# Patient Record
Sex: FEMALE | Race: WHITE | NOT HISPANIC OR LATINO | Employment: OTHER | URBAN - METROPOLITAN AREA
[De-identification: names, ages, dates, MRNs, and addresses within clinical notes are randomized per-mention and may not be internally consistent; named-entity substitution may affect disease eponyms.]

---

## 2017-11-09 ENCOUNTER — OFFICE VISIT (OUTPATIENT)
Dept: URGENT CARE | Facility: CLINIC | Age: 57
End: 2017-11-09
Payer: MEDICAID

## 2017-11-09 VITALS
SYSTOLIC BLOOD PRESSURE: 132 MMHG | WEIGHT: 168 LBS | DIASTOLIC BLOOD PRESSURE: 84 MMHG | OXYGEN SATURATION: 97 % | BODY MASS INDEX: 27 KG/M2 | TEMPERATURE: 98 F | HEART RATE: 73 BPM | HEIGHT: 66 IN

## 2017-11-09 DIAGNOSIS — M54.41 RIGHT-SIDED LOW BACK PAIN WITH RIGHT-SIDED SCIATICA, UNSPECIFIED CHRONICITY: Primary | ICD-10-CM

## 2017-11-09 PROCEDURE — 99211 OFF/OP EST MAY X REQ PHY/QHP: CPT | Mod: 25,S$GLB,, | Performed by: EMERGENCY MEDICINE

## 2017-11-09 PROCEDURE — 99203 OFFICE O/P NEW LOW 30 MIN: CPT | Mod: 25,S$GLB,, | Performed by: PHYSICIAN ASSISTANT

## 2017-11-09 RX ORDER — DEXAMETHASONE SODIUM PHOSPHATE 100 MG/10ML
8 INJECTION INTRAMUSCULAR; INTRAVENOUS
Status: COMPLETED | OUTPATIENT
Start: 2017-11-09 | End: 2017-11-09

## 2017-11-09 RX ADMIN — DEXAMETHASONE SODIUM PHOSPHATE 8 MG: 100 INJECTION INTRAMUSCULAR; INTRAVENOUS at 02:11

## 2017-11-09 NOTE — PATIENT INSTRUCTIONS
- Rest.    - Drink plenty of fluids.    - Tylenol or Ibuprofen as directed as needed for fever/pain.    - Warm compresses to the affected area for 20 minutes at a time.   - Follow up with your PCP or specialty clinic as directed in the next 1-2 weeks if not improved or as needed.  You can call [phone] to schedule an appointment with the appropriate provider.    - Go to the ED if your symptoms worsen.    Back Pain (Acute or Chronic)    Back pain is one of the most common problems. The good news is that most people feel better in 1 to 2 weeks, and most of the rest in 1 to 2 months. Most people can remain active.  People experience and describe pain differently; not everyone is the same.  · The pain can be sharp, stabbing, shooting, aching, cramping or burning.  · Movement, standing, bending, lifting, sitting, or walking may worsen pain.  · It can be localized to one spot or area, or it can be more generalized.  · It can spread or radiate upwards, to the front, or go down your arms or legs (sciatica).  · It can cause muscle spasm.  Most of the time, mechanical problems with the muscles or spine cause the pain. Mechanical problems are usually caused by an injury to the muscles or ligaments. While illness can cause back pain, it is usually not caused by a serious illness. Mechanical problems include:   · Physical activity such as sports, exercise, work, or normal activity  · Overexertion, lifting, pushing, pulling incorrectly or too aggressively  · Sudden twisting, bending, or stretching from an accident, or accidental movement  · Poor posture  · Stretching or moving wrong, without noticing pain at the time  · Poor coordination, lack of regular exercise (check with your doctor about this)  · Spinal disc disease or arthritis  · Stress  Pain can also be related to pregnancy, or illness like appendicitis, bladder or kidney infections, pelvic infections, and many other things.  Acute back pain usually gets better in 1  to 2 weeks. Back pain related to disk disease, arthritis in the spinal joints or spinal stenosis (narrowing of the spinal canal) can become chronic and last for months or years.  Unless you had a physical injury (for example, a car accident or fall) X-rays are usually not needed for the initial evaluation of back pain. If pain continues and does not respond to medical treatment, X-rays and other tests may be needed.  Home care  Try these home care recommendations:  · When in bed, try to find a position of comfort. A firm mattress is best. Try lying flat on your back with pillows under your knees. You can also try lying on your side with your knees bent up towards your chest and a pillow between your knees.  · At first, do not try to stretch out the sore spots. If there is a strain, it is not like the good soreness you get after exercising without an injury. In this case, stretching may make it worse.  · Avoid prolong sitting, long car rides, or travel. This puts more stress on the lower back than standing or walking.  · During the first 24 to 72 hours after an acute injury or flare up of chronic back pain, apply an ice pack to the painful area for 20 minutes and then remove it for 20 minutes. Do this over a period of 60 to 90 minutes or several times a day. This will reduce swelling and pain. Wrap the ice pack in a thin towel or plastic to protect your skin.  · You can start with ice, then switch to heat. Heat (hot shower, hot bath, or heating pad) reduces pain and works well for muscle spasms. Heat can be applied to the painful area for 20 minutes then remove it for 20 minutes. Do this over a period of 60 to 90 minutes or several times a day. Do not sleep on a heating pad. It can lead to skin burns or tissue damage.  · You can alternate ice and heat therapy. Talk with your doctor about the best treatment for your back pain.  · Therapeutic massage can help relax the back muscles without stretching them.  · Be aware  of safe lifting methods and do not lift anything without stretching first.  Medicines  Talk to your doctor before using medicine, especially if you have other medical problems or are taking other medicines.  · You may use over-the-counter medicine as directed on the bottle to control pain, unless another pain medicine was prescribed. If you have chronic conditions like diabetes, liver or kidney disease, stomach ulcers, or gastrointestinal bleeding, or are taking blood thinners, talk to your doctor before taking any medicine.  · Be careful if you are given a prescription medicines, narcotics, or medicine for muscle spasms. They can cause drowsiness, affect your coordination, reflexes, and judgement. Do not drive or operate heavy machinery.  Follow-up care  Follow up with your healthcare provider, or as advised.   A radiologist will review any X-rays that were taken. Your provide will notify you of any new findings that may affect your care.  Call 911  Call emergency services if any of the following occur:  · Trouble breathing  · Confusion  · Very drowsy or trouble awakening  · Fainting or loss of consciousness  · Rapid or very slow heart rate  · Loss of bowel or bladder control  When to seek medical advice  Call your healthcare provider right away if any of these occur:   · Pain becomes worse or spreads to your legs  · Weakness or numbness in one or both legs  · Numbness in the groin or genital area  Date Last Reviewed: 7/1/2016  © 5765-9912 [employer]. [address] [place] [id number]. All rights reserved. This information is not intended as a substitute for professional medical care. Always follow your healthcare professional's instructions.        Sciatica    Sciatica is a condition that causes pain in the lower back that spreads down into the buttock, hip, and leg. Sometimes the leg pain can happen without any back pain. Sciatica happens when a spinal nerve is irritated or has pressure put  on it as comes out of the spinal canal in the lower back. This most often happens when a bulge or rupture of a nearby spinal disk presses on the nerve. Sciatica can also be caused by a narrowing of the spinal canal (spinal stenosis) or spasm of the muscle in the buttocks that the sciatic nerve passes through (pyriform muscle). Sciatica is also called lumbar radiculopathy.  Sciatica may begin after a sudden twisting or bending force, such as in a car accident. Or it can happen after a simple awkward movement. In either case, muscle spasm often also happens. Muscle spasm makes the pain worse.  A healthcare provider makes a diagnosis of sciatica from your symptoms and a physical exam. Unless you had an injury from a car accident or fall, you usually wont have X-rays taken at this time. This is because the nerves and disks in your back cant be seen on an X-ray. If the provider sees signs of a compressed nerve, you will need to schedule an MRI scan as an outpatient. Signs of a compressed nerve include loss of strength in a leg.  Most sciatica gets better with medicine, exercise, and physical therapy. If your symptoms continue after at least 3 months of medical treatment, you may need surgery or injections to your lower back.  Home care  Follow these tips when caring for yourself at home:  · You may need to stay in bed the first few days. But as soon as possible, begin sitting up or walking. This will help you avoid problems that come from staying in bed for long periods.  · When in bed, try to find a position that is comfortable. A firm mattress is best. Try lying flat on your back with pillows under your knees. You can also try lying on your side with your knees bent up toward your chest and a pillow between your knees.  · Avoid sitting for long periods. This puts more stress on your lower back than standing or walking.  · Use heat from a hot shower, hot bath, or heating pad to help ease pain. Massage can also help.  You can also try using an ice pack. You can make your own ice pack by putting ice cubes in a plastic bag. Wrap the bag in a thin towel. Try both heat and cold to see which works best. Use the method that feels best for 20 minutes several times a day.  · You may use acetaminophen or ibuprofen to ease pain, unless another pain medicine was prescribed. Note: If you have chronic liver or kidney disease, talk with your healthcare provider before taking these medicines. Also talk with your provider if youve had a stomach ulcer or gastrointestinal bleeding.  · Use safe lifting methods. Dont lift anything heavier than 15 pounds until all of the pain is gone.  Follow-up care  Follow up with your healthcare provider, or as advised. You may need physical therapy or additional tests.  If X-rays were taken, a radiologist will look at them. You will be told of any new findings that may affect your care.  When to seek medical advice  Call your healthcare provider right away if any of these occur:  · Pain gets worse even after taking prescribed medicine  · Weakness or numbness in 1 or both legs or hips  · Numbness in your groin or genital area  · You cant control your bowel or bladder  · Fever  · Redness or swelling over your back or spine   Date Last Reviewed: 8/1/2016  © 3924-2377 [employer]. [address] [place] [address], [place] [address]. All rights reserved. This information is not intended as a substitute for professional medical care. Always follow your healthcare professional's instructions.

## 2017-11-09 NOTE — PROGRESS NOTES
"Subjective:       Patient ID: Taty Kenney is a 57 y.o. female.    Vitals:  height is 5' 6" (1.676 m) and weight is 76.2 kg (168 lb). Her tympanic temperature is 97.8 °F (36.6 °C). Her blood pressure is 132/84 and her pulse is 73. Her oxygen saturation is 97%.     Chief Complaint: Back Pain    This is a 57 y.o. female with History reviewed. No pertinent past medical history.   who presents today with a chief complaint of Back Pain.      Back Pain   This is a new problem. The current episode started in the past 7 days. The problem occurs constantly. The problem has been gradually worsening since onset. The quality of the pain is described as shooting. The pain radiates to the left foot. The pain is at a severity of 9/10. The pain is severe. The pain is the same all the time. The symptoms are aggravated by bending, sitting and twisting. Stiffness is present all day. Pertinent negatives include no abdominal pain, bladder incontinence, bowel incontinence, dysuria or numbness. She has tried muscle relaxant for the symptoms. The treatment provided moderate relief.     Review of Systems   Constitution: Negative for malaise/fatigue.   Skin: Negative for rash.   Musculoskeletal: Positive for back pain. Negative for muscle cramps, muscle weakness and stiffness.   Gastrointestinal: Negative for abdominal pain and bowel incontinence.   Genitourinary: Negative for bladder incontinence, dysuria, hematuria and urgency.   Neurological: Negative for disturbances in coordination and numbness.       Objective:      Physical Exam   Constitutional: She is oriented to person, place, and time. She appears well-developed and well-nourished.   HENT:   Head: Normocephalic and atraumatic.   Eyes: Conjunctivae are normal.   Neck: Normal range of motion. Neck supple. No thyromegaly present.   Cardiovascular: Normal rate and regular rhythm.  Exam reveals no gallop and no friction rub.    No murmur heard.  Pulmonary/Chest: Effort normal " and breath sounds normal. She has no wheezes. She has no rales.   Musculoskeletal:        Lumbar back: She exhibits decreased range of motion and tenderness (right SI joint). She exhibits no bony tenderness.   Lymphadenopathy:     She has no cervical adenopathy.   Neurological: She is alert and oriented to person, place, and time.   Skin: Skin is warm and dry. No rash noted. No erythema.   Psychiatric: She has a normal mood and affect. Her behavior is normal. Judgment and thought content normal.   Nursing note and vitals reviewed.      Assessment:       1. Right-sided low back pain with right-sided sciatica, unspecified chronicity        Plan:         Right-sided low back pain with right-sided sciatica, unspecified chronicity  -     dexamethasone injection 8 mg; Inject 0.8 mLs (8 mg total) into the muscle one time.      Taty was seen today for back pain.    Diagnoses and all orders for this visit:    Right-sided low back pain with right-sided sciatica, unspecified chronicity  -     dexamethasone injection 8 mg; Inject 0.8 mLs (8 mg total) into the muscle one time.      Patient Instructions   - Rest.    - Drink plenty of fluids.    - Tylenol or Ibuprofen as directed as needed for fever/pain.    - Warm compresses to the affected area for 20 minutes at a time.   - Follow up with your PCP or specialty clinic as directed in the next 1-2 weeks if not improved or as needed.  You can call [phone] to schedule an appointment with the appropriate provider.    - Go to the ED if your symptoms worsen.    Back Pain (Acute or Chronic)    Back pain is one of the most common problems. The good news is that most people feel better in 1 to 2 weeks, and most of the rest in 1 to 2 months. Most people can remain active.  People experience and describe pain differently; not everyone is the same.  · The pain can be sharp, stabbing, shooting, aching, cramping or burning.  · Movement, standing, bending, lifting, sitting, or walking  may worsen pain.  · It can be localized to one spot or area, or it can be more generalized.  · It can spread or radiate upwards, to the front, or go down your arms or legs (sciatica).  · It can cause muscle spasm.  Most of the time, mechanical problems with the muscles or spine cause the pain. Mechanical problems are usually caused by an injury to the muscles or ligaments. While illness can cause back pain, it is usually not caused by a serious illness. Mechanical problems include:   · Physical activity such as sports, exercise, work, or normal activity  · Overexertion, lifting, pushing, pulling incorrectly or too aggressively  · Sudden twisting, bending, or stretching from an accident, or accidental movement  · Poor posture  · Stretching or moving wrong, without noticing pain at the time  · Poor coordination, lack of regular exercise (check with your doctor about this)  · Spinal disc disease or arthritis  · Stress  Pain can also be related to pregnancy, or illness like appendicitis, bladder or kidney infections, pelvic infections, and many other things.  Acute back pain usually gets better in 1 to 2 weeks. Back pain related to disk disease, arthritis in the spinal joints or spinal stenosis (narrowing of the spinal canal) can become chronic and last for months or years.  Unless you had a physical injury (for example, a car accident or fall) X-rays are usually not needed for the initial evaluation of back pain. If pain continues and does not respond to medical treatment, X-rays and other tests may be needed.  Home care  Try these home care recommendations:  · When in bed, try to find a position of comfort. A firm mattress is best. Try lying flat on your back with pillows under your knees. You can also try lying on your side with your knees bent up towards your chest and a pillow between your knees.  · At first, do not try to stretch out the sore spots. If there is a strain, it is not like the good soreness you get  after exercising without an injury. In this case, stretching may make it worse.  · Avoid prolong sitting, long car rides, or travel. This puts more stress on the lower back than standing or walking.  · During the first 24 to 72 hours after an acute injury or flare up of chronic back pain, apply an ice pack to the painful area for 20 minutes and then remove it for 20 minutes. Do this over a period of 60 to 90 minutes or several times a day. This will reduce swelling and pain. Wrap the ice pack in a thin towel or plastic to protect your skin.  · You can start with ice, then switch to heat. Heat (hot shower, hot bath, or heating pad) reduces pain and works well for muscle spasms. Heat can be applied to the painful area for 20 minutes then remove it for 20 minutes. Do this over a period of 60 to 90 minutes or several times a day. Do not sleep on a heating pad. It can lead to skin burns or tissue damage.  · You can alternate ice and heat therapy. Talk with your doctor about the best treatment for your back pain.  · Therapeutic massage can help relax the back muscles without stretching them.  · Be aware of safe lifting methods and do not lift anything without stretching first.  Medicines  Talk to your doctor before using medicine, especially if you have other medical problems or are taking other medicines.  · You may use over-the-counter medicine as directed on the bottle to control pain, unless another pain medicine was prescribed. If you have chronic conditions like diabetes, liver or kidney disease, stomach ulcers, or gastrointestinal bleeding, or are taking blood thinners, talk to your doctor before taking any medicine.  · Be careful if you are given a prescription medicines, narcotics, or medicine for muscle spasms. They can cause drowsiness, affect your coordination, reflexes, and judgement. Do not drive or operate heavy machinery.  Follow-up care  Follow up with your healthcare provider, or as advised.   A  radiologist will review any X-rays that were taken. Your provide will notify you of any new findings that may affect your care.  Call 911  Call emergency services if any of the following occur:  · Trouble breathing  · Confusion  · Very drowsy or trouble awakening  · Fainting or loss of consciousness  · Rapid or very slow heart rate  · Loss of bowel or bladder control  When to seek medical advice  Call your healthcare provider right away if any of these occur:   · Pain becomes worse or spreads to your legs  · Weakness or numbness in one or both legs  · Numbness in the groin or genital area  Date Last Reviewed: 7/1/2016 © 2000-2017 [employer]. [address] [place] [address], [place] [address]. All rights reserved. This information is not intended as a substitute for professional medical care. Always follow your healthcare professional's instructions.        Sciatica    Sciatica is a condition that causes pain in the lower back that spreads down into the buttock, hip, and leg. Sometimes the leg pain can happen without any back pain. Sciatica happens when a spinal nerve is irritated or has pressure put on it as comes out of the spinal canal in the lower back. This most often happens when a bulge or rupture of a nearby spinal disk presses on the nerve. Sciatica can also be caused by a narrowing of the spinal canal (spinal stenosis) or spasm of the muscle in the buttocks that the sciatic nerve passes through (pyriform muscle). Sciatica is also called lumbar radiculopathy.  Sciatica may begin after a sudden twisting or bending force, such as in a car accident. Or it can happen after a simple awkward movement. In either case, muscle spasm often also happens. Muscle spasm makes the pain worse.  A healthcare provider makes a diagnosis of sciatica from your symptoms and a physical exam. Unless you had an injury from a car accident or fall, you usually wont have X-rays taken at this time. This is because the nerves and  disks in your back cant be seen on an X-ray. If the provider sees signs of a compressed nerve, you will need to schedule an MRI scan as an outpatient. Signs of a compressed nerve include loss of strength in a leg.  Most sciatica gets better with medicine, exercise, and physical therapy. If your symptoms continue after at least 3 months of medical treatment, you may need surgery or injections to your lower back.  Home care  Follow these tips when caring for yourself at home:  · You may need to stay in bed the first few days. But as soon as possible, begin sitting up or walking. This will help you avoid problems that come from staying in bed for long periods.  · When in bed, try to find a position that is comfortable. A firm mattress is best. Try lying flat on your back with pillows under your knees. You can also try lying on your side with your knees bent up toward your chest and a pillow between your knees.  · Avoid sitting for long periods. This puts more stress on your lower back than standing or walking.  · Use heat from a hot shower, hot bath, or heating pad to help ease pain. Massage can also help. You can also try using an ice pack. You can make your own ice pack by putting ice cubes in a plastic bag. Wrap the bag in a thin towel. Try both heat and cold to see which works best. Use the method that feels best for 20 minutes several times a day.  · You may use acetaminophen or ibuprofen to ease pain, unless another pain medicine was prescribed. Note: If you have chronic liver or kidney disease, talk with your healthcare provider before taking these medicines. Also talk with your provider if youve had a stomach ulcer or gastrointestinal bleeding.  · Use safe lifting methods. Dont lift anything heavier than 15 pounds until all of the pain is gone.  Follow-up care  Follow up with your healthcare provider, or as advised. You may need physical therapy or additional tests.  If X-rays were taken, a radiologist will  look at them. You will be told of any new findings that may affect your care.  When to seek medical advice  Call your healthcare provider right away if any of these occur:  · Pain gets worse even after taking prescribed medicine  · Weakness or numbness in 1 or both legs or hips  · Numbness in your groin or genital area  · You cant control your bowel or bladder  · Fever  · Redness or swelling over your back or spine   Date Last Reviewed: 8/1/2016 © 2000-2017 [employer]. [address] [place] [address], [place] [address]. All rights reserved. This information is not intended as a substitute for professional medical care. Always follow your healthcare professional's instructions.

## 2018-01-05 ENCOUNTER — OFFICE VISIT (OUTPATIENT)
Dept: URGENT CARE | Facility: CLINIC | Age: 58
End: 2018-01-05
Payer: MEDICAID

## 2018-01-05 VITALS
OXYGEN SATURATION: 97 % | RESPIRATION RATE: 18 BRPM | SYSTOLIC BLOOD PRESSURE: 164 MMHG | TEMPERATURE: 98 F | DIASTOLIC BLOOD PRESSURE: 102 MMHG | BODY MASS INDEX: 26.68 KG/M2 | HEART RATE: 87 BPM | WEIGHT: 170 LBS | HEIGHT: 67 IN

## 2018-01-05 DIAGNOSIS — B02.9 HERPES ZOSTER WITHOUT COMPLICATION: Primary | ICD-10-CM

## 2018-01-05 PROCEDURE — 99214 OFFICE O/P EST MOD 30 MIN: CPT | Mod: S$GLB,,, | Performed by: NURSE PRACTITIONER

## 2018-01-05 RX ORDER — PAROXETINE HYDROCHLORIDE 40 MG/1
40 TABLET, FILM COATED ORAL EVERY MORNING
COMMUNITY
End: 2020-07-06

## 2018-01-05 RX ORDER — VALACYCLOVIR HYDROCHLORIDE 1 G/1
1000 TABLET, FILM COATED ORAL 3 TIMES DAILY
Qty: 21 TABLET | Refills: 0 | Status: SHIPPED | OUTPATIENT
Start: 2018-01-05 | End: 2020-07-06 | Stop reason: ALTCHOICE

## 2018-01-05 NOTE — PROGRESS NOTES
"Subjective:       Patient ID: Taty Kenney is a 57 y.o. female.    Vitals:  height is 5' 7" (1.702 m) and weight is 77.1 kg (170 lb). Her oral temperature is 98.2 °F (36.8 °C). Her blood pressure is 164/102 (abnormal) and her pulse is 87. Her respiration is 18 and oxygen saturation is 97%.     Chief Complaint: Rash    4 days of developing rash to both eye orbits and eyelids. She endorses moderate pain, but no pruritus. She denies occular pain or discharge or visual changes. She has tried antihistamine with no success.  She denies recent trauma, change in soap or detergent.      Rash   This is a new problem. The current episode started in the past 7 days. The problem has been gradually worsening since onset. The affected locations include the face. The rash is characterized by itchiness and redness. Pertinent negatives include no fever, joint pain, shortness of breath or sore throat. Past treatments include antihistamine. The treatment provided no relief.     Review of Systems   Constitution: Negative for chills and fever.   HENT: Negative for sore throat.    Respiratory: Negative for shortness of breath.    Skin: Positive for rash. Negative for itching.   Musculoskeletal: Negative for joint pain.       Objective:      Physical Exam   Constitutional: She is oriented to person, place, and time. She appears well-developed and well-nourished.   HENT:   Head: Normocephalic and atraumatic.       Right Ear: External ear normal.   Left Ear: External ear normal.   Nose: Nose normal.   Eyes: EOM and lids are normal. Pupils are equal, round, and reactive to light. Right conjunctiva is injected. Left conjunctiva is injected.   Chronic dry eyes and injection   Neck: Trachea normal, full passive range of motion without pain and phonation normal. Neck supple.   Musculoskeletal: Normal range of motion.   Neurological: She is alert and oriented to person, place, and time.   Skin: Skin is warm, dry and intact.   Psychiatric: " She has a normal mood and affect. Her speech is normal and behavior is normal. Judgment and thought content normal. Cognition and memory are normal.   Nursing note and vitals reviewed.      Assessment:       1. Herpes zoster without complication        Plan:         Herpes zoster without complication  -     Ambulatory referral to Ophthalmology  -     valACYclovir (VALTREX) 1000 MG tablet; Take 1 tablet (1,000 mg total) by mouth 3 (three) times daily.  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  She prefers to see him for this issue, but an urgent [facility] order was placed in case there is an issue with being seen today.    Patient states appt made for this afternoon with Dr Wright.    It was stressed to patient the importance of being seen for this issue urgently and she acknowledged complete understanding of repercussions of delaying treatment.    Patient Instructions         Shingles (Herpes Zoster)     Talk to your healthcare provider about the shingles vaccine.     Shingles is also called herpes zoster. It is a painful skin rash caused by the herpes zoster virus. This is the same virus that causes chickenpox. After a person has chickenpox, the virus remains inactive in the nerve cells. Years later, the virus can become active again and travel to the skin. Most people have shingles only once, but it is possible to have it more than once.  What are the risk factors for shingles?  Anyone who has ever had chickenpox can develop shingles. But your risk is greater if you:  · Are 50 years of age or older  · Have an illness that weakens your immune system, such as HIV/AIDS  · Have cancer, especially Hodgkin disease or lymphoma  · Take medicines that weaken your immune system  What are the symptoms of shingles?  · The first sign of shingles is usually pain, burning, tingling, or itching on one part of your face or body. You may also feel as if you have the flu, with  fever and chills.  · A red rash with small blisters appears within a few days. The rash may appear as follows:   ¨ The blisters can occur anywhere, but theyre most common on the back, chest, or abdomen.  ¨ They usually appear on only one side of the body, spreading along the nerve pathway where the virus was inactive.   ¨ The rash can also form around an eye, along one side of the face or neck, or in the mouth.  ¨ In a few people, usually those with weakened immune systems, shingles appear on more than one part of the body at once.  · After a few days, the blisters become dry and form a crust. The crust falls off in days to weeks. The blisters generally do not leave scars.  How is shingles treated?  For most people, shingles heals on its own in a few weeks. But treatment is recommended to help relieve pain, speed healing, and reduce the risk of complications. Antiviral medicines are prescribed within the first 72 hours of the appearance of the rash. To lessen symptoms:  · Apply ice packs (wrapped in a thin towel) or cool compresses, or soak in a cool bath.  · Use calamine lotion to calm itchy skin.  · Ask your healthcare provider about over-the-counter pain relievers. If your pain is severe, your healthcare provider may prescribe stronger pain medicines.  What are the complications of shingles?  Shingles often goes away with no lasting effects. But some people have serious problems long after the blisters have healed:  · Postherpetic neuralgia. This is the most common complication. It is severe nerve pain at the place where the rash used to be. It can last for months, or even years after you have had shingles. Medicines can be prescribed to help relieve the pain and improve quality of life.  · Bacterial infection. Shingles blisters may become infected with bacteria. Antibiotic medicine is used to treat the infection.  · Eye problems. A person with shingles on the face should see his or her healthcare provider right  away. Shingles can cause serious problems with vision, and even blindness.  Very rarely shingles can also lead to pneumonia, hearing problems, brain inflammation, or even death.   When to seek medical care  Contact your healthcare provider if you experience any of the following:  · Symptoms that dont go away with treatment  · A rash or blisters near your eye  · Increased drainage, fever, or rash after treatment, or severe pain that doesnt go away   How can shingles be prevented?  You can only get shingles if you have had chicken pox in the past. Those who have never had chickenpox can get the virus from you. Although instead of developing shingles, the person may get chickenpox. Until your blisters form scabs, avoid contact with others, especially the following:  · Pregnant women who have never had chickenpox or the vaccine  · Infants who were born early (prematurely) or who had low weight at birth  · People with weak immune system (for example, people receiving chemotherapy for cancer, people who have had organ transplants, or people with HIV infections)     The shingles vaccine  If youre 60 years of age or older, ask your healthcare provider if you should receive the shingles vaccine. The vaccine makes it less likely that you will develop shingles. If you do develop shingles, your symptoms will likely be milder than if you hadnt been vaccinated. Note: Although the vaccine is licensed for people 50 years of age or older, the CDC does not recommend the vaccine for those who are 50 to 59 years old.   Date Last Reviewed: 10/1/2016  © 3848-1024 [employer]. [address] [place] [address], [place] [address]. All rights reserved. This information is not intended as a substitute for professional medical care. Always follow your healthcare professional's instructions.

## 2018-01-05 NOTE — PATIENT INSTRUCTIONS

## 2018-01-24 ENCOUNTER — TELEPHONE (OUTPATIENT)
Dept: OPTOMETRY | Facility: CLINIC | Age: 58
End: 2018-01-24

## 2018-07-17 ENCOUNTER — TELEPHONE (OUTPATIENT)
Dept: SURGERY | Facility: CLINIC | Age: 58
End: 2018-07-17

## 2018-07-17 NOTE — TELEPHONE ENCOUNTER
Called patient to discuss referral we received from Dr. Barrios's office. Left voicemail with my callback number requesting return call at earliest convenience

## 2018-07-18 ENCOUNTER — TELEPHONE (OUTPATIENT)
Dept: SURGERY | Facility: CLINIC | Age: 58
End: 2018-07-18

## 2018-07-18 NOTE — TELEPHONE ENCOUNTER
Patient returned my call, we discussed her recent abnormal mammogram and need for biopsy at [facility]. Patient and I went over our abnormal mammogram protocol, and that we will need to obtain her images on disc prior to setting her up for biopsy. Patient is unable to electronically sign a SERINA, nor can she come by this week to sign it. She would prefer I mail her the SERINA with return postage so she can sign it and mail it back to me. Confirmed address in chart is correct, will overnight release to her with return postage. Patient verbalized understanding to all information

## 2018-07-27 ENCOUNTER — TELEPHONE (OUTPATIENT)
Dept: SURGERY | Facility: CLINIC | Age: 58
End: 2018-07-27

## 2018-07-27 NOTE — TELEPHONE ENCOUNTER
Called patient to let her know I received her SERINA in the mail today, and will request her images from [facility]. No answer, unable to leave voicemail

## 2018-07-31 ENCOUNTER — TELEPHONE (OUTPATIENT)
Dept: SURGERY | Facility: CLINIC | Age: 58
End: 2018-07-31

## 2018-07-31 ENCOUNTER — HOSPITAL ENCOUNTER (OUTPATIENT)
Dept: RADIOLOGY | Facility: HOSPITAL | Age: 58
Discharge: HOME | End: 2018-07-31
Attending: OBSTETRICS & GYNECOLOGY

## 2018-07-31 DIAGNOSIS — R92.8 ABNORMAL MAMMOGRAM: ICD-10-CM

## 2018-07-31 NOTE — TELEPHONE ENCOUNTER
Called patient to let her know I got her images from [facility] today. Patient and I reviewed that images will be interpreted and then she will receive a call regarding the recommendation. Verbalized understanding

## 2018-08-31 ENCOUNTER — HOSPITAL ENCOUNTER (EMERGENCY)
Facility: HOSPITAL | Age: 58
Discharge: HOME | End: 2018-09-01
Attending: EMERGENCY MEDICINE
Payer: MEDICAID

## 2018-08-31 VITALS
HEIGHT: 67 IN | DIASTOLIC BLOOD PRESSURE: 79 MMHG | TEMPERATURE: 98 F | RESPIRATION RATE: 18 BRPM | OXYGEN SATURATION: 99 % | SYSTOLIC BLOOD PRESSURE: 114 MMHG | BODY MASS INDEX: 26.68 KG/M2 | WEIGHT: 170 LBS | HEART RATE: 77 BPM

## 2018-08-31 DIAGNOSIS — R06.02 SOB (SHORTNESS OF BREATH): ICD-10-CM

## 2018-08-31 DIAGNOSIS — F10.920 ALCOHOLIC INTOXICATION WITHOUT COMPLICATION: Primary | ICD-10-CM

## 2018-08-31 LAB
ALBUMIN SERPL BCP-MCNC: 3.9 G/DL
ALP SERPL-CCNC: 100 U/L
ALT SERPL W/O P-5'-P-CCNC: 57 U/L
ANION GAP SERPL CALC-SCNC: 13 MMOL/L
APAP SERPL-MCNC: <3 UG/ML
AST SERPL-CCNC: 52 U/L
BASOPHILS # BLD AUTO: 0.03 K/UL
BASOPHILS NFR BLD: 0.8 %
BILIRUB SERPL-MCNC: 0.3 MG/DL
BUN SERPL-MCNC: 32 MG/DL
CALCIUM SERPL-MCNC: 9 MG/DL
CHLORIDE SERPL-SCNC: 102 MMOL/L
CO2 SERPL-SCNC: 27 MMOL/L
CREAT SERPL-MCNC: 1 MG/DL
DIFFERENTIAL METHOD: ABNORMAL
EOSINOPHIL # BLD AUTO: 0 K/UL
EOSINOPHIL NFR BLD: 0.8 %
ERYTHROCYTE [DISTWIDTH] IN BLOOD BY AUTOMATED COUNT: 15 %
EST. GFR  (AFRICAN AMERICAN): >60 ML/MIN/1.73 M^2
EST. GFR  (NON AFRICAN AMERICAN): >60 ML/MIN/1.73 M^2
ETHANOL SERPL-MCNC: 224 MG/DL
GLUCOSE SERPL-MCNC: 109 MG/DL
HCT VFR BLD AUTO: 33.2 %
HGB BLD-MCNC: 10.7 G/DL
IMM GRANULOCYTES # BLD AUTO: 0.01 K/UL
IMM GRANULOCYTES NFR BLD AUTO: 0.3 %
LYMPHOCYTES # BLD AUTO: 2.1 K/UL
LYMPHOCYTES NFR BLD: 58.6 %
MCH RBC QN AUTO: 31.8 PG
MCHC RBC AUTO-ENTMCNC: 32.2 G/DL
MCV RBC AUTO: 99 FL
MONOCYTES # BLD AUTO: 0.3 K/UL
MONOCYTES NFR BLD: 8.2 %
NEUTROPHILS # BLD AUTO: 1.1 K/UL
NEUTROPHILS NFR BLD: 31.3 %
NRBC BLD-RTO: 0 /100 WBC
PLATELET # BLD AUTO: 149 K/UL
PMV BLD AUTO: 12.6 FL
POTASSIUM SERPL-SCNC: 4.3 MMOL/L
PROT SERPL-MCNC: 7.7 G/DL
RBC # BLD AUTO: 3.36 M/UL
SALICYLATES SERPL-MCNC: <5 MG/DL
SODIUM SERPL-SCNC: 142 MMOL/L
TROPONIN I SERPL DL<=0.01 NG/ML-MCNC: 0.01 NG/ML
TSH SERPL DL<=0.005 MIU/L-ACNC: 0.62 UIU/ML
WBC # BLD AUTO: 3.55 K/UL

## 2018-08-31 PROCEDURE — 84484 ASSAY OF TROPONIN QUANT: CPT

## 2018-08-31 PROCEDURE — 80329 ANALGESICS NON-OPIOID 1 OR 2: CPT

## 2018-08-31 PROCEDURE — 99284 EMERGENCY DEPT VISIT MOD MDM: CPT | Mod: ,,, | Performed by: EMERGENCY MEDICINE

## 2018-08-31 PROCEDURE — 80307 DRUG TEST PRSMV CHEM ANLYZR: CPT

## 2018-08-31 PROCEDURE — 93005 ELECTROCARDIOGRAM TRACING: CPT

## 2018-08-31 PROCEDURE — 80053 COMPREHEN METABOLIC PANEL: CPT

## 2018-08-31 PROCEDURE — 99284 EMERGENCY DEPT VISIT MOD MDM: CPT

## 2018-08-31 PROCEDURE — 85025 COMPLETE CBC W/AUTO DIFF WBC: CPT

## 2018-08-31 PROCEDURE — 80320 DRUG SCREEN QUANTALCOHOLS: CPT

## 2018-08-31 PROCEDURE — 93010 ELECTROCARDIOGRAM REPORT: CPT | Mod: ,,, | Performed by: INTERNAL MEDICINE

## 2018-08-31 PROCEDURE — 84443 ASSAY THYROID STIM HORMONE: CPT

## 2018-09-01 NOTE — ED TRIAGE NOTES
"Pt presents to ED c/o "not feeling well." Pt states that she has hx of anxiety and felt that she was having a panic attack. Pt states that she consumed an unknown amount of vodka tonight. Pt's speech is slurred but AAOx4.     LOC: Patient name and date of birth verified.  The patient is awake, alert and aware of environment with an appropriate affect, the patient is oriented x 3 and speaking appropriately.  Pt in NAD.    APPEARANCE: Patient resting comfortably and in no acute distress, patient is clean and well groomed, patient's clothing is properly fastened.  SKIN: The skin is warm and dry, color consistent with ethnicity, patient has normal skin turgor and moist mucus membranes, skin intact, no breakdown or brusing noted.  MUSCULOSKELETAL: Patient moving all extremities well, no obvious swelling or deformities noted.  RESPIRATORY: Airway is open and patent, respirations are spontaneous, patient has a normal effort and rate, no accessory muscle use noted.  CARDIAC: Patient has a normal rate and rhythm, no periphreal edema noted, capillary refill < 3 seconds.  ABDOMEN: Soft and non tender to palpation, no distention noted. Bowel sounds present in all four quadrants.  NEUROLOGIC: Eyes open spontaneously, behavior appropriate to situation, follows commands, facial expression symmetrical, bilateral hand grasp equal and even, purposeful motor response noted, normal sensation in all extremities when touched with a finger.    "

## 2018-09-01 NOTE — ED PROVIDER NOTES
Encounter Date: 8/31/2018    SCRIBE #1 NOTE: I, Anna Choi, am scribing for, and in the presence of,  Dr. Ashby. I have scribed the following portions of the note - the Resident attestation.       History     Chief Complaint   Patient presents with    Alcohol Intoxication     Pt presented to the ED via  EMS. Pt intoxicated and has taken a lot of sedatives.     57 yo F sent by ambulance due to alcohol intoxication.  Pt is AAOX2, following commands. She was complaining of some sob, despite satting 98% on RA. Denies any chest pain or pressure or headache or weakness or abdominal pain.   History obtained from pt, limited due to her mental status          Review of patient's allergies indicates:   Allergen Reactions    Augmentin [amoxicillin-pot clavulanate]      Past Medical History:   Diagnosis Date    Anxiety     Depression     Hypertension      Past Surgical History:   Procedure Laterality Date    HYSTERECTOMY       Family History   Problem Relation Age of Onset    Hypertension Mother     Hyperlipidemia Mother     Alcohol abuse Mother     Cancer Father      Social History     Tobacco Use    Smoking status: Former Smoker   Substance Use Topics    Alcohol use: No    Drug use: No     Review of Systems   Unable to perform ROS: Mental status change       Physical Exam     Initial Vitals [08/31/18 1656]   BP Pulse Resp Temp SpO2   125/79 89 17 98.2 °F (36.8 °C) 98 %      MAP       --         Physical Exam     General - NAD, appears intoxicated.   HEENT - PERRLA, EOM intact, no scleral jaundice, clear oropharynx, No noticeable or palpable swelling, redness or rash around throat or on face, No lymphadenopathy  Cardiovascular - RRR no m/r/g, no JVD, no carotid bruits  Lungs - Clear to auscltation, no use of acessory muscles, crackles,  wheezes.  Skin - No rashes, skin warm and dry, no erythematous areas  Abdomen - Normal bowel sounds, abdomen soft and nontender  Genito Urinary - Genital exam not performed  since complaints not related.  Rectal - Rectal exam not performed since no symptoms indicated blood loss.  Extremeties - No edema, cyanosis or clubbing  Musculo Skeletal - 5/5 strength, normal range of motion, no swollen or erythematous  joints.  Neurological - Alert and oriented x 2, following commands appropriately, CN 2-12 grossly intact      ED Course   Procedures  Labs Reviewed   COMPREHENSIVE METABOLIC PANEL - Abnormal; Notable for the following components:       Result Value    BUN, Bld 32 (*)     AST 52 (*)     ALT 57 (*)     All other components within normal limits   ACETAMINOPHEN LEVEL - Abnormal; Notable for the following components:    Acetaminophen (Tylenol), Serum <3.0 (*)     All other components within normal limits   ALCOHOL,MEDICAL (ETHANOL) - Abnormal; Notable for the following components:    Alcohol, Medical, Serum 224 (*)     All other components within normal limits   SALICYLATE LEVEL - Abnormal; Notable for the following components:    Salicylate Lvl <5.0 (*)     All other components within normal limits   CBC W/ AUTO DIFFERENTIAL - Abnormal; Notable for the following components:    WBC 3.55 (*)     RBC 3.36 (*)     Hemoglobin 10.7 (*)     Hematocrit 33.2 (*)     MCV 99 (*)     MCH 31.8 (*)     RDW 15.0 (*)     Platelets 149 (*)     Gran # (ANC) 1.1 (*)     Gran% 31.3 (*)     Lymph% 58.6 (*)     All other components within normal limits    Narrative:     ADD-ON CMP #[id number] PER ELEANOR ASHBY MD 20:40  08/31/2018   Add on [facility] order# [id number] per Dr. Eleanor Ashby @  08/31/2018    20:56    TSH   TROPONIN I   CBC W/ AUTO DIFFERENTIAL   COMPREHENSIVE METABOLIC PANEL          Imaging Results    None          Medical Decision Making:   Initial Assessment:   Intoxication  Differential Diagnosis:   Substance use  Medication intoxication  Clinical Tests:   Lab Tests: Ordered  Radiological Study: Ordered  Medical Tests: Ordered  ED Management:  At 23:40pm, when pt is more sober, walking wo  difficulty. No dysarthria or nystagmus.   - Talked to her daughter on the phone, who stated concern since pt is mixing her anxiety medication with alcohol  - talked to her, and she denies suicidal ideation.              Scribe Attestation:   Scribe #1: I performed the above scribed service and the documentation accurately describes the services I performed. I attest to the accuracy of the note.    Attending Attestation:   Physician Attestation Statement for Resident:  As the supervising MD   Physician Attestation Statement: I have personally seen and examined this patient.   I agree with the above history. -: Patient is intoxicated today. She denies SI. She's upset about death of her father. She has delirium likely secondary to alcohol. We will observe for sobriety.   As the supervising MD I agree with the above PE.    As the supervising MD I agree with the above treatment, course, plan, and disposition.             After the patient was observed, she was noted to be alert and oriented. She was ambulating without difficulty.  She denies suicidal ideation.  There is no indication for physician's emergency certificate at this time.           Clinical Impression:   The primary encounter diagnosis was Alcoholic intoxication without complication. A diagnosis of SOB (shortness of breath) was also pertinent to this visit.        I, Dr. Edu Ashby, personally performed the services described in this documentation. All medical record entries made by the scribe were at my direction and in my presence.  I have reviewed the chart and agree that the record reflects my personal performance and is accurate and complete. Edu Ashby MD.  5:08 PM 09/01/2018                        Denver Ashby MD  09/01/18 7412

## 2018-09-01 NOTE — ED NOTES
Bed: AHA1  Expected date: 8/31/18  Expected time: 6:10 PM  Means of arrival:   Comments:  Anne Marie

## 2019-01-23 ENCOUNTER — TELEPHONE (OUTPATIENT)
Dept: ORTHOPEDICS | Facility: CLINIC | Age: 59
End: 2019-01-23

## 2019-01-23 NOTE — TELEPHONE ENCOUNTER
----- Message from Chris Willoughby sent at 1/23/2019 10:34 AM CST -----  Please schedule with Dr. Phillips, Osteoarthritis of Wrist, Referral scanned in EPIC

## 2019-01-23 NOTE — TELEPHONE ENCOUNTER
Left voicemail advising patient to call us back at her earliest convenience to get her scheduled with Dr. Phillips in [place] for her wrist.

## 2019-01-24 ENCOUNTER — TELEPHONE (OUTPATIENT)
Dept: ORTHOPEDICS | Facility: CLINIC | Age: 59
End: 2019-01-24

## 2019-01-24 NOTE — TELEPHONE ENCOUNTER
----- Message from Jenn Griffith sent at 1/24/2019  9:06 AM CST -----  Contact: LUBA JAVIER [[id number]]  Name of Who is Calling: LUBA JAVIER [[id number]]      What is the request in detail: Returning a call to schedule appt in [place]. Please call      Can the clinic reply by [facility]: no    What Number to Call Back if not in [place]: [phone]

## 2019-01-24 NOTE — TELEPHONE ENCOUNTER
Called patient to schedule appointment. Patient did not answer. Left voicemail for pt to call back.

## 2019-02-11 DIAGNOSIS — M25.531 BILATERAL WRIST PAIN: Primary | ICD-10-CM

## 2019-02-11 DIAGNOSIS — M25.532 BILATERAL WRIST PAIN: Primary | ICD-10-CM

## 2020-06-29 ENCOUNTER — HOSPITAL ENCOUNTER (OUTPATIENT)
Dept: RADIOLOGY | Facility: HOSPITAL | Age: 60
Discharge: HOME | End: 2020-06-29
Attending: ORTHOPAEDIC SURGERY
Payer: MEDICAID

## 2020-06-29 DIAGNOSIS — M25.372 LEFT ANKLE INSTABILITY: ICD-10-CM

## 2020-06-29 PROCEDURE — 73610 X-RAY EXAM OF ANKLE: CPT | Mod: TC,FY,LT

## 2020-06-29 PROCEDURE — 73610 XR ANKLE COMPLETE 3 VIEW LEFT: ICD-10-PCS | Mod: 26,LT,, | Performed by: RADIOLOGY

## 2020-06-29 PROCEDURE — 73610 X-RAY EXAM OF ANKLE: CPT | Mod: 26,LT,, | Performed by: RADIOLOGY

## 2020-07-06 ENCOUNTER — CLINICAL SUPPORT (OUTPATIENT)
Dept: LAB | Facility: HOSPITAL | Age: 60
End: 2020-07-06
Attending: NURSE PRACTITIONER
Payer: MEDICAID

## 2020-07-06 ENCOUNTER — HOSPITAL ENCOUNTER (OUTPATIENT)
Dept: PREADMISSION TESTING | Facility: HOSPITAL | Age: 60
Discharge: HOME | End: 2020-07-06
Attending: ORTHOPAEDIC SURGERY
Payer: MEDICAID

## 2020-07-06 ENCOUNTER — ANESTHESIA EVENT (OUTPATIENT)
Dept: SURGERY | Facility: HOSPITAL | Age: 60
End: 2020-07-06
Payer: MEDICAID

## 2020-07-06 VITALS
OXYGEN SATURATION: 95 % | HEIGHT: 67 IN | BODY MASS INDEX: 29.11 KG/M2 | RESPIRATION RATE: 20 BRPM | DIASTOLIC BLOOD PRESSURE: 84 MMHG | TEMPERATURE: 99 F | WEIGHT: 185.5 LBS | SYSTOLIC BLOOD PRESSURE: 122 MMHG | HEART RATE: 89 BPM

## 2020-07-06 DIAGNOSIS — Z01.818 PREOP EXAMINATION: ICD-10-CM

## 2020-07-06 DIAGNOSIS — Z01.818 PREOP TESTING: Primary | ICD-10-CM

## 2020-07-06 LAB — SARS-COV-2 RDRP RESP QL NAA+PROBE: NEGATIVE

## 2020-07-06 PROCEDURE — 93005 ELECTROCARDIOGRAM TRACING: CPT

## 2020-07-06 PROCEDURE — 97116 GAIT TRAINING THERAPY: CPT

## 2020-07-06 PROCEDURE — 97161 PT EVAL LOW COMPLEX 20 MIN: CPT

## 2020-07-06 PROCEDURE — U0002 COVID-19 LAB TEST NON-CDC: HCPCS

## 2020-07-06 RX ORDER — TRAZODONE HYDROCHLORIDE 50 MG/1
100 TABLET ORAL NIGHTLY PRN
COMMUNITY
End: 2020-10-02 | Stop reason: CLARIF

## 2020-07-06 RX ORDER — SODIUM CHLORIDE, SODIUM LACTATE, POTASSIUM CHLORIDE, CALCIUM CHLORIDE 600; 310; 30; 20 MG/100ML; MG/100ML; MG/100ML; MG/100ML
INJECTION, SOLUTION INTRAVENOUS CONTINUOUS
Status: CANCELLED | OUTPATIENT
Start: 2020-07-06

## 2020-07-06 RX ORDER — PROPRANOLOL HYDROCHLORIDE 40 MG/1
20 TABLET ORAL 2 TIMES DAILY
Status: ON HOLD | COMMUNITY
End: 2024-03-04

## 2020-07-06 RX ORDER — FLUOXETINE 10 MG/1
30 CAPSULE ORAL DAILY
COMMUNITY
End: 2020-10-02 | Stop reason: CLARIF

## 2020-07-06 RX ORDER — LIDOCAINE HYDROCHLORIDE 10 MG/ML
1 INJECTION, SOLUTION EPIDURAL; INFILTRATION; INTRACAUDAL; PERINEURAL ONCE
Status: CANCELLED | OUTPATIENT
Start: 2020-07-06 | End: 2020-07-06

## 2020-07-06 NOTE — H&P
Reason For Visit  Right ankle pain      History of Present Illness  60-year-old female presents for evaluation of left ankle injury. The patient was injured on 6/9/2020 when she slipped on a rug at home causing a twisting injury to her left ankle. She was seen in a local emergency department and diagnosed with an ankle fracture. She was sent here for further evaluation. She was placed in a splint at the time of injury and has been walking on the splint. She complains of ankle pain. Pain is intermittent. Relieved by elevation and nonweightbearing. Pain is dull and throbbing. Patient has no radicular complaints. No history of left ankle pain or injury.         Allergies   · Augmentin   · morphine   · Seafood    Current Meds    Medication Name Instruction   Antivert 25 MG TABS TAKE 1 TABLET EVERY 8 TO 12 HOURS AS NEEDED   Coreg 6.25 MG Oral Tablet TAKE 1 TABLET BY MOUTH TWICE DAILY   Cozaar TABS TAKE 1 TABLET DAILY   Lexapro 10 MG Oral Tablet TAKE 1 TABLET BEDTIME   Xanax 0.5 MG Oral Tablet TAKE ONE TABLET BY MOUTH AT BEDTIME   Zantac TABS TAKE 1 TABLET BY MOUTH AT BEDTIME FOR STOMACH     Active Problems   · Ankle instability, left (M25.372)   · Anxiety (F41.9)   · Hypertension (I10)   · Vertigo (R42)    Social History   · Never a smoker    Review of Systems  See chart for complete patient survey including review of systems, medical history, surgical history, family history, and social history; this document was personally reviewed today.     In brief the patient has complaints of left ankle pain with weightbearing.      Results/Data    Right ankle x-rays  Indication: Right ankle pain  Study: Right ankle radiographs  Results: 3 views of the ankle are taken including an AP, mortise, lateral view. These demonstrate a bimalleolar ankle fracture with displacement and demonstrating an unstable ankle fracture pattern with a oblique fracture of the lateral malleolus and a irregular vertical shear of the medial malleolus.. No  other fractures or dislocations are noted in these images.     Vitals   Recorded: 29Jun2020 09:53AM   Height 5 ft 6 in   Weight 161 lb    BMI Calculated 25.99   BSA Calculated 1.82   Systolic 103   Diastolic 70   Heart Rate 66   Pain Scale 5     Physical Exam  Vital signs are noted in the chart above.     The patient appears generally well.    The patient is oriented x 3.    Mood and affect are appropriate and normal.     Gait is nonweightbearing on the left lower extremity.    Left ankle is examined splint is removed. There is mild swelling over the medial lateral aspect of the ankle. She has pain with ankle range of motion. Tenderness to palpation at the medial lateral malleoli. Ankle range of motion is thus not tested further. Motor and sensory function grossly intact throughout the digits of the foot. Distal extremities warm well perfused.    Right ankle examined for comparison. No swelling noted. Normal range of motion of the ankle. Grossly normal motor and sensory function. Distal digits warm well perfused.      Assessment   1. Closed bimalleolar fracture of left ankle, initial encounter (S82.915A)   · Patient has an unstable bimalleolar left ankle fracture which should be treated with      surgical stabilization via open reduction internal fixation. I discussed the treatment      options and the plan with the patient. We will need surgical clearance prior      to proceeding with surgery. I will plan to do her at the earliest convenience.    Plan  1. Patient placed in a AO splint left lower extremity, nonweightbearing  2. Tylenol 650 mg 3 times daily as needed for pain  3. Surgery planned for 7/7/2020  4. Patient will need medical clearance prior to surgery.

## 2020-07-06 NOTE — ANESTHESIA PREPROCEDURE EVALUATION
07/06/2020  Taty Kenney is a 60 y.o., female scheduled for left ankle ORIF under [facility] on 7/7/2020.    Past Medical History:   Diagnosis Date    Anxiety     Depression     Hypertension      Past Surgical History:   Procedure Laterality Date    HYSTERECTOMY       Anesthesia Evaluation    I have reviewed the Patient Summary Reports.    I have reviewed the Nursing Notes. I have reviewed the NPO Status.   I have reviewed the Medications.     Review of Systems  Anesthesia Hx:  Hx of Anesthetic complications PONV Denies Family Hx of Anesthesia complications.    Social:  Former Smoker, No Alcohol Use    Hematology/Oncology:  Hematology Normal        Cardiovascular:   Exercise tolerance: good Hypertension  Denies Angina.     States she takes propanolol for HTN   Pulmonary:  Pulmonary Normal  Denies Shortness of breath.    Renal/:  Renal/ Normal     Hepatic/GI:  Hepatic/GI Normal    Neurological:  Neurology Normal    Endocrine:  Endocrine Normal    Psych:   depression          Physical Exam  General:  Well nourished    Airway/Jaw/Neck:  Airway Findings: Mouth Opening: Normal Tongue: Normal  General Airway Assessment: Adult  Mallampati: II       Chest/Lungs:  Chest/Lungs Findings: Clear to auscultation, Normal Respiratory Rate     Heart/Vascular:  Heart Findings: Rate: Normal  Sounds: Normal        Mental Status:  Mental Status Findings:  Cooperative, Alert and Oriented         Anesthesia Plan  Type of Anesthesia, risks & benefits discussed:  Anesthesia Type:  general, regional  Patient's Preference:   Intra-op Monitoring Plan: standard ASA monitors  Intra-op Monitoring Plan Comments:   Post Op Pain Control Plan: multimodal analgesia  Post Op Pain Control Plan Comments:   Induction:   IV  Beta Blocker:  Patient is on a Beta-Blocker and has received one dose within the past 24 hours (No further  documentation required).       Informed Consent: Patient understands risks and agrees with Anesthesia plan.  Questions answered. Anesthesia consent signed with patient.  ASA Score: 2     Day of Surgery Review of History & Physical:        Anesthesia Plan Notes:           Ready For Surgery From Anesthesia Perspective.

## 2020-07-06 NOTE — DISCHARGE INSTRUCTIONS
Your surgery is scheduled for 7/7.    Please report to [facility] on the 1st Floor at .715 a.m.    THIS TIME IS SUBJECT TO CHANGE.  YOU WILL RECEIVE A PHONE CALL THE DAY BEFORE SURGERY BY 3:30 PM TO CONFIRM YOUR TIME OF ARRIVAL.  IF YOU HAVE NOT RECEIVED A PHONE CALL BY 3:30 PM THE DAY BEFORE YOUR SURGERY PLEASE CALL [phone]     INSTRUCTIONS IMPORTANT!!!  ¨ Do not eat or drink after 12 midnight-including water. OK to brush teeth, no   gum, candy or mints!    ¨ Take only these medicines with a small swallow of water-morning of surgery.  Propranolol        ____  Proceed to [facility] on *** for additional testing.        ____  Do not wear makeup, including mascara.  ____  No powder, lotions or creams to surgical area.  ____  Please remove all jewelry, including piercings and leave at home.  ____  No money or valuables needed. Please leave at home.  ____  Please bring any documents given by your doctor.  ____  If going home the same day, arrange for a ride home. You will not be able to             drive if Anesthesia was used.  ____  Children under 18 years require a parent / guardian present the entire time             they are in surgery / recovery.  ____  Wear loose fitting clothing. Allow for dressings, bandages.  ____  Stop Aspirin, Ibuprofen, Motrin and Aleve at least 3-5 days before surgery, unless otherwise instructed by your doctor, or the nurse.   You MAY use Tylenol/acetaminophen until day of surgery.  ____  Wash the surgical area with Hibiclens the night before surgery, and again the             morning of surgery.  Be sure to rinse hibiclens off completely (if instructed by   nurse).  ____  If you take diabetic medication, do not take am of surgery unless instructed by Doctor.  ____  Call MD for temperature above 101 degrees or any other signs of infection such as Urinary (bladder) infection, Upper respiratory infection, skin boils, etc.  ____ Stop taking any Fish Oil supplement or any  Vitamins that contain Vitamin E at least 5 days prior to surgery.  ____ Do Not wear your contact lenses the day of your procedure.  You may wear your glasses.      ____Do not shave surgical site for 3 days prior to surgery.  ____ Practice Good hand washing before, during, and after procedure.      I have read or had read and explained to me, and understand the above information.  Additional comments or instructions:  For additional questions call [phone]      ANESTHESIA SIDE EFFECTS  -For the first 24 hours after surgery:  Do not drive, use heavy equipment, make important decisions, or drink alcohol  -It is normal to feel sleepy for several hours.  Rest until you are more awake.  -Have someone stay with you, if needed.  They can watch for problems and help keep you safe.  -Some possible post anesthesia side effects include: nausea and vomiting, sore throat and hoarseness, sleepiness, and dizziness.        Pre-Op Bathing Instructions    Before surgery, you can play an important role in your own health.    Because skin is not sterile, we need to be sure that your skin is as free of germs as possible. By following the instructions below, you can reduce the number of germs on your skin before surgery.    IMPORTANT: You will need to shower with a special soap called Hibiclens*, available at any pharmacy.  If you are allergic to Chlorhexidine (the antiseptic in Hibiclens), use an antibacterial soap such as Dial Soap for your preoperative shower.  You will shower with Hibiclens both the night before your surgery and the morning of your surgery.  Do not use Hibiclens on the head, face or genitals to avoid injury to those areas.    STEP #1: THE NIGHT BEFORE YOUR SURGERY     1. Do not shave the area of your body where your surgery will be performed.  2. Shower and wash your hair and body as usual with your normal soap and shampoo.  3. Rinse your hair and body thoroughly after you shower to remove all soap residue.  4. With  your hand, apply one packet of Hibiclens soap to the surgical site.   5. Wash the site gently for five (5) minutes. Do not scrub your skin too hard.   6. Do not wash with your regular soap after Hibiclens is used.  7. Rinse your body thoroughly.  8. Pat yourself dry with a clean, soft towel.  9. Do not use lotion, cream, or powder.  10. Wear clean clothes.    STEP #2: THE MORNING OF YOUR SURGERY     1. Repeat Step #1.    * Not to be used by people allergic to Chlorhexidine.      Having Ankle Fracture Open Reduction and Internal Fixation (ORIF)  Open reduction and internal fixation (ORIF) is a type of treatment to fix a broken bone. It puts the pieces of a broken bone back together so they can heal. Open reduction means the bones are put back in place during a surgery. Internal fixation means that special hardware is used to hold the bone pieces together. This helps the bone heals correctly. The procedure is done by an orthopedic surgeon. This is a doctor with special training in treating bone, joint, and muscle problems.  What to tell your healthcare provider  Make sure you tell the healthcare provider about all medicines you take, including over-the-counter medicines, such as aspirin. Tell him or her about all vitamins, herbs, and other supplements you take. Also tell the provider the last time you had something to eat or drink. And tell your provider if you:  · Have had any recent changes in your health, such as an infection or fever  · Are sensitive or allergic to any medicines, latex, tape, or anesthetic medicines (local and general)  · Are pregnant or think you may be pregnant  Tests before your surgery  You may have an X-ray or a CT scan to look at your tibia and fibula.  Getting ready for your surgery  ORIF often takes place as emergency surgery after an accident or injury. Before this procedure, a healthcare provider will ask about your health history and give you a physical exam.  In some cases, ankle  fracture ORIF is planned. Your surgery may be done after the swelling in your ankle has gone down. You might need to have your ankle held in place while you wait for your surgery. Talk with your healthcare provider how to get ready for your surgery. You may need to stop taking some medicines before the procedure, such as blood thinners and aspirin. If you smoke, you may need to stop before your surgery. Smoking can delay healing. Talk with your healthcare provider if you need help to stop smoking.  Also, make sure to:  · Ask a family member or friend to take you home from the hospital. You cannot drive yourself.  · Plan some changes at home to help you recover. You may need help at home.  · Not eat or drink after midnight the night before your surgery.  · Follow all other instructions from your healthcare provider.  You may be asked to sign a consent form that gives your permission to do the procedure. Read the form carefully. Ask questions if something is not clear.  On the day of surgery  Your surgeon will explain the details of your surgery. These details will depend on where your injury is and how serious it is. An orthopedic surgeon and a team of specialized nurses will do the surgery. The preparation and surgery may take a couple of hours. In general, you can expect the following:  · You will likely have general anesthesia.This is medicine to prevent pain and make you sleep through the surgery. Or you may have regional anesthesia to numb the area and medicine to help you relax and sleep through the surgery.  · A healthcare provider watches your vital signs, like your heart rate and blood pressure, during the surgery.  · After cleaning the skin, your surgeon will make a cut (incision) through the skin and muscle of your ankle.  · The surgeon will put the pieces of your ankle bones back into alignment (reduction).  · The pieces of the broken bones will be secured to each other (fixation). Your doctor may use  screws, metal plates, wires, or pins.  · Other repairs are made to the area as needed.  · The layers of muscle and skin around your ankle will be closed with stitches (sutures).  After your surgery  Talk with your surgeon about what you can expect after your surgery. You may go home the same day. Or you may stay overnight in the hospital. Before leaving the hospital, you will likely have X-rays taken of your ankle. This is to check the repair.  You will have some pain after the surgery. Your doctor will tell you what pain medicine you can take to help reduce the pain. Avoid certain over-the-counter medicines for pain as instructed. Some of these may interfere with bone healing. You can also use ice packs to help lessen pain and swelling.  You may be told to keep your ankle elevated for a period of time after your surgery. Youll also need to not move your ankle for a while. Often, this means wearing a brace, perhaps for several weeks. Youll get instructions about how to move your leg and when you can put weight on it. Your surgeon may also tell you to eat foods high in calcium and vitamin D to help with bone healing. You may need to take medicine to prevent blood clots (blood thinner) for a little while after your surgery. Follow all your doctors instructions carefully.  Follow-up care  Make sure to keep all of your follow-up appointments. You may need to have your stitches removed a week or so after your surgery.  You may have physical therapy to improve the strength and movement of your ankle. The therapy may include treatments and exercises. The therapy improves your chances of a full recovery. Most people are able to return to all their normal activities within a few months.     When to call your healthcare provider  Call your healthcare provider right away if you have any of these:  · Fever of 100.4°F (38°C) or higher  · Redness, swelling, or fluid leaking from your incision that gets worse  · Pain that gets  worse  · Loss of feeling in your foot or leg   Date Last Reviewed: 8/6/2015  © 5409-3098 [employer]. [address] [place] [address], [place] [address]. All rights reserved. This information is not intended as a substitute for professional medical care. Always follow your healthcare professional's instructions.      Types of Anesthesia  Your anesthesiologist is a key member of your surgical team. He or she gives you anesthetics (medications to keep you comfortable and decrease your awareness of surgery) and monitors your condition to keep you safe during surgery. You will have 1 of 3 kinds of anesthesia during your surgery.  Monitored anesthesia care (MAC)  · Often used for surgery that is short or not too invasive.  · Sedatives (medicines to relax you) are given through an IV (intravenous) line.  · The area around the surgical site is usually numbed with a local anesthetic.  · You may choose to remain awake or sleep lightly.  Regional anesthesia (sometimes called spinal epidural or sean block)  · Often used for surgery on the arms, legs, and abdomen. It is also used during childbirth.  · A specific region of your body is numbed by injecting anesthetic near nerves, near your spine, or near the operative site.  · You may also be given sedatives through an IV line to relax you.  · With regional anesthesia, you may choose to remain awake or sleep lightly.  General anesthesia  · Often used for extensive surgery, such as on the heart, brain, or abdominal operation.  · Also used when the patient wants to be totally asleep.  · May be given as a gas that you breathe and as medicines that are injected through an IV line.  · Because you are asleep, you feel no pain and remember nothing of the surgery.  The risks and complications of anesthesia depend on your overall health. If you are healthy, the risks are low. The risks are higher for patients with heart or lung problems. Your anesthesiologist or nurse anesthetist  will discuss the risks with you.   Date Last Reviewed: 12/1/2016  © 0525-5311 [email or web address]. [address] [place] [address], [place] [address]. All rights reserved. This information is not intended as a substitute for professional medical care. Always follow your healthcare professional's instructions.

## 2020-07-06 NOTE — PRE-PROCEDURE INSTRUCTIONS
Shari, [phone]      Allergies, medical, surgical, family and psychosocial histories reviewed with patient. Periop plan of care reviewed. Preop instructions given, including medications to take and to hold. Hibiclens soap and instructions on use given. Time allotted for questions to be addressed.  Patient verbalized understanding.

## 2020-07-06 NOTE — PROGRESS NOTES
Physical Therapy   PT Eval and Tx - pre admission    Taty Kenney   MRN: [id number]         Pt seen from 257pm to 325 pm for a total of 28 minutes  Low Complexity Evaluation x 10 minutes; 18 minutes gait training in NWB LLE status    Patient presented to [place], L LE in ace wrapped splint, in a wheelchair. Pt is scheduled for ORIF to L ankle tomorrow with Dr. DANELLE Naidu IV. Pt is to be NWB post surgical intervention    Patient lives with her 90 year old mother, who uses a walker. Her home is a single story home with a threshold entryway. She has access to both a tub and a walk in shower. She has crutches which were provided to her at [facility] without training and she is extremely uncomfortable with them. She also owns a rollator and a Front wheeled walker as well as a bedside commode. paitent reports that her brother will be coming to see her and daughter reports she checks on her every day.     Since injury and release from , she has NOT been using her crutches, nor other device, and has been heel weight bearing on Left.    Patient and daughter who was present, were visually and verbally instructed in necessity of maintaining NWB status to assure integrity of surgery. Sit to stand transfers and steps with RW while maintaining NWB were demonstrated. Pt required CGA / min A to return demonstration of same, cues provided on proper sit to stand transfer via pushing up with UE, difficulty maintaining LLE in NWB position. . Appeared hesitant and somewhat fearful stating the chair was too low. Instructed in benefits of using TTB for tub transfer to avoid slipping in tub and for ease of transfer.  Instructed in going up / down threshold entry to door via placement of chair on upper level to avoid having to hop. Instructed in stand pivot transfer from surface to surface while maintaining NWB. Focused on placement of bedside commode for transfer, at 90 degrees to surface already sitting on. Instructed on use of gait  belt to assist with sit to stand transfer and for support during minimal ambulation Recommendation made wheelchair with elevating leg rests - pt said she would look for one in her community first and will let us know if she would like for us to order one for her.     Will provide patient with gait belt tomorrow on day of surgery. Patient will need assistance post op as she required CGA/min A even at time of preadmit with difficulty maintaining NWB.  .  Olivia Covarrubias, PT  7/6/2020

## 2020-07-07 ENCOUNTER — HOSPITAL ENCOUNTER (OUTPATIENT)
Facility: HOSPITAL | Age: 60
Discharge: HOME | End: 2020-07-07
Attending: ORTHOPAEDIC SURGERY | Admitting: ORTHOPAEDIC SURGERY
Payer: MEDICAID

## 2020-07-07 ENCOUNTER — ANESTHESIA (OUTPATIENT)
Dept: SURGERY | Facility: HOSPITAL | Age: 60
End: 2020-07-07
Payer: MEDICAID

## 2020-07-07 VITALS
BODY MASS INDEX: 29.11 KG/M2 | TEMPERATURE: 98 F | DIASTOLIC BLOOD PRESSURE: 63 MMHG | RESPIRATION RATE: 16 BRPM | OXYGEN SATURATION: 95 % | SYSTOLIC BLOOD PRESSURE: 116 MMHG | WEIGHT: 185.5 LBS | HEIGHT: 67 IN | HEART RATE: 66 BPM

## 2020-07-07 DIAGNOSIS — S82.842A BIMALLEOLAR ANKLE FRACTURE, LEFT, CLOSED, INITIAL ENCOUNTER: Primary | ICD-10-CM

## 2020-07-07 PROCEDURE — C1769 GUIDE WIRE: HCPCS | Performed by: ORTHOPAEDIC SURGERY

## 2020-07-07 PROCEDURE — C1713 ANCHOR/SCREW BN/BN,TIS/BN: HCPCS | Performed by: ORTHOPAEDIC SURGERY

## 2020-07-07 PROCEDURE — 27201423 OPTIME MED/SURG SUP & DEVICES STERILE SUPPLY: Performed by: ORTHOPAEDIC SURGERY

## 2020-07-07 PROCEDURE — 71000039 HC RECOVERY, EACH ADD'L HOUR: Performed by: ORTHOPAEDIC SURGERY

## 2020-07-07 PROCEDURE — 63600175 PHARM REV CODE 636 W HCPCS: Performed by: NURSE ANESTHETIST, CERTIFIED REGISTERED

## 2020-07-07 PROCEDURE — 36000709 HC OR TIME LEV III EA ADD 15 MIN: Performed by: ORTHOPAEDIC SURGERY

## 2020-07-07 PROCEDURE — 37000008 HC ANESTHESIA 1ST 15 MINUTES: Performed by: ORTHOPAEDIC SURGERY

## 2020-07-07 PROCEDURE — 01480 ANES OPEN PX LOWER L/A/F NOS: CPT | Performed by: ORTHOPAEDIC SURGERY

## 2020-07-07 PROCEDURE — 37000009 HC ANESTHESIA EA ADD 15 MINS: Performed by: ORTHOPAEDIC SURGERY

## 2020-07-07 PROCEDURE — 71000033 HC RECOVERY, INTIAL HOUR: Performed by: ORTHOPAEDIC SURGERY

## 2020-07-07 PROCEDURE — 76942 ECHO GUIDE FOR BIOPSY: CPT | Mod: 59 | Performed by: STUDENT IN AN ORGANIZED HEALTH CARE EDUCATION/TRAINING PROGRAM

## 2020-07-07 PROCEDURE — 36000708 HC OR TIME LEV III 1ST 15 MIN: Performed by: ORTHOPAEDIC SURGERY

## 2020-07-07 PROCEDURE — 64448 NJX AA&/STRD FEM NRV NFS IMG: CPT | Performed by: STUDENT IN AN ORGANIZED HEALTH CARE EDUCATION/TRAINING PROGRAM

## 2020-07-07 PROCEDURE — 25000003 PHARM REV CODE 250: Performed by: NURSE ANESTHETIST, CERTIFIED REGISTERED

## 2020-07-07 PROCEDURE — 63600175 PHARM REV CODE 636 W HCPCS: Performed by: ORTHOPAEDIC SURGERY

## 2020-07-07 PROCEDURE — 25000003 PHARM REV CODE 250: Performed by: ORTHOPAEDIC SURGERY

## 2020-07-07 PROCEDURE — 63600175 PHARM REV CODE 636 W HCPCS: Performed by: STUDENT IN AN ORGANIZED HEALTH CARE EDUCATION/TRAINING PROGRAM

## 2020-07-07 PROCEDURE — 71000015 HC POSTOP RECOV 1ST HR: Performed by: ORTHOPAEDIC SURGERY

## 2020-07-07 PROCEDURE — 71000016 HC POSTOP RECOV ADDL HR: Performed by: ORTHOPAEDIC SURGERY

## 2020-07-07 PROCEDURE — 25000003 PHARM REV CODE 250: Performed by: NURSE PRACTITIONER

## 2020-07-07 PROCEDURE — 25000003 PHARM REV CODE 250: Performed by: ANESTHESIOLOGY

## 2020-07-07 PROCEDURE — 63600175 PHARM REV CODE 636 W HCPCS: Performed by: NURSE PRACTITIONER

## 2020-07-07 DEVICE — SCREW BONE LOCK T10 3.5X12MM
Type: IMPLANTABLE DEVICE | Site: ANKLE | Status: NON-FUNCTIONAL
Removed: 2020-10-06

## 2020-07-07 DEVICE — SCREW BONE NON LOCK 3.5X34MM
Type: IMPLANTABLE DEVICE | Site: ANKLE | Status: NON-FUNCTIONAL
Removed: 2020-10-06

## 2020-07-07 DEVICE — SCREW BONE NON LOCK 3.5X28MM
Type: IMPLANTABLE DEVICE | Site: ANKLE | Status: NON-FUNCTIONAL
Removed: 2020-10-06

## 2020-07-07 DEVICE — SCREW BONE NON LOCK 3.5X14MM
Type: IMPLANTABLE DEVICE | Site: ANKLE | Status: NON-FUNCTIONAL
Removed: 2020-10-06

## 2020-07-07 DEVICE — PLATE BONE FIB DIS LAT VARIAX
Type: IMPLANTABLE DEVICE | Site: ANKLE | Status: NON-FUNCTIONAL
Removed: 2020-10-06

## 2020-07-07 DEVICE — SCREW BONE NON LOCK 3.5X12MM
Type: IMPLANTABLE DEVICE | Site: ANKLE | Status: NON-FUNCTIONAL
Removed: 2020-10-06

## 2020-07-07 DEVICE — SCREW BONE LOCK T10 3.5X14MM
Type: IMPLANTABLE DEVICE | Site: ANKLE | Status: NON-FUNCTIONAL
Removed: 2020-10-06

## 2020-07-07 DEVICE — SCREW BONE LOCK T10 3.5X10MM
Type: IMPLANTABLE DEVICE | Site: ANKLE | Status: NON-FUNCTIONAL
Removed: 2020-10-06

## 2020-07-07 DEVICE — K-WIRE TROCAR POINT 1.6X150MM
Type: IMPLANTABLE DEVICE | Site: ANKLE | Status: NON-FUNCTIONAL
Removed: 2020-10-06

## 2020-07-07 RX ORDER — ASPIRIN 81 MG/1
81 TABLET ORAL DAILY
Qty: 30 TABLET | Refills: 11 | Status: ON HOLD | OUTPATIENT
Start: 2020-07-07 | End: 2020-07-26 | Stop reason: HOSPADM

## 2020-07-07 RX ORDER — ACETAMINOPHEN 500 MG
1000 TABLET ORAL EVERY 8 HOURS
Qty: 84 TABLET | Refills: 0 | Status: SHIPPED | OUTPATIENT
Start: 2020-07-07 | End: 2020-07-21

## 2020-07-07 RX ORDER — SODIUM CHLORIDE 0.9 % (FLUSH) 0.9 %
10 SYRINGE (ML) INJECTION
Status: DISCONTINUED | OUTPATIENT
Start: 2020-07-07 | End: 2020-07-07 | Stop reason: HOSPADM

## 2020-07-07 RX ORDER — OXYCODONE AND ACETAMINOPHEN 5; 325 MG/1; MG/1
1 TABLET ORAL
Status: DISCONTINUED | OUTPATIENT
Start: 2020-07-07 | End: 2020-07-07 | Stop reason: HOSPADM

## 2020-07-07 RX ORDER — GLYCOPYRROLATE 0.2 MG/ML
INJECTION INTRAMUSCULAR; INTRAVENOUS
Status: DISCONTINUED | OUTPATIENT
Start: 2020-07-07 | End: 2020-07-07

## 2020-07-07 RX ORDER — PHENYLEPHRINE HYDROCHLORIDE 10 MG/ML
INJECTION INTRAVENOUS
Status: DISCONTINUED | OUTPATIENT
Start: 2020-07-07 | End: 2020-07-07

## 2020-07-07 RX ORDER — CEFAZOLIN SODIUM 2 G/50ML
2 SOLUTION INTRAVENOUS
Status: COMPLETED | OUTPATIENT
Start: 2020-07-07 | End: 2020-07-07

## 2020-07-07 RX ORDER — LIDOCAINE HYDROCHLORIDE 10 MG/ML
1 INJECTION, SOLUTION EPIDURAL; INFILTRATION; INTRACAUDAL; PERINEURAL ONCE
Status: DISCONTINUED | OUTPATIENT
Start: 2020-07-07 | End: 2020-07-07 | Stop reason: HOSPADM

## 2020-07-07 RX ORDER — OXYCODONE HYDROCHLORIDE 10 MG/1
10 TABLET ORAL EVERY 6 HOURS PRN
Qty: 28 TABLET | Refills: 0 | Status: SHIPPED | OUTPATIENT
Start: 2020-07-07 | End: 2020-07-14

## 2020-07-07 RX ORDER — BUPIVACAINE HCL/EPINEPHRINE 0.25-.0005
VIAL (ML) INJECTION
Status: DISCONTINUED | OUTPATIENT
Start: 2020-07-07 | End: 2020-07-07 | Stop reason: HOSPADM

## 2020-07-07 RX ORDER — NEOSTIGMINE METHYLSULFATE 1 MG/ML
INJECTION, SOLUTION INTRAVENOUS
Status: DISCONTINUED | OUTPATIENT
Start: 2020-07-07 | End: 2020-07-07

## 2020-07-07 RX ORDER — MIDAZOLAM HYDROCHLORIDE 1 MG/ML
INJECTION, SOLUTION INTRAMUSCULAR; INTRAVENOUS
Status: DISCONTINUED | OUTPATIENT
Start: 2020-07-07 | End: 2020-07-07

## 2020-07-07 RX ORDER — FLUOXETINE HYDROCHLORIDE 20 MG/1
60 CAPSULE ORAL DAILY
COMMUNITY
End: 2021-06-22

## 2020-07-07 RX ORDER — SUCCINYLCHOLINE CHLORIDE 20 MG/ML
INJECTION INTRAMUSCULAR; INTRAVENOUS
Status: DISCONTINUED | OUTPATIENT
Start: 2020-07-07 | End: 2020-07-07

## 2020-07-07 RX ORDER — PROPOFOL 10 MG/ML
VIAL (ML) INTRAVENOUS
Status: DISCONTINUED | OUTPATIENT
Start: 2020-07-07 | End: 2020-07-07

## 2020-07-07 RX ORDER — HYDROMORPHONE HYDROCHLORIDE 2 MG/ML
0.5 INJECTION, SOLUTION INTRAMUSCULAR; INTRAVENOUS; SUBCUTANEOUS EVERY 5 MIN PRN
Status: DISCONTINUED | OUTPATIENT
Start: 2020-07-07 | End: 2020-07-07 | Stop reason: HOSPADM

## 2020-07-07 RX ORDER — ACETAMINOPHEN 10 MG/ML
INJECTION, SOLUTION INTRAVENOUS
Status: DISCONTINUED | OUTPATIENT
Start: 2020-07-07 | End: 2020-07-07

## 2020-07-07 RX ORDER — FENTANYL CITRATE 50 UG/ML
INJECTION, SOLUTION INTRAMUSCULAR; INTRAVENOUS
Status: DISCONTINUED | OUTPATIENT
Start: 2020-07-07 | End: 2020-07-07

## 2020-07-07 RX ORDER — SODIUM CHLORIDE 9 MG/ML
INJECTION, SOLUTION INTRAVENOUS CONTINUOUS
Status: DISCONTINUED | OUTPATIENT
Start: 2020-07-07 | End: 2020-07-07 | Stop reason: HOSPADM

## 2020-07-07 RX ORDER — ONDANSETRON 2 MG/ML
INJECTION INTRAMUSCULAR; INTRAVENOUS
Status: DISCONTINUED | OUTPATIENT
Start: 2020-07-07 | End: 2020-07-07

## 2020-07-07 RX ORDER — SODIUM CHLORIDE, SODIUM LACTATE, POTASSIUM CHLORIDE, CALCIUM CHLORIDE 600; 310; 30; 20 MG/100ML; MG/100ML; MG/100ML; MG/100ML
INJECTION, SOLUTION INTRAVENOUS CONTINUOUS
Status: DISCONTINUED | OUTPATIENT
Start: 2020-07-07 | End: 2020-07-07 | Stop reason: HOSPADM

## 2020-07-07 RX ORDER — ROCURONIUM BROMIDE 10 MG/ML
INJECTION, SOLUTION INTRAVENOUS
Status: DISCONTINUED | OUTPATIENT
Start: 2020-07-07 | End: 2020-07-07

## 2020-07-07 RX ORDER — QUETIAPINE 300 MG/1
400 TABLET, FILM COATED, EXTENDED RELEASE ORAL NIGHTLY
COMMUNITY
End: 2021-06-22

## 2020-07-07 RX ORDER — LIDOCAINE HYDROCHLORIDE 20 MG/ML
INJECTION INTRAVENOUS
Status: DISCONTINUED | OUTPATIENT
Start: 2020-07-07 | End: 2020-07-07

## 2020-07-07 RX ORDER — SODIUM CHLORIDE 9 MG/ML
INJECTION, SOLUTION INTRAVENOUS CONTINUOUS
Status: ACTIVE | OUTPATIENT
Start: 2020-07-07

## 2020-07-07 RX ORDER — DEXAMETHASONE SODIUM PHOSPHATE 4 MG/ML
INJECTION, SOLUTION INTRA-ARTICULAR; INTRALESIONAL; INTRAMUSCULAR; INTRAVENOUS; SOFT TISSUE
Status: DISCONTINUED | OUTPATIENT
Start: 2020-07-07 | End: 2020-07-07

## 2020-07-07 RX ORDER — SCOLOPAMINE TRANSDERMAL SYSTEM 1 MG/1
1 PATCH, EXTENDED RELEASE TRANSDERMAL
Status: COMPLETED | OUTPATIENT
Start: 2020-07-07 | End: 2020-07-07

## 2020-07-07 RX ADMIN — ONDANSETRON 8 MG: 2 INJECTION, SOLUTION INTRAMUSCULAR; INTRAVENOUS at 02:07

## 2020-07-07 RX ADMIN — SUCCINYLCHOLINE CHLORIDE 120 MG: 20 INJECTION, SOLUTION INTRAMUSCULAR; INTRAVENOUS at 12:07

## 2020-07-07 RX ADMIN — MIDAZOLAM 2 MG: 1 INJECTION INTRAMUSCULAR; INTRAVENOUS at 11:07

## 2020-07-07 RX ADMIN — PHENYLEPHRINE HYDROCHLORIDE 100 MCG: 10 INJECTION INTRAVENOUS at 01:07

## 2020-07-07 RX ADMIN — OXYCODONE HYDROCHLORIDE AND ACETAMINOPHEN 1 TABLET: 5; 325 TABLET ORAL at 04:07

## 2020-07-07 RX ADMIN — SODIUM CHLORIDE, SODIUM LACTATE, POTASSIUM CHLORIDE, AND CALCIUM CHLORIDE: .6; .31; .03; .02 INJECTION, SOLUTION INTRAVENOUS at 11:07

## 2020-07-07 RX ADMIN — LIDOCAINE HYDROCHLORIDE 80 MG: 20 INJECTION, SOLUTION INTRAVENOUS at 12:07

## 2020-07-07 RX ADMIN — ROCURONIUM BROMIDE 5 MG: 10 INJECTION, SOLUTION INTRAVENOUS at 12:07

## 2020-07-07 RX ADMIN — PROPOFOL 150 MG: 10 INJECTION, EMULSION INTRAVENOUS at 12:07

## 2020-07-07 RX ADMIN — ROCURONIUM BROMIDE 30 MG: 10 INJECTION, SOLUTION INTRAVENOUS at 12:07

## 2020-07-07 RX ADMIN — FENTANYL CITRATE 50 MCG: 50 INJECTION, SOLUTION INTRAMUSCULAR; INTRAVENOUS at 02:07

## 2020-07-07 RX ADMIN — CEFAZOLIN SODIUM 2 G: 2 SOLUTION INTRAVENOUS at 12:07

## 2020-07-07 RX ADMIN — DEXAMETHASONE SODIUM PHOSPHATE 8 MG: 4 INJECTION, SOLUTION INTRA-ARTICULAR; INTRALESIONAL; INTRAMUSCULAR; INTRAVENOUS; SOFT TISSUE at 12:07

## 2020-07-07 RX ADMIN — ROPIVACAINE HYDROCHLORIDE 20 ML: 2 INJECTION, SOLUTION EPIDURAL; INFILTRATION at 11:07

## 2020-07-07 RX ADMIN — NEOSTIGMINE METHYLSULFATE 4 MG: 1 INJECTION INTRAVENOUS at 02:07

## 2020-07-07 RX ADMIN — FENTANYL CITRATE 100 MCG: 50 INJECTION, SOLUTION INTRAMUSCULAR; INTRAVENOUS at 12:07

## 2020-07-07 RX ADMIN — ACETAMINOPHEN 1000 MG: 10 INJECTION, SOLUTION INTRAVENOUS at 12:07

## 2020-07-07 RX ADMIN — GLYCOPYRROLATE 0.6 MG: 0.2 INJECTION, SOLUTION INTRAMUSCULAR; INTRAVENOUS at 02:07

## 2020-07-07 RX ADMIN — SCOPALAMINE 1 PATCH: 1 PATCH, EXTENDED RELEASE TRANSDERMAL at 08:07

## 2020-07-07 NOTE — DISCHARGE INSTRUCTIONS
Discharge Instructions for Foot Surgery  Arrange to have an adult drive you home after surgery. If you had general anesthesia, it may take a day or more to fully recover. So, for at least the next 24 hours: Do not drive or use machinery or power tools; do not drink alcohol; and do not make any major decisions.    Diet  Here are some dietary suggestions following surgery:   · Start with liquids and light foods (like dry toast, bananas, and applesauce). As you feel up to it, slowly return to your normal diet.  · Drink at least 6 to 8 glasses of water or other nonalcoholic fluids a day.  · To avoid nausea, eat before taking narcotic pain medicines.  ·   Medicines  It is important to follow these directions:   · Take all medicines as instructed.  · Take pain medicines on time. Do not wait until the pain is bad before taking your medicines.  · Avoid alcohol while on pain medicines.  Activity  These instructions are to help with your recovery:   · Sit or lie down when possible. Put a pillow under your heel to raise your foot above the level of your heart.  · Wrap an ice pack or bag of frozen peas in a thin cloth. Place it to the back of the knee for no longer than 20 minutes. Do this three times a day.  · Use crutches or a cane as directed.  · Follow your healthcare providers instructions about putting weight on your foot. Do not apply any weight to left foot  ·   Bandage and cast care  Here are tips to follow:   · Do not shower for 48 hours.  · When you can shower again, cover the bandage or cast with a plastic bag to keep it dry.  · Dont remove your bandage until your healthcare provider tells you to. If your bandage gets wet or dirty, check with your healthcare provider. You can likely replace it with a clean, dry one.  ·   What to expect  It is normal to have the following:  · Bruising and slight swelling of the foot and toes  · A small amount of blood on the dressing  Call your healthcare provider   Contact your  healthcare provider right away if you have any of the following:   · Continuous bleeding through the bandage  · Excessive swelling, increased bleeding, or redness  · Fever over 100.4°F (38°C) or chills  · Pain unrelieved by pain medicines  · Foot feels cold to the touch or numb  · Increased ache in your leg or foot  · Chest pain or shortness of breath  · Anything unusual that concerns you     ANESTHESIA  -For the first 24 hours after surgery:  Do not drive, use heavy equipment, make important decisions, or drink alcohol  -It is normal to feel sleepy for several hours.  Rest until you are more awake.  -Have someone stay with you, if needed.  They can watch for problems and help keep you safe.  -Some possible post anesthesia side effects include: nausea and vomiting, sore throat and hoarseness, sleepiness, and dizziness.    PAIN  -If you have pain after surgery, pain medicine will help you feel better.  Take it as directed, before pain becomes severe.  Most pain relievers taken by mouth need at least 20-30 minutes to start working.  -Do not drive or drink alcohol while taking pain medicine.  -Pain medication can upset your stomach.  Taking them with a little food may help.  -Other ways to help control pain: elevation, ice, and relaxation  -Call your surgeon if still having unmanageable pain an hour after taking pain medicine.  -Pain medicine can cause constipation.  Taking an over-the counter stool softener while on prescription pain medicine and drinking plenty of fluids can prevent this side effect.  -Call your surgeon if you have severe side effects like: breathing problems, trouble waking up, dizziness, confusion, or severe constipation.    NAUSEA  -Some people have nausea after surgery.  This is often because of anesthesia, pain, pain medicine, or the stress of surgery.  -Do not push yourself to eat.  Start off with clear liquids and soup.  Slowly move to solid foods.  Don't eat fatty, rich, spicy foods at first.   Eat smaller amounts.  -If you develop persistent nausea and vomiting please notify your surgeon immediately.    BLEEDING  -Different types of surgery require different types of care and dressing changes.  It is important to follow all instructions and advice from your surgeon.  Change dressing as directed.  Call your surgeon for any concerns regarding postop bleeding.    SIGNS OF INFECTION  -Signs of infection include: fever, swelling, drainage, and redness  -Notify your surgeon if you have a fever of 100.4 F (38.0 C) or higher.  -Notify your surgeon if you notice redness, swelling, increased pain, pus, or a foul smell at the incision site.        Time Line After Ankle ORIF    Day 1-2 after surgery   Out of bed as much as possible  Keep your operative leg elevated with ICE  Take your pain medication as soon as you start feeling pain  Take your Aspirin for DVT prophylaxis as prescribed    Day 3 after surgery   Keep splint clean and dry    Day 10-14   Follow up with surgeon for suture or staple removal     Notify Dr. Naidu for any problems or concerns

## 2020-07-07 NOTE — OP NOTE
Operative Report    LUBA JAVIER  : 1960  MRN: [id number]    Date of Procedure: 2020     Pre-op Diagnosis:    Left bimalleolar ankle fracture    Post-op Diagnosis:    Left trimalleolar ankle fracture    Procedure:   Open reduction internal fixation Left trimalleolar ankle fracture, closed reduction of posterior malleolar fragment (05406)     Surgeon: Triston Naidu IV, MD     Assisting Surgeon:  Carlos Ibrahim MD     Anesthesiologist:  see record    Anesthesia:   general and regional    Estimated Blood Loss: minimal mL         Specimens: none    Implant:  Hair lateral distal fibula plate, combination of locking and nonlocking screws  Hair Y Fracture plate, combination of locking and nonlocking screws    Indications for surgery:   Luba Javier is a 60 y.o. female patient who was seen in the clinic complaining of ankle pain after an injury.  History, exam and imaging were consistent with an ankle fracture which occurred nearly 4 weeks prior.  She did not immediately seek medical care because she thought her injury was minor.  After diagnosing her injury, the risks and benefits of nonoperative versus operative treatment were discussed.  The risks of surgery were discussed including, but not limited to, infection, bleeding, nerve injury, and vessel injury.  We also discussed the possibility of nonunion, malunion, the possibility of requiring further surgery.  We discussed the possibility of developing ankle arthritis, even if surgery was performed.  Understanding the risks, the patient elected to proceed with surgery.    Description of procedure:   Prior to the surgical procedure, the patient was identified in the preoperative holding area.  We had a thorough discussion about the risks and benefits of surgery including the expected post operative protocol.  The patient signed an informed consent and the operative extremity was marked.  A regional anesthesia block (popliteal fossa) was performed  by the Anesthesiology team prior to surgery.  Preoperative antibiotics were given prior to incision for infection prophylaxis. The patient was taken to the operating room and positioned on the table in the supine position.  After general anesthesia was induced, a well padded thigh tourniquet was placed.  The patient was subsequently prepped and draped in the usual orthopaedic sterile fashion.  A surgical timeout was performed confirming the surgical site and procedure prior to proceeding.        Attention was first turned to the distal fibular fracture.  A standard direct lateral approach to the distal fibula was conducted.  The superficial peroneal nerve was identified during the procedure and retracted anteriorly.  The fracture was identified.  The fracture was oblique with a long spike.  There was abundant fracture callus at the fracture site which was debrided with a combination of rongeur and periosteal elevator.  The fracture was irrigated and debrided then reduced and provisionally fixed using a combination of reduction clamps and K-wires.  Once adequate reduction was obtained visually, fluoroscopy was used to confirm adequate length and rotation of the distal fibula.  The plate was then affixed to the distal segment of the fibula first using a K wire.  The position of the plate was confirmed on fluoroscopy and the plate was subsequently affixed to the fibula using a combination of locking and nonlocking screws distally and cortical nonlocking screws proximally.      During lateral fluoroscopic xrays, a posterior malleolar fragment was noted which was not know prior to surgery.  It constituted less than 10% of the joint surface, thus it was treated without internal fixation.      Attention was now turned to the medial malleolus.  It was noted to be a comminuted vertical sheer type of fracture, requiring fixation in the distal comminuted segment and a buttress plate.  A standard medial approach to the medial  malleolus was performed.  Care was taken to avoid the saphenous nerve and vein, which were encountered and retracted anteriorly.  The fracture was identified, irrigated and debrided.  The tibial talar joint was irrigated to evacuate any debris.  The fracture was reduced and provisionally fixed using 2 K-wires.  Fluoroscopy was used to confirm appropriate reduction and rotation of the malleolar fragment.  The appropriate length [place] Y Fracture plate was applied to the medial tibia to buttress vertical fracture segments and locking screws were placed distally to fix the comminuted fragments.  Fluoroscopic imaging confirmed appropriate reduction of the fracture and thus the K wires were removed.         Finally, attention was turned to the syndesmosis.  The syndesmosis was stressed under fluoroscopic analysis and found to be stable.       The wounds were irrigated and closed in layers using a #1 Vicryl for the deepest layer, 2.0 Vicryl for the subcutaneous tissue, and a running 3.0 nylon for the skin.  The sponge, needle, and instrument counts were correct at the end of the case.  Xeroform and a dry sterile dressing was applied and the patient was placed in a plaster AO splint.  The patient tolerated the procedure well, was extubated, and was taken to recovery in stable condition.       Post-Operative Management  The patient will be nonweightbearing to the operative extremity with crutches.  The patient will follow up in my office ([phone]) 10-14 days after surgery for xrays and clinical evaluation. The patient will be treated with DVT chemoprophylaxis in the postoperative period.      Complications: No     Condition: Good     Disposition: PACU - hemodynamically stable.     Attestation: I was present and scrubbed for the entire procedure.    Implants:   Implant Name Type Inv. Item Serial No.  Lot No. LRB No. Used Action   K-WIRE TROCAR POINT [id number] - [id number]  K-WIRE TROCAR POINT [id number]   [employer].  Left 3 Implanted   PLATE BONE FIB DIS LAT VARIAX - [id number]  PLATE BONE FIB DIS LAT VARIAX  [employer].  Left 1 Implanted   SCREW BONE NON LOCK 3.5X12MM - [id number]  SCREW BONE NON LOCK 3.5X12MM  [employer].  Left 4 Implanted   SCREW BONE NON LOCK 3.5X14MM - [id number]  SCREW BONE NON LOCK 3.5X14MM  [employer].  Left 1 Implanted   SCREW BONE NON LOCK 3.5X18MM - [id number]  SCREW BONE NON LOCK 3.5X18MM  [employer].  Left 1 Implanted and Explanted   SCREW BONE LOCK T10 3.5X10MM - [id number]  SCREW BONE LOCK T10 3.5X10MM  [employer].  Left 1 Implanted   SCREW BONE LOCK T10 3.5X12MM - [id number]  SCREW BONE LOCK T10 3.5X12MM  [employer].  Left 1 Implanted   SCREW BONE LOCK T10 3.5X14MM - [id number]  SCREW BONE LOCK T10 3.5X14MM  [employer].  Left 2 Implanted   3.5 x 10 BONES SCREW     [employer].  Left 2 Implanted   6 HOLE Y FX PLATE     [employer].  Left 1 Implanted   SCREW BONE NON LOCK 3.5X34MM - [id number]  SCREW BONE NON LOCK 3.5X34MM  [employer].  Left 1 Implanted   LOCK SCREW 3.5 X 36       Left 1 Implanted   SCREW BONE NON LOCK 3.5X28MM - [id number]  SCREW BONE NON LOCK 3.5X28MM  [employer].  Left 2 Implanted   LOCK SCREW 3.5  X38      Left 1 Implanted

## 2020-07-07 NOTE — PROGRESS NOTES
Physical Therapy   Follow up Visit    Taty Kenney   MRN: [id number]         Met with patient and daughter this morning approximately 930 a.m. prior to patient leaving for surgery.  Patient had been provided with gait belt. Asked patient if she had decided on wheelchair - she and daughter reported that patients brother had initiated process and would be picking up w/c this afternoon. Re-educated family on need for elevating leg rest for LLE.  Daughter reported that she brought patient's crutches in car for patient to use to get into home. Advised daughter to utilize RW as per training yesterday since patient reported she did not like to use crutches and was not trained for them. Daughter and patient confident that patient would be able to use crutches to get into home. Re-iterated importance of following NWB precautions post op in order to assure integrity of surgery and proper healing. Daughter and patient again expressed confidence in their ability to do this.  Olivia Covarrubias, PT  7/7/2020

## 2020-07-07 NOTE — INTERVAL H&P NOTE
The patient has been examined and the H&P has been reviewed:    I concur with the findings and no changes have occurred since H&P was written.    Anesthesia/Surgery risks, benefits and alternative options discussed and understood by patient/family.          Active Hospital Problems    Diagnosis  POA    Bimalleolar ankle fracture, left, closed, initial encounter [S85.220O]  Yes      Resolved Hospital Problems   No resolved problems to display.

## 2020-07-07 NOTE — ANESTHESIA POSTPROCEDURE EVALUATION
Anesthesia Post Evaluation    Patient: Taty Kenney    Procedure(s) Performed: Procedure(s) (LRB):  ORIF, ANKLE (Left)    Final Anesthesia Type: general    Patient location during evaluation: PACU  Patient participation: Yes- Able to Participate  Level of consciousness: awake and alert and oriented  Post-procedure vital signs: reviewed and stable  Pain management: adequate  Airway patency: patent    PONV status at discharge: No PONV  Anesthetic complications: no      Cardiovascular status: blood pressure returned to baseline and hemodynamically stable  Respiratory status: unassisted, spontaneous ventilation and room air  Hydration status: euvolemic  Follow-up not needed.          Vitals Value Taken Time   /62 07/07/20 1614   Temp 36.1 °C (97 °F) 07/07/20 1600   Pulse 106 07/07/20 1618   Resp 38 07/07/20 1617   SpO2 93 % 07/07/20 1617   Vitals shown include unvalidated device data.      Event Time   Out of Recovery 16:14:30         Pain/Pranav Score: Pain Rating Prior to Med Admin: 5 (7/7/2020  4:04 PM)  Pain Rating Post Med Admin: 5 (7/7/2020  4:12 PM)  Pranav Score: 8 (7/7/2020  3:00 PM)

## 2020-07-07 NOTE — TRANSFER OF CARE
"Anesthesia Transfer of Care Note    Patient: Taty Kenney    Procedure(s) Performed: Procedure(s) (LRB):  ORIF, ANKLE (Left)    Patient location: PACU    Anesthesia Type: general    Transport from OR: Transported from OR on 6-10 L/min O2 by face mask with adequate spontaneous ventilation    Post pain: adequate analgesia    Post assessment: no apparent anesthetic complications and tolerated procedure well    Post vital signs: stable    Level of consciousness: awake and responds to stimulation    Nausea/Vomiting: no nausea/vomiting    Complications: none    Transfer of care protocol was followed      Last vitals:   Visit Vitals  /70   Pulse 74   Temp 36.4 °C (97.5 °F) (Temporal)   Resp 17   Ht 5' 6.5" (1.689 m)   Wt 84.1 kg (185 lb 8.3 oz)   SpO2 96%   Breastfeeding No   BMI 29.49 kg/m²     "

## 2020-07-07 NOTE — ANESTHESIA PROCEDURE NOTES
Peripheral Block    Patient location during procedure: pre-op   Block not for primary anesthetic.  Reason for block: at surgeon's request and post-op pain management   Post-op Pain Location: left lower extremity  Start time: 7/7/2020 11:40 AM  Timeout: 7/7/2020 11:40 AM   End time: 7/7/2020 11:50 AM    Staffing  Authorizing Provider: Toby Doherty MD  Performing Provider: Chloé Bhagat MD    Preanesthetic Checklist  Completed: patient identified, site marked, surgical consent, pre-op evaluation, timeout performed, IV checked, risks and benefits discussed and monitors and equipment checked  Peripheral Block  Patient position: supine  Prep: ChloraPrep and site prepped and draped  Patient monitoring: heart rate, cardiac monitor, continuous pulse ox, continuous capnometry and frequent blood pressure checks  Block type: adductor canal and popliteal  Laterality: left  Injection technique: continuous  Needle  Needle type: Tuohy   Needle gauge: 17 G  Needle length: 3.5 in  Needle localization: anatomical landmarks and ultrasound guidance  Catheter type: spring wound  Catheter size: 19 G  Test dose: lidocaine 1.5% with Epi 1-to-200,000 and negative   -ultrasound image captured on disc.  Assessment  Injection assessment: negative aspiration, negative parasthesia and local visualized surrounding nerve  Paresthesia pain: none  Heart rate change: no  Slow fractionated injection: yes  Additional Notes  Adductor canal: 0.2% ropi 20ml injected perineurally around saphenous nerve.  Popliteal: 0.2% ropi 20ml injected perineurally around sciatic nerve.    VSS.  [facility] RN monitoring vitals throughout procedure.  Patient tolerated procedure well.

## 2020-07-07 NOTE — BRIEF OP NOTE
[facility]  Brief Operative Note    Surgery Date: 7/7/2020     Surgeon(s) and Role:     * Triston Naidu IV, MD - Primary    Assisting Surgeon: None    Pre-op Diagnosis:  Closed bimalleolar fracture of left ankle [S82.842A]    Post-op Diagnosis:  Post-Op Diagnosis Codes:     * Closed trimalleolar fracture of left ankle    Procedure(s) (LRB):  ORIF, ANKLE (Left)    Anesthesia: General    Description of the findings of the procedure(s): see operative report    Estimated Blood Loss: *minimal*         Specimens:   Specimen (12h ago, onward)    None            Discharge Note    OUTCOME: Patient tolerated treatment/procedure well without complication and is now ready for discharge.    DISPOSITION: Home or Self Care    FINAL DIAGNOSIS:  Same as above    FOLLOWUP: In clinic    DISCHARGE INSTRUCTIONS:    Discharge Procedure Orders   Diet Adult Regular     Keep surgical extremity elevated     Ice to affected area     Leave dressing on - Keep it clean, dry, and intact until clinic visit     Notify your health care provider if you experience any of the following:  temperature >100.4     Weight bearing restrictions (specify):   Order Comments: IRVING RUSH, crutches

## 2020-07-15 ENCOUNTER — HOSPITAL ENCOUNTER (OUTPATIENT)
Dept: RADIOLOGY | Facility: HOSPITAL | Age: 60
Discharge: HOME | End: 2020-07-15
Attending: ORTHOPAEDIC SURGERY
Payer: MEDICAID

## 2020-07-15 DIAGNOSIS — M25.372 ANKLE INSTABILITY, LEFT: ICD-10-CM

## 2020-07-15 DIAGNOSIS — S82.842A CLOSED BIMALLEOLAR FRACTURE OF LEFT ANKLE, INITIAL ENCOUNTER: Primary | ICD-10-CM

## 2020-07-15 DIAGNOSIS — S82.842A CLOSED BIMALLEOLAR FRACTURE OF LEFT ANKLE, INITIAL ENCOUNTER: ICD-10-CM

## 2020-07-15 PROCEDURE — 73610 X-RAY EXAM OF ANKLE: CPT | Mod: 26,LT,, | Performed by: RADIOLOGY

## 2020-07-15 PROCEDURE — 73610 X-RAY EXAM OF ANKLE: CPT | Mod: TC,FY,LT

## 2020-07-15 PROCEDURE — 73610 XR ANKLE COMPLETE 3 VIEW LEFT: ICD-10-PCS | Mod: 26,LT,, | Performed by: RADIOLOGY

## 2020-07-22 DIAGNOSIS — T81.42XA DEEP POSTOPERATIVE WOUND INFECTION: Primary | ICD-10-CM

## 2020-07-22 DIAGNOSIS — S82.852A CLOSED TRIMALLEOLAR FRACTURE OF LEFT ANKLE, INITIAL ENCOUNTER: ICD-10-CM

## 2020-07-23 ENCOUNTER — ANESTHESIA EVENT (OUTPATIENT)
Dept: SURGERY | Facility: HOSPITAL | Age: 60
DRG: 858 | End: 2020-07-23
Payer: MEDICAID

## 2020-07-23 NOTE — H&P
Reason For Visit  Left ankle infection in post operative period     History of Present Illness  60-year-old female returns now 2 weeks status post open reduction internal fixation trimalleolar ankle fracture with closed treatment of the posterior malleolar component (7/7/20). I saw the patient last week on July 15 for a normal postoperative visit and I had concern for wound infection. Therefore I put the patient on oral antibiotics (Keflex). She did  the antibiotics as instructed and has been taking them. She returns today for a wound check. She denies any pain. She states she has been trying not to put any weight on her operative extremity. She denies any fevers or chills.      Allergies   · Augmentin    Current Meds    Medication Name Instruction   Cephalexin 500 MG Oral Capsule (Keflex) TAKE 1 CAPSULE 3 TIMES DAILY UNTIL GONE.   Coreg 6.25 MG Oral Tablet (Carvedilol) TAKE 1 TABLET BY MOUTH TWICE DAILY   HYDROcodone-Acetaminophen 7.5-325 MG Oral Tablet (Norco) TAKE 1 TABLET EVERY 6 HOURS AS NEEDED FOR PAIN.   PROzac 20 MG Oral Capsule (FLUoxetine HCl) TAKE 3 CAPSULE DAILY   Xanax 0.5 MG Oral Tablet (ALPRAZolam) TAKE ONE TABLET BY MOUTH AT BEDTIME   Zantac TABS (RaNITidine HCl) TAKE 1 TABLET BY MOUTH AT BEDTIME FOR STOMACH     Active Problems   · Ankle instability, left (M25.372)   · Anxiety (F41.9)   · Displaced trimalleolar fracture of left lower leg, subsequent encounter for closed fracture  with routine healing (S82.852D)   · Hypertension (I10)   · Vertigo (R42)    Social History   · Never a smoker    Review of Systems  Review of systems negative for fevers, chills, constitutional symptoms. Negative for left ankle pain.      Results/Data    No new images taken today.     Vitals   Recorded: [id number] 02:14PM   Height 5 ft 6 in   Systolic 108, LUE, Sitting   Diastolic 72, LUE, Sitting   Temperature 98 F, Oral   Heart Rate 77, L Brachial Artery   Pulse Quality Normal, L Brachial Artery   Respiration 18    Respiration Quality Normal   O2 Saturation 97, RA   Pain Scale 7   Location: left leg     Physical Exam  Vital signs are noted in the chart above.     The patient appears generally well.    The patient is oriented x 3.    Mood and affect are appropriate and normal.     Gait is nonweightbearing on the left lower extremity.    The patient's splint and dressings are removed from the ankle for examination. There is purulence, erythema, and foul smell discharge at the middle of the lateral surgical incision. The medial surgical incision site also has an area of purulent drainage and erythema at the middle of the surgical incision. These areas are tender to palpation. No hardware is visible. The sutures are still intact. The digits are warm and well-perfused otherwise.      Assessment   1. Infection following a procedure, deep incisional surgical site, initial encounter (T81.42XA)   · The patient has an infection at her surgical site over the medial and lateral aspects of      her ankle. I have informed the patient that this requires return to the operating room for      irrigation and debridement. Depending on the depth and extent of the infection, I will      either washout the wound and close it primarily and leave the hardware in place or      remove the hardware after thorough irrigation and debridement then placed an external      fixation device to retain ankle reduction. The patient will also likely require admission      to the hospital for PICC line placement and institution of IV antibiotics. I have      instructed the patient to discontinue use of the oral antibiotic she was taking at home to      plan for taking cultures intraoperatively. Patient agrees to proceed with this plan and      understands this is going to be a lengthy recovery that will take her outside of      the normal recovery after ankle surgery. Patient's daughter is here today to also receive      this information and both agree to  proceed.   2. Displaced trimalleolar fracture of left lower leg, subsequent encounter for closed fracture   with routine healing (K22.981S)    Plan  1. Plan for return to operating room for irrigation and debridement of left infected surgical site at the ankle  2. Plan for PICC line placement as an inpatient then follow-up as an outpatient  3. Tylenol 650 mg 3 times daily as needed for pain  4. Instructed to discontinue oral antibiotics

## 2020-07-24 ENCOUNTER — HOSPITAL ENCOUNTER (INPATIENT)
Facility: HOSPITAL | Age: 60
LOS: 5 days | Discharge: HOME HEALTH | DRG: 858 | End: 2020-07-29
Attending: ORTHOPAEDIC SURGERY | Admitting: ORTHOPAEDIC SURGERY
Payer: MEDICAID

## 2020-07-24 ENCOUNTER — ANESTHESIA (OUTPATIENT)
Dept: SURGERY | Facility: HOSPITAL | Age: 60
DRG: 858 | End: 2020-07-24
Payer: MEDICAID

## 2020-07-24 DIAGNOSIS — S82.852G CLOSED TRIMALLEOLAR FRACTURE OF LEFT ANKLE WITH DELAYED HEALING, SUBSEQUENT ENCOUNTER: Primary | ICD-10-CM

## 2020-07-24 DIAGNOSIS — T81.42XA DEEP POSTOPERATIVE WOUND INFECTION: ICD-10-CM

## 2020-07-24 DIAGNOSIS — A49.8 INFECTION DUE TO ENTEROBACTER CLOACAE: ICD-10-CM

## 2020-07-24 DIAGNOSIS — A49.02 MRSA (METHICILLIN RESISTANT STAPHYLOCOCCUS AUREUS) INFECTION: ICD-10-CM

## 2020-07-24 LAB
BASOPHILS # BLD AUTO: 0.03 K/UL (ref 0–0.2)
BASOPHILS NFR BLD: 0.2 % (ref 0–1.9)
CREAT SERPL-MCNC: 0.8 MG/DL (ref 0.5–1.4)
DIFFERENTIAL METHOD: ABNORMAL
EOSINOPHIL # BLD AUTO: 0.1 K/UL (ref 0–0.5)
EOSINOPHIL NFR BLD: 0.5 % (ref 0–8)
ERYTHROCYTE [DISTWIDTH] IN BLOOD BY AUTOMATED COUNT: 13.3 % (ref 11.5–14.5)
EST. GFR  (AFRICAN AMERICAN): >60 ML/MIN/1.73 M^2
EST. GFR  (NON AFRICAN AMERICAN): >60 ML/MIN/1.73 M^2
GRAM STN SPEC: NORMAL
HCT VFR BLD AUTO: 35.2 % (ref 37–48.5)
HGB BLD-MCNC: 11.5 G/DL (ref 12–16)
IMM GRANULOCYTES # BLD AUTO: 0.08 K/UL (ref 0–0.04)
IMM GRANULOCYTES NFR BLD AUTO: 0.6 % (ref 0–0.5)
LYMPHOCYTES # BLD AUTO: 0.9 K/UL (ref 1–4.8)
LYMPHOCYTES NFR BLD: 7.5 % (ref 18–48)
MCH RBC QN AUTO: 31.2 PG (ref 27–31)
MCHC RBC AUTO-ENTMCNC: 32.7 G/DL (ref 32–36)
MCV RBC AUTO: 95 FL (ref 82–98)
MONOCYTES # BLD AUTO: 0.2 K/UL (ref 0.3–1)
MONOCYTES NFR BLD: 1.2 % (ref 4–15)
NEUTROPHILS # BLD AUTO: 11.3 K/UL (ref 1.8–7.7)
NEUTROPHILS NFR BLD: 90 % (ref 38–73)
NRBC BLD-RTO: 0 /100 WBC
PLATELET # BLD AUTO: 290 K/UL (ref 150–350)
PMV BLD AUTO: 10.9 FL (ref 9.2–12.9)
POCT GLUCOSE: 98 MG/DL (ref 70–110)
RBC # BLD AUTO: 3.69 M/UL (ref 4–5.4)
SARS-COV-2 RDRP RESP QL NAA+PROBE: NEGATIVE
WBC # BLD AUTO: 12.59 K/UL (ref 3.9–12.7)

## 2020-07-24 PROCEDURE — 87206 SMEAR FLUORESCENT/ACID STAI: CPT | Mod: 91

## 2020-07-24 PROCEDURE — 36569 INSJ PICC 5 YR+ W/O IMAGING: CPT

## 2020-07-24 PROCEDURE — 87015 SPECIMEN INFECT AGNT CONCNTJ: CPT

## 2020-07-24 PROCEDURE — 87070 CULTURE OTHR SPECIMN AEROBIC: CPT | Mod: 59

## 2020-07-24 PROCEDURE — 63600175 PHARM REV CODE 636 W HCPCS: Performed by: ORTHOPAEDIC SURGERY

## 2020-07-24 PROCEDURE — 11000001 HC ACUTE MED/SURG PRIVATE ROOM

## 2020-07-24 PROCEDURE — 27201423 OPTIME MED/SURG SUP & DEVICES STERILE SUPPLY: Performed by: ORTHOPAEDIC SURGERY

## 2020-07-24 PROCEDURE — 37000008 HC ANESTHESIA 1ST 15 MINUTES: Performed by: ORTHOPAEDIC SURGERY

## 2020-07-24 PROCEDURE — 71000033 HC RECOVERY, INTIAL HOUR: Performed by: ORTHOPAEDIC SURGERY

## 2020-07-24 PROCEDURE — 87075 CULTR BACTERIA EXCEPT BLOOD: CPT | Mod: 59

## 2020-07-24 PROCEDURE — 36000706: Performed by: ORTHOPAEDIC SURGERY

## 2020-07-24 PROCEDURE — 25000003 PHARM REV CODE 250: Performed by: ORTHOPAEDIC SURGERY

## 2020-07-24 PROCEDURE — 94799 UNLISTED PULMONARY SVC/PX: CPT

## 2020-07-24 PROCEDURE — 99900035 HC TECH TIME PER 15 MIN (STAT)

## 2020-07-24 PROCEDURE — A4216 STERILE WATER/SALINE, 10 ML: HCPCS | Performed by: ORTHOPAEDIC SURGERY

## 2020-07-24 PROCEDURE — 87176 TISSUE HOMOGENIZATION CULTR: CPT

## 2020-07-24 PROCEDURE — 37000009 HC ANESTHESIA EA ADD 15 MINS: Performed by: ORTHOPAEDIC SURGERY

## 2020-07-24 PROCEDURE — 82565 ASSAY OF CREATININE: CPT

## 2020-07-24 PROCEDURE — 36415 COLL VENOUS BLD VENIPUNCTURE: CPT

## 2020-07-24 PROCEDURE — 87205 SMEAR GRAM STAIN: CPT | Mod: 59

## 2020-07-24 PROCEDURE — 87102 FUNGUS ISOLATION CULTURE: CPT | Mod: 59

## 2020-07-24 PROCEDURE — 85025 COMPLETE CBC W/AUTO DIFF WBC: CPT

## 2020-07-24 PROCEDURE — 87077 CULTURE AEROBIC IDENTIFY: CPT

## 2020-07-24 PROCEDURE — 71000039 HC RECOVERY, EACH ADD'L HOUR: Performed by: ORTHOPAEDIC SURGERY

## 2020-07-24 PROCEDURE — U0002 COVID-19 LAB TEST NON-CDC: HCPCS

## 2020-07-24 PROCEDURE — 94761 N-INVAS EAR/PLS OXIMETRY MLT: CPT

## 2020-07-24 PROCEDURE — 36000707: Performed by: ORTHOPAEDIC SURGERY

## 2020-07-24 PROCEDURE — C1751 CATH, INF, PER/CENT/MIDLINE: HCPCS

## 2020-07-24 PROCEDURE — 87186 SC STD MICRODIL/AGAR DIL: CPT | Mod: 59

## 2020-07-24 PROCEDURE — 87116 MYCOBACTERIA CULTURE: CPT | Mod: 59

## 2020-07-24 RX ORDER — FAMOTIDINE 20 MG/1
20 TABLET, FILM COATED ORAL 2 TIMES DAILY
Status: DISCONTINUED | OUTPATIENT
Start: 2020-07-24 | End: 2020-07-27 | Stop reason: SDUPTHER

## 2020-07-24 RX ORDER — VASOPRESSIN 20 [USP'U]/ML
INJECTION, SOLUTION INTRAMUSCULAR; SUBCUTANEOUS
Status: DISCONTINUED | OUTPATIENT
Start: 2020-07-24 | End: 2020-07-24

## 2020-07-24 RX ORDER — KETOROLAC TROMETHAMINE 30 MG/ML
INJECTION, SOLUTION INTRAMUSCULAR; INTRAVENOUS
Status: DISCONTINUED | OUTPATIENT
Start: 2020-07-24 | End: 2020-07-24

## 2020-07-24 RX ORDER — SODIUM CHLORIDE 0.9 % (FLUSH) 0.9 %
10 SYRINGE (ML) INJECTION EVERY 6 HOURS
Status: DISCONTINUED | OUTPATIENT
Start: 2020-07-24 | End: 2020-07-29 | Stop reason: HOSPADM

## 2020-07-24 RX ORDER — METRONIDAZOLE 500 MG/1
500 TABLET ORAL EVERY 8 HOURS
Status: CANCELLED | OUTPATIENT
Start: 2020-07-24

## 2020-07-24 RX ORDER — MIDAZOLAM HYDROCHLORIDE 1 MG/ML
INJECTION, SOLUTION INTRAMUSCULAR; INTRAVENOUS
Status: DISCONTINUED | OUTPATIENT
Start: 2020-07-24 | End: 2020-07-24

## 2020-07-24 RX ORDER — SODIUM CHLORIDE, SODIUM LACTATE, POTASSIUM CHLORIDE, CALCIUM CHLORIDE 600; 310; 30; 20 MG/100ML; MG/100ML; MG/100ML; MG/100ML
INJECTION, SOLUTION INTRAVENOUS CONTINUOUS PRN
Status: DISCONTINUED | OUTPATIENT
Start: 2020-07-24 | End: 2020-07-24

## 2020-07-24 RX ORDER — EPHEDRINE SULFATE 50 MG/ML
INJECTION, SOLUTION INTRAVENOUS
Status: DISCONTINUED | OUTPATIENT
Start: 2020-07-24 | End: 2020-07-24

## 2020-07-24 RX ORDER — ONDANSETRON 2 MG/ML
4 INJECTION INTRAMUSCULAR; INTRAVENOUS DAILY PRN
Status: DISCONTINUED | OUTPATIENT
Start: 2020-07-24 | End: 2020-07-24 | Stop reason: HOSPADM

## 2020-07-24 RX ORDER — HYDROMORPHONE HYDROCHLORIDE 2 MG/ML
0.5 INJECTION, SOLUTION INTRAMUSCULAR; INTRAVENOUS; SUBCUTANEOUS EVERY 30 MIN PRN
Status: DISCONTINUED | OUTPATIENT
Start: 2020-07-24 | End: 2020-07-24

## 2020-07-24 RX ORDER — HYDROMORPHONE HYDROCHLORIDE 2 MG/ML
0.2 INJECTION, SOLUTION INTRAMUSCULAR; INTRAVENOUS; SUBCUTANEOUS EVERY 5 MIN PRN
Status: DISCONTINUED | OUTPATIENT
Start: 2020-07-24 | End: 2020-07-24 | Stop reason: HOSPADM

## 2020-07-24 RX ORDER — SODIUM CHLORIDE 0.9 % (FLUSH) 0.9 %
10 SYRINGE (ML) INJECTION
Status: DISCONTINUED | OUTPATIENT
Start: 2020-07-24 | End: 2020-07-29 | Stop reason: HOSPADM

## 2020-07-24 RX ORDER — OXYCODONE AND ACETAMINOPHEN 5; 325 MG/1; MG/1
1 TABLET ORAL EVERY 6 HOURS
Status: DISCONTINUED | OUTPATIENT
Start: 2020-07-24 | End: 2020-07-29 | Stop reason: HOSPADM

## 2020-07-24 RX ORDER — MUPIROCIN 20 MG/G
OINTMENT TOPICAL
Status: DISCONTINUED | OUTPATIENT
Start: 2020-07-24 | End: 2020-07-24

## 2020-07-24 RX ORDER — CEFEPIME HYDROCHLORIDE 1 G/50ML
2 INJECTION, SOLUTION INTRAVENOUS
Status: CANCELLED | OUTPATIENT
Start: 2020-07-24

## 2020-07-24 RX ORDER — SODIUM CHLORIDE 0.9 % (FLUSH) 0.9 %
2 SYRINGE (ML) INJECTION EVERY 6 HOURS
Status: DISCONTINUED | OUTPATIENT
Start: 2020-07-24 | End: 2020-07-24

## 2020-07-24 RX ORDER — OXYCODONE HYDROCHLORIDE 5 MG/1
5 TABLET ORAL
Status: DISCONTINUED | OUTPATIENT
Start: 2020-07-24 | End: 2020-07-24 | Stop reason: HOSPADM

## 2020-07-24 RX ORDER — LIDOCAINE HCL/PF 100 MG/5ML
SYRINGE (ML) INTRAVENOUS
Status: DISCONTINUED | OUTPATIENT
Start: 2020-07-24 | End: 2020-07-24

## 2020-07-24 RX ORDER — DEXAMETHASONE SODIUM PHOSPHATE 4 MG/ML
INJECTION, SOLUTION INTRA-ARTICULAR; INTRALESIONAL; INTRAMUSCULAR; INTRAVENOUS; SOFT TISSUE
Status: DISCONTINUED | OUTPATIENT
Start: 2020-07-24 | End: 2020-07-24

## 2020-07-24 RX ORDER — PROPOFOL 10 MG/ML
VIAL (ML) INTRAVENOUS
Status: DISCONTINUED | OUTPATIENT
Start: 2020-07-24 | End: 2020-07-24

## 2020-07-24 RX ORDER — ASPIRIN 81 MG/1
81 TABLET ORAL DAILY
Status: DISCONTINUED | OUTPATIENT
Start: 2020-07-24 | End: 2020-07-29 | Stop reason: HOSPADM

## 2020-07-24 RX ORDER — PHENYLEPHRINE HYDROCHLORIDE 10 MG/ML
INJECTION INTRAVENOUS
Status: DISCONTINUED | OUTPATIENT
Start: 2020-07-24 | End: 2020-07-24

## 2020-07-24 RX ORDER — PROPRANOLOL HYDROCHLORIDE 10 MG/1
20 TABLET ORAL 2 TIMES DAILY
Status: DISCONTINUED | OUTPATIENT
Start: 2020-07-24 | End: 2020-07-29 | Stop reason: HOSPADM

## 2020-07-24 RX ORDER — ONDANSETRON 2 MG/ML
INJECTION INTRAMUSCULAR; INTRAVENOUS
Status: DISCONTINUED | OUTPATIENT
Start: 2020-07-24 | End: 2020-07-24

## 2020-07-24 RX ORDER — FENTANYL CITRATE 50 UG/ML
INJECTION, SOLUTION INTRAMUSCULAR; INTRAVENOUS
Status: DISCONTINUED | OUTPATIENT
Start: 2020-07-24 | End: 2020-07-24

## 2020-07-24 RX ORDER — ONDANSETRON 2 MG/ML
4 INJECTION INTRAMUSCULAR; INTRAVENOUS 2 TIMES DAILY PRN
Status: DISCONTINUED | OUTPATIENT
Start: 2020-07-24 | End: 2020-07-29 | Stop reason: HOSPADM

## 2020-07-24 RX ORDER — HYDROMORPHONE HYDROCHLORIDE 2 MG/ML
1 INJECTION, SOLUTION INTRAMUSCULAR; INTRAVENOUS; SUBCUTANEOUS EVERY 8 HOURS
Status: DISCONTINUED | OUTPATIENT
Start: 2020-07-24 | End: 2020-07-27

## 2020-07-24 RX ORDER — FLUOXETINE HYDROCHLORIDE 20 MG/1
60 CAPSULE ORAL DAILY
Status: DISCONTINUED | OUTPATIENT
Start: 2020-07-24 | End: 2020-07-29 | Stop reason: HOSPADM

## 2020-07-24 RX ADMIN — FAMOTIDINE 20 MG: 20 TABLET ORAL at 09:07

## 2020-07-24 RX ADMIN — Medication 10 ML: at 11:07

## 2020-07-24 RX ADMIN — PHENYLEPHRINE HYDROCHLORIDE 50 MCG: 10 INJECTION INTRAVENOUS at 07:07

## 2020-07-24 RX ADMIN — PROPOFOL 50 MG: 10 INJECTION, EMULSION INTRAVENOUS at 07:07

## 2020-07-24 RX ADMIN — PROPOFOL 200 MG: 10 INJECTION, EMULSION INTRAVENOUS at 07:07

## 2020-07-24 RX ADMIN — HYDROMORPHONE HYDROCHLORIDE 1 MG: 2 INJECTION, SOLUTION INTRAMUSCULAR; INTRAVENOUS; SUBCUTANEOUS at 09:07

## 2020-07-24 RX ADMIN — FENTANYL CITRATE 50 MCG: 50 INJECTION, SOLUTION INTRAMUSCULAR; INTRAVENOUS at 08:07

## 2020-07-24 RX ADMIN — KETOROLAC TROMETHAMINE 30 MG: 30 INJECTION, SOLUTION INTRAMUSCULAR; INTRAVENOUS at 08:07

## 2020-07-24 RX ADMIN — ONDANSETRON 4 MG: 2 INJECTION, SOLUTION INTRAMUSCULAR; INTRAVENOUS at 08:07

## 2020-07-24 RX ADMIN — HYDROMORPHONE HYDROCHLORIDE 0.5 MG: 2 INJECTION INTRAMUSCULAR; INTRAVENOUS; SUBCUTANEOUS at 08:07

## 2020-07-24 RX ADMIN — PHENYLEPHRINE HYDROCHLORIDE 100 MCG: 10 INJECTION INTRAVENOUS at 07:07

## 2020-07-24 RX ADMIN — DEXAMETHASONE SODIUM PHOSPHATE 4 MG: 4 INJECTION, SOLUTION INTRA-ARTICULAR; INTRALESIONAL; INTRAMUSCULAR; INTRAVENOUS; SOFT TISSUE at 07:07

## 2020-07-24 RX ADMIN — EPHEDRINE SULFATE 15 MG: 50 INJECTION, SOLUTION INTRAMUSCULAR; INTRAVENOUS; SUBCUTANEOUS at 08:07

## 2020-07-24 RX ADMIN — PHENYLEPHRINE HYDROCHLORIDE 100 MCG: 10 INJECTION INTRAVENOUS at 08:07

## 2020-07-24 RX ADMIN — MIDAZOLAM 2 MG: 1 INJECTION INTRAMUSCULAR; INTRAVENOUS at 07:07

## 2020-07-24 RX ADMIN — VASOPRESSIN 2 UNITS: 20 INJECTION, SOLUTION INTRAMUSCULAR; SUBCUTANEOUS at 08:07

## 2020-07-24 RX ADMIN — ASPIRIN 81 MG: 81 TABLET, COATED ORAL at 10:07

## 2020-07-24 RX ADMIN — VANCOMYCIN HYDROCHLORIDE 2000 MG: 100 INJECTION, POWDER, LYOPHILIZED, FOR SOLUTION INTRAVENOUS at 12:07

## 2020-07-24 RX ADMIN — OXYCODONE HYDROCHLORIDE AND ACETAMINOPHEN 1 TABLET: 5; 325 TABLET ORAL at 05:07

## 2020-07-24 RX ADMIN — EPHEDRINE SULFATE 10 MG: 50 INJECTION, SOLUTION INTRAMUSCULAR; INTRAVENOUS; SUBCUTANEOUS at 08:07

## 2020-07-24 RX ADMIN — SODIUM CHLORIDE, SODIUM LACTATE, POTASSIUM CHLORIDE, AND CALCIUM CHLORIDE: .6; .31; .03; .02 INJECTION, SOLUTION INTRAVENOUS at 07:07

## 2020-07-24 RX ADMIN — OXYCODONE HYDROCHLORIDE AND ACETAMINOPHEN 1 TABLET: 5; 325 TABLET ORAL at 11:07

## 2020-07-24 RX ADMIN — Medication 10 ML: at 06:07

## 2020-07-24 RX ADMIN — PROPRANOLOL HYDROCHLORIDE 20 MG: 10 TABLET ORAL at 09:07

## 2020-07-24 RX ADMIN — SODIUM CHLORIDE 2 G: 9 INJECTION, SOLUTION INTRAVENOUS at 07:07

## 2020-07-24 RX ADMIN — PHENYLEPHRINE HYDROCHLORIDE 150 MCG: 10 INJECTION INTRAVENOUS at 07:07

## 2020-07-24 RX ADMIN — FENTANYL CITRATE 50 MCG: 50 INJECTION, SOLUTION INTRAMUSCULAR; INTRAVENOUS at 07:07

## 2020-07-24 RX ADMIN — LIDOCAINE HYDROCHLORIDE 100 MG: 20 INJECTION, SOLUTION INTRAVENOUS at 07:07

## 2020-07-24 RX ADMIN — HYDROMORPHONE HYDROCHLORIDE 0.5 MG: 2 INJECTION INTRAMUSCULAR; INTRAVENOUS; SUBCUTANEOUS at 09:07

## 2020-07-24 NOTE — BRIEF OP NOTE
[facility] Brief Operative Note      Patient information:  Taty Kenney      Date of Surgery:  7/24/20    Pre-op Diagnosis:   1. L ankle trimalleolar fracture deep wound infection       Post-op Diagnosis:   1. Same as above    Procedure(s):   1. L ankle irrigation and debridement    Anesthesia: General    Staff Attending/Surgeon: Dr. Triston Naidu MD , present and scrubbed during all critical portions of the case.    Assistant Surgeon(s):   1. Dr Maria Elena Johnson DO  2. Dr Carlos Ibrahim MD    Estimated Blood Loss: less than 50 mL           Drain: wound vac LLE            Total IV Fluids: see full anesthesia report           Specimens: tissue cultures ankle wound Left    Implants: none    Complications: none    Findings: see full op report    Tourniquet time: none           Disposition: awakened from anesthesia, extubated and taken to the recovery room in a stable condition, having suffered no apparent untoward event.           Condition: doing well without problems      Technique: see full op report    Dr. Maria Elena Johnson, PGY3  [facility]  Pager: [phone]

## 2020-07-24 NOTE — CONSULTS
[facility] Consult Note    Primary Attending Physician: Triston Naidu IV, MD  Consultant Attending: Hui Tian MD  Consultant Fellow: Josh Snyder MD    Reason for Consult:     1. Deep postoperative wound infection      Assessment:     Taty Kenney is a 60 y.o. female with:  Patient Active Problem List    Diagnosis Date Noted    Deep postoperative wound infection 07/24/2020        Plan:     1. Deep postoperative wound infection  59 yo woman s/p I&D after developing deep wound infection following ORIF for trimalleolar fracture. Hardware retained.   - Would continue vancomycin only at this time. Patient with no history of DM, had closed fracture in an indoor environment with no water/other environmental exposures to surgical wound to warrant broader coverage at this time.   - Will follow cultures over the next 48-72 hours until they finalize and final abx recs can be made.   - Follow CBC, BMP daily while inpatient.   - Would also recommend Tdap vaccine if patient is not UTD (none in [facility] system and she is unsure of last vaccination).    Thank you for allowing us to participate in the care of this patient. Please contact me if you have any questions regarding this consult.    Josh Snyder MD  [facility], PGY-4  Cell: [phone]    Subjective:      History of Present Illness:  Taty Kenney is a 60 y.o. female who  has a past medical history of Anxiety, Depression, and Hypertension.. The patient presented to the [facility] on 7/24/2020 with a primary complaint of wound infection.    Mrs. Kenney is 2 weeks post-op from ORIF of trimalleolar ankle fracture (7/7/20). When patient was seen for post-op follow up in Ortho clinic there was erythema and tenderness at the incision site concerning for infection. She was prescribed Keflex with no improvement and some drainage from the wound noted on follow up the next week. Went to OR for I&D,  debridement on 7/24. ID consulted for antibiotic recommendations.     Past Medical History:  Past Medical History:   Diagnosis Date    Anxiety     Depression     Hypertension        Past Surgical History:  Past Surgical History:   Procedure Laterality Date    HYSTERECTOMY      OPEN REDUCTION AND INTERNAL FIXATION (ORIF) OF INJURY OF ANKLE Left 7/7/2020    Procedure: ORIF, ANKLE;  Surgeon: Triston aNidu IV, MD;  Location: [facility];  Service: Orthopedics;  Laterality: Left;  GENERAL + REGIONAL  ORTHO MAJOR TRAY  BONE FOAM RAMP  FLUOROSCOPY  [place] - Mohamud Lopez confirmed CW 7/6       Allergies:  Review of patient's allergies indicates:   Allergen Reactions    Augmentin [amoxicillin-pot clavulanate] Other (See Comments)     Stomach cramps       Medications:   In-Hospital Scheduled Medications:   aspirin  81 mg Oral Daily    famotidine  20 mg Oral BID    FLUoxetine  60 mg Oral Daily    HYDROmorphone  1 mg Intravenous Q8H    oxyCODONE-acetaminophen  1 tablet Oral Q6H    propranoloL  20 mg Oral BID    sodium chloride 0.9%  10 mL Intravenous Q6H    [START ON 7/25/2020] vancomycin (VANCOCIN) IVPB  1,250 mg Intravenous Q12H    vancomycin (VANCOCIN) IVPB  2,000 mg Intravenous Once      In-Hospital PRN Medications:  ondansetron, Flushing PICC Protocol **AND** sodium chloride 0.9% **AND** sodium chloride 0.9%, Pharmacy to dose Vancomycin consult **AND** vancomycin - pharmacy to dose   In-Hospital IV Infusion Medications:     Home Medications:  Prior to Admission medications    Medication Sig Start Date End Date Taking? Authorizing Provider   alprazolam (XANAX) 0.5 MG tablet Take 0.5 mg by mouth 3 (three) times daily.   Yes Historical Provider, MD   aspirin (ECOTRIN) 81 MG EC tablet Take 1 tablet (81 mg total) by mouth once daily. 7/7/20 7/7/21 Yes Triston Naidu IV, MD   FLUoxetine 20 MG capsule Take 60 mg by mouth once daily.   Yes Historical Provider, MD   meclizine (ANTIVERT) 32 MG tablet Take 32 mg by mouth 3  "(three) times daily as needed.   Yes Historical Provider, MD   propranoloL (INDERAL) 40 MG tablet Take 20 mg by mouth 2 (two) times daily.    Yes Historical Provider, MD   QUEtiapine (SEROQUEL XR) 300 MG Tb24 Take 300 mg by mouth every evening.   Yes Historical Provider, MD   traZODone (DESYREL) 50 MG tablet Take 100 mg by mouth nightly as needed for Insomnia.   Yes Historical Provider, MD   FLUoxetine 10 MG capsule Take 30 mg by mouth once daily.    Historical Provider, MD       Family History:  Family History   Problem Relation Age of Onset    Hypertension Mother     Hyperlipidemia Mother     Alcohol abuse Mother     Cancer Father        Social History:  Social History     Tobacco Use    Smoking status: Former Smoker   Substance Use Topics    Alcohol use: No    Drug use: No       Review of Systems   Constitutional: Negative for fever and malaise/fatigue.   HENT: Negative for sore throat.    Eyes: Negative for photophobia.   Respiratory: Negative for shortness of breath.    Cardiovascular: Negative for chest pain and leg swelling.   Gastrointestinal: Negative for abdominal pain, blood in stool, diarrhea and nausea.   Genitourinary: Negative for dysuria.   Musculoskeletal: Positive for joint pain. Negative for back pain, myalgias and neck pain.   Neurological: Negative for headaches.   Psychiatric/Behavioral: Negative for depression.          Objective:   Last 24 Hour Vital Signs:  BP  Min: 107/76  Max: 159/77  Temp  Av.5 °F (36.4 °C)  Min: 97 °F (36.1 °C)  Max: 97.8 °F (36.6 °C)  Pulse  Av.9  Min: 64  Max: 80  Resp  Avg: 15  Min: 10  Max: 18  SpO2  Av.6 %  Min: 93 %  Max: 98 %  Height  Av' 6" (167.6 cm)  Min: 5' 6" (167.6 cm)  Max: 5' 6" (167.6 cm)  Weight  Av.1 kg (187 lb 10.6 oz)  Min: 81.6 kg (180 lb)  Max: 88.6 kg (195 lb 5.2 oz)  No intake/output data recorded.    Physical Exam  Constitutional:       General: She is not in acute distress.     Appearance: Normal appearance. She is " not ill-appearing or diaphoretic.   HENT:      Head: Normocephalic and atraumatic.      Nose: Nose normal.      Mouth/Throat:      Mouth: Mucous membranes are moist.      Pharynx: Oropharynx is clear. No oropharyngeal exudate or posterior oropharyngeal erythema.   Eyes:      General: No scleral icterus.     Conjunctiva/sclera: Conjunctivae normal.   Neck:      Musculoskeletal: Normal range of motion and neck supple. No neck rigidity.   Cardiovascular:      Rate and Rhythm: Normal rate and regular rhythm.      Heart sounds: Normal heart sounds.   Pulmonary:      Effort: Pulmonary effort is normal.      Breath sounds: Normal breath sounds.   Abdominal:      General: There is no distension.      Palpations: Abdomen is soft.      Tenderness: There is no abdominal tenderness.   Musculoskeletal:      Comments: Left leg in post-surgical dressing.   Skin:     Coloration: Skin is not jaundiced.      Findings: No lesion or rash.   Neurological:      General: No focal deficit present.      Mental Status: She is alert and oriented to person, place, and time.   Psychiatric:         Mood and Affect: Mood normal.         Behavior: Behavior normal.         Laboratory Results:  Most Recent Data:  CBC:   Lab Results   Component Value Date    WBC 12.59 07/24/2020    HGB 11.5 (L) 07/24/2020    HCT 35.2 (L) 07/24/2020     07/24/2020    MCV 95 07/24/2020    RDW 13.3 07/24/2020     BMP:   Lab Results   Component Value Date     (L) 07/06/2020    K 4.5 07/06/2020     07/06/2020    CO2 21 (L) 07/06/2020    BUN 21 07/06/2020    GLU 91 07/06/2020    CALCIUM 10.2 07/06/2020     LFTs:   Lab Results   Component Value Date    PROT 7.7 08/31/2018    ALBUMIN 3.9 08/31/2018    BILITOT 0.3 08/31/2018    AST 52 (H) 08/31/2018    ALKPHOS 100 08/31/2018    ALT 57 (H) 08/31/2018     Coags:   Lab Results   Component Value Date    INR 1.0 07/06/2020     FLP: No results found for: CHOL, HDL, LDLCALC, TRIG, CHOLHDL  DM:   Lab Results    Component Value Date    CREATININE 0.8 07/24/2020     Thyroid:   Lab Results   Component Value Date    TSH 0.619 08/31/2018     Anemia: No results found for: IRON, TIBC, FERRITIN, [id number], FOLATE  Cardiac:   Lab Results   Component Value Date    TROPONINI 0.007 08/31/2018     Urinalysis:   Lab Results   Component Value Date    COLORU Yellow 07/06/2020    SPECGRAV 1.020 07/06/2020    NITRITE Negative 07/06/2020    KETONESU Negative 07/06/2020    UROBILINOGEN Negative 07/06/2020       Trended Lab Data:  Recent Labs   Lab 07/24/20  1013   WBC 12.59   HGB 11.5*   HCT 35.2*      MCV 95   RDW 13.3       Trended Cardiac Data:  No results for input(s): TROPONINI, CKTOTAL, CKMB, BNP in the last 168 hours.    Microbiology Data:  OR cultures pending    Other Results:    Radiology:  X-ray Chest 1 View For Picc_central Line    Result Date: 7/24/2020  EXAMINATION: XR CHEST 1 VIEW CLINICAL HISTORY: Evaluate PICC line placement; TECHNIQUE: Single frontal view of the chest was performed. COMPARISON: None FINDINGS: Clinical indication is evaluate PICC  placement.  There is no PICC identified entering from the left or right upper extremity.  Patient is slightly rotated.  Lungs show no evidence of significant focal consolidation, large effusion or pneumothorax.  Mild blunting of the left costophrenic angle.  Cardiomediastinal silhouette is within normal limits.  Bones show mild degenerative changes.     PICC is not visualized entering from either the right or left upper extremity.  No acute radiographic findings in the chest. Electronically signed by: Triston Martinez MD Date:    07/24/2020 Time:    10:03    X-ray Ankle Complete 3 View Left    Result Date: 7/15/2020  EXAMINATION: XR ANKLE COMPLETE 3 VIEW LEFT CLINICAL HISTORY: Displaced bimalleolar fracture of left lower leg, initial encounter for closed fracture TECHNIQUE: AP, lateral and oblique views of the left ankle were performed. COMPARISON: 06/29/2020 FINDINGS:  Overlying splint material partially obscures bony detail.  Status post ORIF for distal fibular and tibial fractures with improved anatomic alignment.  Hardware appears intact.  Similar mortise widening of the medial gutter.  Talar dome appears intact.  Soft tissue swelling about the ankle.     As above. Electronically signed by: Vincent Piña Date:    07/15/2020 Time:    15:22    X-ray Ankle Complete 3 View Left    Result Date: 6/29/2020  EXAMINATION: XR ANKLE COMPLETE 3 VIEW LEFT CLINICAL HISTORY: Other instability, left ankle TECHNIQUE: AP, lateral and oblique views of the left ankle were performed. COMPARISON: No similar prior imaging performed at this institution. FINDINGS: Overlying casting material partially obscures bony detail.  There is mildly displaced obliquely oriented fracture through the distal fibular diaphysis.  There is also obliquely oriented, mildly displaced distal tibial diaphyseal fracture superior to the medial malleolus.  Widening of the medial gutter suggestive of ligamentous injury with possible mild widening at the distal tibiofibular articulation on mortise view.  Talar dome appears intact.  Soft tissue swelling about the ankle.     As above. Electronically signed by: Vincent Piña Date:    06/29/2020 Time:    09:44    Surg Fl Surgery Fluoro Usage    Result Date: 7/24/2020  See OP Notes for results. IMPRESSION: See OP Notes for results. This procedure was auto-finalized by: Ana Radiologist    Surg Fl Surgery Fluoro Usage    Result Date: 7/7/2020  See OP Notes for results. IMPRESSION: See OP Notes for results. This procedure was auto-finalized by: Ana Radiologist

## 2020-07-24 NOTE — INTERVAL H&P NOTE
The patient has been examined and the H&P has been reviewed:    I concur with the findings and no changes have occurred since H&P was written.    Anesthesia/Surgery risks, benefits and alternative options discussed and understood by patient/family.          Active Hospital Problems    Diagnosis  POA    Deep postoperative wound infection [T81.42XA]  Yes      Resolved Hospital Problems   No resolved problems to display.

## 2020-07-24 NOTE — PLAN OF CARE
TN met with pt - lives with elderly mother   daughter Shari Kenney       pt has dme - wc, rw (2) and ax. crutches       s/p ankle fx 6/10 - now with infection      will need wound vac at discharge --- [facility] auth form completed by MD --- sent to [facility]    TN spoke with Lucy at [facility]  -- gave her width and depth of wnd per Dr. Johnson -  the wound vac will be delivered to pt in am.    pt will need IV abx at discharge as well.   Per pt - no preference for infusion company    TN spoke with Chava with [employer]   --- referral sent per [facility] [employer]     pt has undergoing teaching per [employer];  weekend [employer] RN:  Erika       Case discussed with Dr. Johnson -- MD will do wound vac dressing changes - will not need apts at wound care clinic.  MD aware that pt will not be able to get HH nurse for wound care due to insurance.       per ID MD at bedside --- cx are still pending. For possible d/c this weekend or Monday.      Case discussed with MD - MD is comfortable with pt discharging to home with no apt at wound care for follow up.  Efforts were made to find HH for this pt with no success (pt has [facility])              07/24/20 1312   Discharge Assessment   Assessment Type Discharge Planning Assessment   Confirmed/corrected address and phone number on facesheet? Yes   Assessment information obtained from? Patient;Medical Record   Expected Length of Stay (days) 3   Communicated expected length of stay with patient/caregiver yes   Prior to hospitilization cognitive status: Alert/Oriented   Prior to hospitalization functional status: Independent   Current cognitive status: Alert/Oriented   Current Functional Status: Independent   Lives With parent(s)   Able to Return to Prior Arrangements yes   Is patient able to care for self after discharge? Yes   Who are your caregiver(s) and their phone number(s)? yudy Kenney  lives nearby and assists as needed.   Patient's perception of  discharge disposition home or selfcare  (home with IV abx)   Readmission Within the Last 30 Days no previous admission in last 30 days   Patient currently being followed by outpatient case management? No   Patient currently receives any other outside agency services? No   Equipment Currently Used at Home wheelchair;walker, rolling;crutches, axillary   Do you have any problems affording any of your prescribed medications? No  ([facility])   Is the patient taking medications as prescribed? yes   Does the patient have transportation home? Yes   Transportation Anticipated family or friend will provide   Does the patient receive services at the Coumadin Clinic? No   Discharge Plan A Home with family;Home   DME Needed Upon Discharge    (tbd)   Patient/Family in Agreement with Plan yes

## 2020-07-24 NOTE — TRANSFER OF CARE
"Anesthesia Transfer of Care Note    Patient: Taty Kenney    Procedure(s) Performed: Procedure(s) (LRB):  LEFT ANKLE INFECTION IRRIGATION AND DEBRIDEMENT, possible External fixation (Left)    Patient location: PACU    Anesthesia Type: general    Transport from OR: Transported from OR on 6-10 L/min O2 by face mask with adequate spontaneous ventilation    Post pain: adequate analgesia    Post assessment: no apparent anesthetic complications and tolerated procedure well    Post vital signs: stable    Level of consciousness: awake, alert and oriented    Nausea/Vomiting: no nausea/vomiting    Complications: none    Transfer of care protocol was followed      Last vitals:   Visit Vitals  BP (!) 140/83   Temp 36.6 °C (97.8 °F) (Skin)   Resp 16   Ht 5' 6" (1.676 m)   Wt 81.6 kg (180 lb)   SpO2 96%   Breastfeeding No   BMI 29.05 kg/m²     "

## 2020-07-24 NOTE — PT/OT/SLP PROGRESS
"Physical Therapy  Eval Attempt    Patient Name:  Taty Kenney   MRN:  [id number]    Patient not seen today secondary to Patient fatigue- pt stating "I'm about to fall asleep" and requesting to begin therapy tomorrow. Will follow-up.    Rossi An, PT   7/24/2020      "

## 2020-07-24 NOTE — PT/OT/SLP PROGRESS
Occupational Therapy  Visit Attempt     Patient Name:  Taty Kenney   MRN:  [id number]    Patient not seen today secondary to Patient fatigue(pt reports increased fatigue following surgery). Will follow-up as available.    Diane White OT  7/24/2020

## 2020-07-24 NOTE — PROGRESS NOTES
Pharmacokinetic Initial Assessment: IV Vancomycin    Assessment/Plan:    Initiate intravenous vancomycin with loading dose of 2000 mg once followed by a maintenance dose of vancomycin 1250mg IV every 12 hours  Desired empiric serum trough concentration is 15 to 20 mcg/mL  Draw vancomycin trough level 30 min prior to fourth dose on 7/26 at approximately 0030  Pharmacy will continue to follow and monitor vancomycin.      Please contact pharmacy at extension [phone] with any questions regarding this assessment.     Thank you for the consult,   Crystal Alvaradoy       Patient brief summary:  Taty Kenney is a 60 y.o. female initiated on antimicrobial therapy with IV Vancomycin for treatment of suspected bone/joint infection    Drug Allergies:   Review of patient's allergies indicates:   Allergen Reactions    Augmentin [amoxicillin-pot clavulanate] Other (See Comments)     Stomach cramps       Actual Body Weight:   88.6 kg    Renal Function:   Estimated Creatinine Clearance: 82.8 mL/min (based on SCr of 0.8 mg/dL).,     Dialysis Method (if applicable):  N/A    CBC (last 72 hours):  Recent Labs   Lab Result Units 07/24/20  1013   WBC K/uL 12.59   Hemoglobin g/dL 11.5*   Hematocrit % 35.2*   Platelets K/uL 290   Gran% % 90.0*   Lymph% % 7.5*   Mono% % 1.2*   Eosinophil% % 0.5   Basophil% % 0.2   Differential Method  Automated       Metabolic Panel (last 72 hours):  Recent Labs   Lab Result Units 07/24/20  1013   Creatinine mg/dL 0.8       Drug levels (last 3 results):  No results for input(s): VANCOMYCINRA, VANCOMYCINPE, VANCOMYCINTR in the last 72 hours.    Microbiologic Results:  Microbiology Results (last 7 days)       Procedure Component Value Units Date/Time    Aerobic culture [[id number]] Collected: 07/24/20 0905    Order Status: Sent Specimen: Skin from Ankle, Left Updated: 07/24/20 0953    Fungus culture [[id number]] Collected: 07/24/20 0905    Order Status: Sent Specimen: Tissue from Ankle, Left Updated:  07/24/20 0953    AFB Culture & Smear [[id number]] Collected: 07/24/20 0905    Order Status: Sent Specimen: Tissue from Ankle, Left Updated: 07/24/20 0953    Gram stain [[id number]] Collected: 07/24/20 0905    Order Status: Sent Specimen: Tissue from Ankle, Left Updated: 07/24/20 0952    Culture, Anaerobe [[id number]] Collected: 07/24/20 0905    Order Status: Sent Specimen: Tissue from Ankle, Left Updated: 07/24/20 0952    AFB Culture & Smear [[id number]] Collected: 07/24/20 0904    Order Status: Sent Specimen: Wound from Ankle, Left Updated: 07/24/20 0950    Fungus culture [[id number]] Collected: 07/24/20 0904    Order Status: Sent Specimen: Wound from Ankle, Left Updated: 07/24/20 0950    Culture, Anaerobe [[id number]] Collected: 07/24/20 0904    Order Status: Sent Specimen: Wound from Ankle, Left Updated: 07/24/20 0950    Aerobic culture [[id number]] Collected: 07/24/20 0904    Order Status: Sent Specimen: Wound from Ankle, Left Updated: 07/24/20 0949    Gram stain [[id number]] Collected: 07/24/20 0904    Order Status: Sent Specimen: Wound from Ankle, Left Updated: 07/24/20 0949    Gram stain [[id number]] Collected: 07/24/20 0904    Order Status: Sent Specimen: Wound from Ankle, Left Updated: 07/24/20 0947    Fungus culture [[id number]] Collected: 07/24/20 0904    Order Status: Sent Specimen: Wound from Ankle, Left Updated: 07/24/20 0947    Culture, Anaerobe [[id number]] Collected: 07/24/20 0904    Order Status: Sent Specimen: Wound from Ankle, Left Updated: 07/24/20 0946    Aerobic culture [[id number]] Collected: 07/24/20 0904    Order Status: Sent Specimen: Wound from Ankle, Left Updated: 07/24/20 0946    AFB Culture & Smear [[id number]] Collected: 07/24/20 0904    Order Status: Sent Specimen: Wound from Ankle, Left Updated: 07/24/20 0946    Gram stain [[id number]] Collected: 07/24/20 0904    Order Status: Sent Specimen: Wound from Ankle, Left Updated: 07/24/20 0944    AFB Culture & Smear [[id number]] Collected: 07/24/20 0904     Order Status: Sent Specimen: Wound from Ankle, Left Updated: 07/24/20 0944    Culture, Anaerobe [[id number]] Collected: 07/24/20 0904    Order Status: Sent Specimen: Wound from Ankle, Left Updated: 07/24/20 0943    Fungus culture [[id number]] Collected: 07/24/20 0904    Order Status: Sent Specimen: Wound from Ankle, Left Updated: 07/24/20 0943    Aerobic culture [[id number]] Collected: 07/24/20 0904    Order Status: Sent Specimen: Wound from Ankle, Left Updated: 07/24/20 0943

## 2020-07-24 NOTE — ANESTHESIA POSTPROCEDURE EVALUATION
Anesthesia Post Evaluation    Patient: Taty Kenney    Procedure(s) Performed: Procedure(s) (LRB):  LEFT ANKLE INFECTION IRRIGATION AND DEBRIDEMENT, possible External fixation (Left)    Final Anesthesia Type: general    Patient location during evaluation: PACU  Patient participation: Yes- Able to Participate  Level of consciousness: awake and alert, oriented and awake  Post-procedure vital signs: reviewed and stable  Pain management: adequate  Airway patency: patent    PONV status at discharge: No PONV  Anesthetic complications: no      Cardiovascular status: blood pressure returned to baseline  Respiratory status: unassisted and room air  Hydration status: euvolemic  Follow-up not needed.          Vitals Value Taken Time   /66 07/24/20 1004   Temp 36.3 °C (97.3 °F) 07/24/20 0850   Pulse 68 07/24/20 1008   Resp 17 07/24/20 1008   SpO2 96 % 07/24/20 1008   Vitals shown include unvalidated device data.      No case tracking events are documented in the log.      Pain/Pranav Score: Pain Rating Prior to Med Admin: 7 (7/24/2020  8:58 AM)  Pranav Score: 8 (7/24/2020  9:45 AM)

## 2020-07-25 LAB
ALBUMIN SERPL BCP-MCNC: 3.1 G/DL (ref 3.5–5.2)
ALP SERPL-CCNC: 85 U/L (ref 55–135)
ALT SERPL W/O P-5'-P-CCNC: 9 U/L (ref 10–44)
ANION GAP SERPL CALC-SCNC: 5 MMOL/L (ref 8–16)
AST SERPL-CCNC: 14 U/L (ref 10–40)
BILIRUB SERPL-MCNC: 0.2 MG/DL (ref 0.1–1)
BUN SERPL-MCNC: 18 MG/DL (ref 8–23)
CALCIUM SERPL-MCNC: 9 MG/DL (ref 8.7–10.5)
CHLORIDE SERPL-SCNC: 101 MMOL/L (ref 95–110)
CO2 SERPL-SCNC: 28 MMOL/L (ref 23–29)
CREAT SERPL-MCNC: 0.8 MG/DL (ref 0.5–1.4)
CRP SERPL-MCNC: 13.9 MG/L (ref 0–8.2)
ERYTHROCYTE [SEDIMENTATION RATE] IN BLOOD BY WESTERGREN METHOD: 71 MM/HR (ref 0–20)
EST. GFR  (AFRICAN AMERICAN): >60 ML/MIN/1.73 M^2
EST. GFR  (NON AFRICAN AMERICAN): >60 ML/MIN/1.73 M^2
GLUCOSE SERPL-MCNC: 110 MG/DL (ref 70–110)
POTASSIUM SERPL-SCNC: 4.2 MMOL/L (ref 3.5–5.1)
PROT SERPL-MCNC: 6.6 G/DL (ref 6–8.4)
SODIUM SERPL-SCNC: 134 MMOL/L (ref 136–145)

## 2020-07-25 PROCEDURE — 11000001 HC ACUTE MED/SURG PRIVATE ROOM

## 2020-07-25 PROCEDURE — 63600175 PHARM REV CODE 636 W HCPCS: Performed by: ORTHOPAEDIC SURGERY

## 2020-07-25 PROCEDURE — 86140 C-REACTIVE PROTEIN: CPT

## 2020-07-25 PROCEDURE — 80053 COMPREHEN METABOLIC PANEL: CPT

## 2020-07-25 PROCEDURE — 25000003 PHARM REV CODE 250: Performed by: ORTHOPAEDIC SURGERY

## 2020-07-25 PROCEDURE — 99900035 HC TECH TIME PER 15 MIN (STAT)

## 2020-07-25 PROCEDURE — 94799 UNLISTED PULMONARY SVC/PX: CPT

## 2020-07-25 PROCEDURE — A4216 STERILE WATER/SALINE, 10 ML: HCPCS | Performed by: ORTHOPAEDIC SURGERY

## 2020-07-25 PROCEDURE — 99900037 HC PT THERAPY SCREENING (STAT)

## 2020-07-25 PROCEDURE — 94761 N-INVAS EAR/PLS OXIMETRY MLT: CPT

## 2020-07-25 PROCEDURE — 85652 RBC SED RATE AUTOMATED: CPT

## 2020-07-25 RX ORDER — IBUPROFEN 400 MG/1
400 TABLET ORAL EVERY 4 HOURS PRN
Status: DISCONTINUED | OUTPATIENT
Start: 2020-07-25 | End: 2020-07-29 | Stop reason: HOSPADM

## 2020-07-25 RX ADMIN — VANCOMYCIN HYDROCHLORIDE 1250 MG: 1.25 INJECTION, POWDER, LYOPHILIZED, FOR SOLUTION INTRAVENOUS at 12:07

## 2020-07-25 RX ADMIN — HYDROMORPHONE HYDROCHLORIDE 1 MG: 2 INJECTION, SOLUTION INTRAMUSCULAR; INTRAVENOUS; SUBCUTANEOUS at 01:07

## 2020-07-25 RX ADMIN — OXYCODONE HYDROCHLORIDE AND ACETAMINOPHEN 1 TABLET: 5; 325 TABLET ORAL at 05:07

## 2020-07-25 RX ADMIN — FAMOTIDINE 20 MG: 20 TABLET ORAL at 08:07

## 2020-07-25 RX ADMIN — Medication 10 ML: at 05:07

## 2020-07-25 RX ADMIN — IBUPROFEN 400 MG: 400 TABLET, FILM COATED ORAL at 09:07

## 2020-07-25 RX ADMIN — ASPIRIN 81 MG: 81 TABLET, COATED ORAL at 08:07

## 2020-07-25 RX ADMIN — VANCOMYCIN HYDROCHLORIDE 1250 MG: 1.25 INJECTION, POWDER, LYOPHILIZED, FOR SOLUTION INTRAVENOUS at 01:07

## 2020-07-25 RX ADMIN — OXYCODONE HYDROCHLORIDE AND ACETAMINOPHEN 1 TABLET: 5; 325 TABLET ORAL at 11:07

## 2020-07-25 RX ADMIN — PROPRANOLOL HYDROCHLORIDE 20 MG: 10 TABLET ORAL at 08:07

## 2020-07-25 RX ADMIN — Medication 10 ML: at 11:07

## 2020-07-25 RX ADMIN — HYDROMORPHONE HYDROCHLORIDE 1 MG: 2 INJECTION, SOLUTION INTRAMUSCULAR; INTRAVENOUS; SUBCUTANEOUS at 10:07

## 2020-07-25 RX ADMIN — FLUOXETINE HYDROCHLORIDE 60 MG: 20 CAPSULE ORAL at 08:07

## 2020-07-25 RX ADMIN — IBUPROFEN 400 MG: 400 TABLET, FILM COATED ORAL at 08:07

## 2020-07-25 NOTE — PROGRESS NOTES
[facility] Progress Note    Assessment/Plan:     1. Deep postoperative wound infection  59 yo woman s/p I&D after developing deep wound infection following ORIF for trimalleolar fracture. Hardware retained. ESR 71, CRP 13.9.  - Continue vancomycin, will follow cultures.   - Final dosing recs for vancomycin cannot be made until trough is drawn prior to dose 4 tomorrow.  - Follow CBC, BMP daily while inpatient.   - Would also recommend Tdap vaccine if patient is not UTD (none in [facility] system and she is unsure of last vaccination).    Josh Snyder MD  [facility], PGY-4    Thank you for allowing us to participate in the care of this patient. We will continue to follow along. Case has been discussed with consult staff, who is in agreement with assessment and plan.     Subjective:      Taty Kenney is a 60 y.o. female who is being followed by the [facility] service at [facility] for deep postoperative wound infection.     Afebrile, no issues overnight. Refused therapy due to pain in her foot.       Objective:   Last 24 Hour Vital Signs:  BP  Min: 107/76  Max: 159/77  Temp  Av.1 °F (36.7 °C)  Min: 97 °F (36.1 °C)  Max: 99 °F (37.2 °C)  Pulse  Av.3  Min: 66  Max: 83  Resp  Av.8  Min: 14  Max: 20  SpO2  Av.3 %  Min: 93 %  Max: 97 %  Weight  Av.2 kg (203 lb 4.2 oz)  Min: 88.6 kg (195 lb 5.2 oz)  Max: 95.8 kg (211 lb 3.2 oz)  I/O last 3 completed shifts:  In: 1870 [P.O.:120; I.V.:1000; IV Piggyback:750]  Out: 300 [Urine:300]    Physical Exam  Constitutional:       General: She is not in acute distress.     Appearance: Normal appearance. She is not ill-appearing or diaphoretic.   HENT:      Head: Normocephalic and atraumatic.      Nose: Nose normal.      Mouth/Throat:      Mouth: Mucous membranes are moist.      Pharynx: Oropharynx is clear. No oropharyngeal exudate or posterior oropharyngeal erythema.   Eyes:      General: No  scleral icterus.     Conjunctiva/sclera: Conjunctivae normal.   Neck:      Musculoskeletal: Normal range of motion and neck supple. No neck rigidity.   Cardiovascular:      Rate and Rhythm: Normal rate and regular rhythm.      Heart sounds: Normal heart sounds.   Pulmonary:      Effort: Pulmonary effort is normal.      Breath sounds: Normal breath sounds.   Abdominal:      General: There is no distension.      Palpations: Abdomen is soft.      Tenderness: There is no abdominal tenderness.   Musculoskeletal:      Comments: Left leg in post-surgical dressing.   Skin:     Coloration: Skin is not jaundiced.      Findings: No lesion or rash.   Neurological:      General: No focal deficit present.      Mental Status: She is alert and oriented to person, place, and time.   Psychiatric:         Mood and Affect: Mood normal.         Behavior: Behavior normal.     Laboratory:  Laboratory Data Reviewed: yes  Pertinent Findings:  Recent Labs   Lab 07/24/20  1013 07/25/20  0344   WBC 12.59  --    HGB 11.5*  --    HCT 35.2*  --      --    MCV 95  --    RDW 13.3  --    NA  --  134*   K  --  4.2   CL  --  101   CO2  --  28   BUN  --  18   CREATININE 0.8 0.8   GLU  --  110   PROT  --  6.6   ALBUMIN  --  3.1*   BILITOT  --  0.2   AST  --  14   ALKPHOS  --  85   ALT  --  9*         Microbiology Data Reviewed: yes  Pertinent Findings:  Wound cultures (7/24) NGTD, Gram stains with no organisms.    Other Results:  Radiology Data Reviewed: yes  Pertinent Findings:  CXR with appropriate PICC placement, no acute intrathoracic findings.     Current Medications:     Infusions:       Scheduled:   aspirin  81 mg Oral Daily    famotidine  20 mg Oral BID    FLUoxetine  60 mg Oral Daily    HYDROmorphone  1 mg Intravenous Q8H    oxyCODONE-acetaminophen  1 tablet Oral Q6H    propranoloL  20 mg Oral BID    sodium chloride 0.9%  10 mL Intravenous Q6H    vancomycin (VANCOCIN) IVPB  1,250 mg Intravenous Q12H        PRN:  ibuprofen,  ondansetron, Flushing PICC Protocol **AND** sodium chloride 0.9% **AND** sodium chloride 0.9%, Pharmacy to dose Vancomycin consult **AND** vancomycin - pharmacy to dose    Antibiotics and Day Number of Therapy:  Cefazolin 7/24  Vancomycin 7/24 to current    Lines and Day Number of Therapy:  PICC 7/24 to current

## 2020-07-25 NOTE — PLAN OF CARE
VN note:   Pt's chart, labs and vital signs reviewed, will be available to intervene if needed.

## 2020-07-25 NOTE — PLAN OF CARE
AAOx4. Complaints of L ankle pain with moderate relief from scheduled pain meds. Tolerating regular diet. L ankle dressing CDI, wound vac intact. R UA PICC intact, dressing CDI. IV ABX given per MD orders. Remains NWB to LLE. External cath placed at 2130. Bed locked in lowest position, bed alarm set and call bell within reach. Will continue to monitor.

## 2020-07-25 NOTE — PT/OT/SLP PROGRESS
Occupational Therapy  Missed Eval    Patient Name:  Taty Kenney   MRN:  [id number]    Patient not seen today secondary to Patient unwilling to participate, Pain. Will follow-up Sun.    CARTER Villatoro  7/25/2020

## 2020-07-25 NOTE — PT/OT/SLP PROGRESS
"Physical Therapy Screen/Eval Attempt      1132-1144am: PT session started, patient cooperated c/ history taking- reports that she lives c/ mom in [facility], [place]. Has RW, rollator and reymundo axillary crutches and WC. PT stepped out of room to get supplies for transfer to chair. Upon entry back into the room; Pt stated "I am not doing this my foot hurts". Pt thereafter on refused to participate in session despite education/ecouragement. MARIN Ivan Notified.     Dheeraj Bergeron PT, DPT  7/25/2020          "

## 2020-07-25 NOTE — PROGRESS NOTES
[facility] Progress Note    Orthopaedic Surgeries:  60F s/p L faustina fx I&D on 7/24/20 for postop wound infection and incisional wound vac placement    S:   NAEO, VSS, resting comfortably, pain well controlled, wound vac in place to good suction, minimal output. Denies CP, SOB, dizziness.    O:   Vitals:    07/25/20 0811   BP: 131/69   Pulse: 83   Resp: 16   Temp: 98.1 °F (36.7 °C)     Recent Labs     07/24/20  1013   WBC 12.59   HGB 11.5*   HCT 35.2*        Recent Labs     07/25/20  0344   *   K 4.2      CO2 28   BUN 18        Recent Labs     07/25/20  0344   CRP 13.9*       PE:  Gen: A+Ox3, NAD  Card: RRR by RP  Lungs: nonlabored breathing, symmetric chest rise  Abd: S/NT/ND  LLE  Short leg splint in place  Wound vac in place to good suction  Toes warm and well perfused  EHL/FHL 5/5    Cultures: NGTD    A/P:  60F s/p L faustina fx ORIF 7/7/20, with I&D on 7/24/20 for postop wound infection and incisional wound vac placement    -admitted as inpatient for IV abx and infectious disease evaluation  -cultures NGTD  -ID recommending broad spectrum vanc at this time until cultures result  -Vanc trough to be drawn tomorrow for final dosing recs  -incisional wound vac in place to L medial and lateral mal, minimal output, will plan changes Q2 days  -home wound vac delivered  -PICC line placed  -PO pain control at this time  -regular diet  -DVT ppx Aspirin PO  -NWB LLE  -follow up in clinic w Dr Naidu in 2 weeks  ?  DC Dispo:   1. pending ID abx recs after vanc trough drawn tomorrow  2. PT will need home health PICC IV abx  3. also wound vac changes by wound care    Dr. Maria Elena Johnson, PGY3  [facility]  Pager: [phone]

## 2020-07-25 NOTE — NURSING
Pt alert and oriented x4. Per PT/OT, pt still refusing therapy due to pain. When asked to rate pain throughout day, not >5/10. Managed by scheduled IV dilaudid and PO percocet. Purewick in place. Tolerating a regular diet w/ no n/v. IV Vanc administered. Safety maintained, bed locked and in lowest position w/ bed alarm on, call light w/ in reach.

## 2020-07-26 LAB
ALBUMIN SERPL BCP-MCNC: 3.2 G/DL (ref 3.5–5.2)
ALP SERPL-CCNC: 81 U/L (ref 55–135)
ALT SERPL W/O P-5'-P-CCNC: 11 U/L (ref 10–44)
ANION GAP SERPL CALC-SCNC: 7 MMOL/L (ref 8–16)
AST SERPL-CCNC: 18 U/L (ref 10–40)
BILIRUB SERPL-MCNC: 0.2 MG/DL (ref 0.1–1)
BUN SERPL-MCNC: 15 MG/DL (ref 8–23)
CALCIUM SERPL-MCNC: 9.3 MG/DL (ref 8.7–10.5)
CHLORIDE SERPL-SCNC: 103 MMOL/L (ref 95–110)
CO2 SERPL-SCNC: 27 MMOL/L (ref 23–29)
CREAT SERPL-MCNC: 0.9 MG/DL (ref 0.5–1.4)
EST. GFR  (AFRICAN AMERICAN): >60 ML/MIN/1.73 M^2
EST. GFR  (NON AFRICAN AMERICAN): >60 ML/MIN/1.73 M^2
GLUCOSE SERPL-MCNC: 96 MG/DL (ref 70–110)
POTASSIUM SERPL-SCNC: 4.7 MMOL/L (ref 3.5–5.1)
PROT SERPL-MCNC: 6.7 G/DL (ref 6–8.4)
SODIUM SERPL-SCNC: 137 MMOL/L (ref 136–145)
VANCOMYCIN SERPL-MCNC: 14.3 UG/ML
VANCOMYCIN TROUGH SERPL-MCNC: 24.2 UG/ML (ref 10–22)

## 2020-07-26 PROCEDURE — 80202 ASSAY OF VANCOMYCIN: CPT

## 2020-07-26 PROCEDURE — 97165 OT EVAL LOW COMPLEX 30 MIN: CPT

## 2020-07-26 PROCEDURE — A4216 STERILE WATER/SALINE, 10 ML: HCPCS | Performed by: ORTHOPAEDIC SURGERY

## 2020-07-26 PROCEDURE — 25000003 PHARM REV CODE 250: Performed by: ORTHOPAEDIC SURGERY

## 2020-07-26 PROCEDURE — 63600175 PHARM REV CODE 636 W HCPCS: Performed by: ORTHOPAEDIC SURGERY

## 2020-07-26 PROCEDURE — 94761 N-INVAS EAR/PLS OXIMETRY MLT: CPT

## 2020-07-26 PROCEDURE — 11000001 HC ACUTE MED/SURG PRIVATE ROOM

## 2020-07-26 PROCEDURE — 80053 COMPREHEN METABOLIC PANEL: CPT

## 2020-07-26 PROCEDURE — 94799 UNLISTED PULMONARY SVC/PX: CPT

## 2020-07-26 PROCEDURE — 80202 ASSAY OF VANCOMYCIN: CPT | Mod: 91

## 2020-07-26 RX ORDER — FAMOTIDINE 20 MG/1
20 TABLET, FILM COATED ORAL 2 TIMES DAILY
Status: DISCONTINUED | OUTPATIENT
Start: 2020-07-26 | End: 2020-07-29 | Stop reason: HOSPADM

## 2020-07-26 RX ORDER — ASPIRIN 81 MG/1
81 TABLET ORAL DAILY
Qty: 42 TABLET | Refills: 0 | Status: SHIPPED | OUTPATIENT
Start: 2020-07-26 | End: 2020-09-06

## 2020-07-26 RX ORDER — AMOXICILLIN 250 MG
1 CAPSULE ORAL 2 TIMES DAILY
Status: DISCONTINUED | OUTPATIENT
Start: 2020-07-26 | End: 2020-07-29 | Stop reason: HOSPADM

## 2020-07-26 RX ORDER — OXYCODONE AND ACETAMINOPHEN 5; 325 MG/1; MG/1
1 TABLET ORAL EVERY 6 HOURS PRN
Qty: 30 TABLET | Refills: 0 | Status: SHIPPED | OUTPATIENT
Start: 2020-07-26 | End: 2020-10-02 | Stop reason: CLARIF

## 2020-07-26 RX ORDER — ASPIRIN 81 MG/1
81 TABLET ORAL 2 TIMES DAILY
Qty: 84 TABLET | Refills: 0 | Status: ON HOLD | OUTPATIENT
Start: 2020-07-26 | End: 2020-10-07 | Stop reason: HOSPADM

## 2020-07-26 RX ORDER — BISACODYL 10 MG
10 SUPPOSITORY, RECTAL RECTAL 2 TIMES DAILY PRN
Status: DISCONTINUED | OUTPATIENT
Start: 2020-07-26 | End: 2020-07-29 | Stop reason: HOSPADM

## 2020-07-26 RX ADMIN — Medication 10 ML: at 05:07

## 2020-07-26 RX ADMIN — FAMOTIDINE 20 MG: 20 TABLET ORAL at 10:07

## 2020-07-26 RX ADMIN — PROPRANOLOL HYDROCHLORIDE 20 MG: 10 TABLET ORAL at 10:07

## 2020-07-26 RX ADMIN — OXYCODONE HYDROCHLORIDE AND ACETAMINOPHEN 1 TABLET: 5; 325 TABLET ORAL at 12:07

## 2020-07-26 RX ADMIN — HYDROMORPHONE HYDROCHLORIDE 1 MG: 2 INJECTION, SOLUTION INTRAMUSCULAR; INTRAVENOUS; SUBCUTANEOUS at 07:07

## 2020-07-26 RX ADMIN — FLUOXETINE HYDROCHLORIDE 60 MG: 20 CAPSULE ORAL at 10:07

## 2020-07-26 RX ADMIN — IBUPROFEN 400 MG: 400 TABLET, FILM COATED ORAL at 08:07

## 2020-07-26 RX ADMIN — ASPIRIN 81 MG: 81 TABLET, COATED ORAL at 10:07

## 2020-07-26 RX ADMIN — Medication 10 ML: at 12:07

## 2020-07-26 RX ADMIN — VANCOMYCIN HYDROCHLORIDE 1500 MG: 1.5 INJECTION, POWDER, LYOPHILIZED, FOR SOLUTION INTRAVENOUS at 05:07

## 2020-07-26 RX ADMIN — OXYCODONE HYDROCHLORIDE AND ACETAMINOPHEN 1 TABLET: 5; 325 TABLET ORAL at 05:07

## 2020-07-26 RX ADMIN — STANDARDIZED SENNA CONCENTRATE AND DOCUSATE SODIUM 1 TABLET: 8.6; 5 TABLET ORAL at 08:07

## 2020-07-26 RX ADMIN — PROPRANOLOL HYDROCHLORIDE 20 MG: 10 TABLET ORAL at 08:07

## 2020-07-26 RX ADMIN — HYDROMORPHONE HYDROCHLORIDE 1 MG: 2 INJECTION, SOLUTION INTRAMUSCULAR; INTRAVENOUS; SUBCUTANEOUS at 02:07

## 2020-07-26 RX ADMIN — FAMOTIDINE 20 MG: 20 TABLET ORAL at 11:07

## 2020-07-26 RX ADMIN — HYDROMORPHONE HYDROCHLORIDE 1 MG: 2 INJECTION, SOLUTION INTRAMUSCULAR; INTRAVENOUS; SUBCUTANEOUS at 10:07

## 2020-07-26 RX ADMIN — FAMOTIDINE 20 MG: 20 TABLET ORAL at 08:07

## 2020-07-26 NOTE — PROGRESS NOTES
[facility] Progress Note     Orthopaedic Surgeries:  60F s/p L faustina fx I&D on 7/24/20 for postop wound infection and incisional wound vac placement     S:   NAEO, VSS, resting comfortably, pain well controlled, wound vac in place to good suction and changed at bedside today, minimal output. Denies CP, SOB, dizziness.     O:        PE:  Gen: A+Ox3, NAD  Card: RRR by RP  Lungs: nonlabored breathing, symmetric chest rise  Abd: S/NT/ND  LLE  Short leg splint in place  Removed, wounds well approximated  No drainage or purulence or fluctuance  Mild erythema around skin edges  Some numbness over dorsum of foot  Wound vac in place to good suction  Toes warm and well perfused  EHL/FHL 5/5     Cultures: NGTD     A/P:  60F s/p L faustina fx ORIF 7/7/20, with I&D on 7/24/20 for postop wound infection and incisional wound vac placement     -admitted as inpatient for IV abx and infectious disease evaluation  -cultures growing Staph, still in progress  -ID recommending broad spectrum vanc at this time until cultures result  -Vanc trough high today, will recheck this afternoon for home IV abx dosage  -incisional wound vac in place to L medial and lateral mal, minimal output, will plan changes Q2 days  -home wound vac delivered, explained to pt instructions  -PICC line placed  -PO pain control at this time  -regular diet  -DVT ppx Aspirin PO  -NWB LLE  -follow up in clinic w Dr Naidu in 2 weeks  ?  DC Dispo:   1. pending ID abx recs after vanc trough drawn later today  2. PT will need home health PICC IV abx  3. also wound vac changes by wound care     Dr. Maria Elena Johnson, PGY3  [facility]  Pager: [phone]

## 2020-07-26 NOTE — PLAN OF CARE
OT ambrose performed, report to follow    Rec home w/HH therapy    Problem: Occupational Therapy Goal  Goal: Occupational Therapy Goal  Description: Goals to be met by: 8/26     Patient will increase functional independence with ADLs by performing:    UE Dressing with Set-up Assistance.  LE Dressing with Minimal Assistance.  Grooming while seated with Modified [place].  Toileting from bedside commode with Supervision for hygiene and clothing management.   Toilet transfer to bedside commode with Supervision.  Upper extremity dumbbell exercise program x10 reps per handout, with independence.    Outcome: Ongoing, Progressing

## 2020-07-26 NOTE — PT/OT/SLP EVAL
Occupational Therapy   Evaluation    Name: Taty Kenney  MRN: [id number]  Admitting Diagnosis:  <principal problem not specified> 2 Days Post-Op    Recommendations:     Discharge Recommendations: home health OT, home health PT  Discharge Equipment Recommendations:  none  Barriers to discharge:  None    Assessment:     Taty Kenney is a 60 y.o. female with a medical diagnosis of <principal problem not specified>.  She presents with performance deficits affecting function: weakness, impaired endurance, impaired self care skills, impaired functional mobilty, gait instability, impaired balance, decreased lower extremity function, pain, orthopedic precautions, decreased ROM, impaired joint extensibility.      Rehab Prognosis: Good; patient would benefit from acute skilled OT services to address these deficits and reach maximum level of function.       Plan:     Patient to be seen 5 x/week to address the above listed problems via self-care/home management, therapeutic activities, therapeutic exercises  · Plan of Care Expires: 08/26/20  · Plan of Care Reviewed with: patient    Subjective     Chief Complaint: L ankle pain  Patient/Family Comments/goals: get over all this    Occupational Profile:  Living Environment: Lives w/eldery mother in [facility] no [place]  Previous level of function: wc level min A-mod I  Roles and Routines: sink bath, wc level activities  Equipment Used at Home:  bedside commode, wheelchair, walker, rolling, rollator, crutches, axillary  Assistance upon Discharge: daughter comes over daily to assist    Pain/Comfort:  · Pain Rating 1: 5/10  · Location - Side 1: Left  · Location - Orientation 1: generalized  · Location 1: ankle  · Pain Addressed 1: Pre-medicate for activity, Cessation of Activity, Reposition, Nurse notified, Distraction  · Pain Rating Post-Intervention 1: 5/10    Patients cultural, spiritual, Advent conflicts given the current situation: no    Objective:     Communicated with:  nurse prior to session.  Patient found HOB elevated with PICC line, wound vac upon OT entry to room.    General Precautions: Standard, fall   Orthopedic Precautions:LLE non weight bearing   Braces:       Occupational Performance:    Bed Mobility:    · Pt declinedNT    Functional Mobility/Transfers:  ·   · Functional Mobility: NT    Activities of Daily Living:  NT    AMPAC 6 Click ADL:  AMPAC Total Score: 19    Treatment & Education:  Pt educated on role of OT/POC, benefits of therapy participation.  MD present and encouraged bed level/EOB activity.   Education:    Patient left HOB elevated with all lines intact, call button in reach, bed alarm on and nurse and MD present    GOALS:   Multidisciplinary Problems     Occupational Therapy Goals        Problem: Occupational Therapy Goal    Goal Priority Disciplines Outcome Interventions   Occupational Therapy Goal     OT, PT/OT Ongoing, Progressing    Description: Goals to be met by: 8/26     Patient will increase functional independence with ADLs by performing:    UE Dressing with Set-up Assistance.  LE Dressing with Minimal Assistance.  Grooming while seated with Modified [place].  Toileting from bedside commode with Supervision for hygiene and clothing management.   Toilet transfer to bedside commode with Supervision.  Upper extremity dumbbell exercise program x10 reps per handout, with independence.                     History:     Past Medical History:   Diagnosis Date    Anxiety     Depression     Hypertension        Past Surgical History:   Procedure Laterality Date    HYSTERECTOMY      OPEN REDUCTION AND INTERNAL FIXATION (ORIF) OF INJURY OF ANKLE Left 7/7/2020    Procedure: ORIF, ANKLE;  Surgeon: Triston Naidu IV, MD;  Location: [facility];  Service: Orthopedics;  Laterality: Left;  GENERAL + REGIONAL  ORTHO MAJOR TRAY  BONE FOAM RAMP  FLUOROSCOPY  Hair Lopez confirmed CW 7/6       Time Tracking:     OT Date of Treatment: 07/26/20  OT Start  Time: 0952  OT Stop Time: 1008  OT Total Time (min): 16 min    Billable Minutes:Evaluation 16    Andrés Tobar OT  7/26/2020

## 2020-07-26 NOTE — PROGRESS NOTES
Pharmacokinetic Assessment Follow Up: IV Vancomycin    Vancomycin serum concentration assessment(s):    The trough level was drawn correctly and can be used to guide therapy at this time. The measurement is above the desired definitive target range of 15 to 20 mcg/mL.    Vancomycin Regimen Plan:    Discontinue the scheduled vancomycin regimen and re-dose when the random level is less than 20 mcg/mL, next level to be drawn at 7/26/20 on 1300.    Drug levels (last 3 results):  Recent Labs   Lab Result Units 07/26/20  0049   Vancomycin-Trough ug/mL 24.2*       Pharmacy will continue to follow and monitor vancomycin.    Please contact pharmacy at extension [phone] for questions regarding this assessment.    Thank you for the consult,   Lenard Foote       Patient brief summary:  Taty Kenney is a 60 y.o. female initiated on antimicrobial therapy with IV Vancomycin for treatment of bone/joint infection    The patient's current regimen is 1250mg q12    Drug Allergies:   Review of patient's allergies indicates:   Allergen Reactions    Augmentin [amoxicillin-pot clavulanate] Other (See Comments)     Stomach cramps       Actual Body Weight:   95kg    Renal Function:   Estimated Creatinine Clearance: 86.2 mL/min (based on SCr of 0.8 mg/dL).,     Dialysis Method (if applicable):  N/A    CBC (last 72 hours):  Recent Labs   Lab Result Units 07/24/20  1013   WBC K/uL 12.59   Hemoglobin g/dL 11.5*   Hematocrit % 35.2*   Platelets K/uL 290   Gran% % 90.0*   Lymph% % 7.5*   Mono% % 1.2*   Eosinophil% % 0.5   Basophil% % 0.2   Differential Method  Automated       Metabolic Panel (last 72 hours):  Recent Labs   Lab Result Units 07/24/20  1013 07/25/20  0344   Sodium mmol/L  --  134*   Potassium mmol/L  --  4.2   Chloride mmol/L  --  101   CO2 mmol/L  --  28   Glucose mg/dL  --  110   BUN, Bld mg/dL  --  18   Creatinine mg/dL 0.8 0.8   Albumin g/dL  --  3.1*   Total Bilirubin mg/dL  --  0.2   Alkaline Phosphatase U/L  --  85    AST U/L  --  14   ALT U/L  --  9*       Vancomycin Administrations:  vancomycin given in the last 96 hours                     vancomycin (VANCOCIN) 1,250 mg in dextrose 5 % 250 mL IVPB (mg) 1,250 mg New Bag 07/25/20 1348     1,250 mg New Bag  0053    vancomycin (VANCOCIN) 2,000 mg in dextrose 5 % 500 mL IVPB (mg) 2,000 mg New Bag 07/24/20 1219                    Microbiologic Results:  Microbiology Results (last 7 days)       Procedure Component Value Units Date/Time    AFB Culture & Smear [[id number]] Collected: 07/24/20 0904    Order Status: Completed Specimen: Wound from Ankle, Left Updated: 07/25/20 2127     AFB Culture & Smear Culture in progress    Narrative:      Lateral ankle 1    AFB Culture & Smear [[id number]] Collected: 07/24/20 0904    Order Status: Completed Specimen: Wound from Ankle, Left Updated: 07/25/20 2127     AFB Culture & Smear Culture in progress    Narrative:      Left medial ankle    AFB Culture & Smear [[id number]] Collected: 07/24/20 0904    Order Status: Completed Specimen: Wound from Ankle, Left Updated: 07/25/20 2127     AFB Culture & Smear Culture in progress    Narrative:      Lateral ankle 2    AFB Culture & Smear [[id number]] Collected: 07/24/20 0905    Order Status: Completed Specimen: Tissue from Ankle, Left Updated: 07/25/20 2127     AFB Culture & Smear Culture in progress    Narrative:      Left ankle lateral tissue    Aerobic culture [[id number]]  (Abnormal) Collected: 07/24/20 0904    Order Status: Completed Specimen: Wound from Ankle, Left Updated: 07/25/20 1339     Aerobic Bacterial Culture STAPHYLOCOCCUS AUREUS  Moderate  Susceptibility pending      Narrative:      Left medial ankle    Aerobic culture [[id number]]  (Abnormal) Collected: 07/24/20 0904    Order Status: Completed Specimen: Wound from Ankle, Left Updated: 07/25/20 1112     Aerobic Bacterial Culture STAPHYLOCOCCUS AUREUS  Few  Susceptibility pending      Narrative:      Lateral ankle 2    Aerobic culture  [[id number]]  (Abnormal) Collected: 07/24/20 0904    Order Status: Completed Specimen: Wound from Ankle, Left Updated: 07/25/20 1101     Aerobic Bacterial Culture STAPHYLOCOCCUS AUREUS  Moderate  Susceptibility pending      Narrative:      Lateral ankle 1    Aerobic culture [[id number]] Collected: 07/24/20 0905    Order Status: Completed Specimen: Skin from Ankle, Left Updated: 07/25/20 0732     Aerobic Bacterial Culture No growth    Narrative:      Left ankle lateral tissue    Gram stain [[id number]] Collected: 07/24/20 0904    Order Status: Completed Specimen: Wound from Ankle, Left Updated: 07/24/20 2041     Gram Stain Result No WBC's      No organisms seen    Narrative:      Left medial ankle    Gram stain [[id number]] Collected: 07/24/20 0905    Order Status: Completed Specimen: Tissue from Ankle, Left Updated: 07/24/20 2032     Gram Stain Result Rare WBC's      No organisms seen    Narrative:      Left ankle lateral tissue    Gram stain [[id number]] Collected: 07/24/20 0904    Order Status: Completed Specimen: Wound from Ankle, Left Updated: 07/24/20 2031     Gram Stain Result No WBC's      No organisms seen    Narrative:      Lateral ankle 1    Gram stain [[id number]] Collected: 07/24/20 0904    Order Status: Completed Specimen: Wound from Ankle, Left Updated: 07/24/20 2027     Gram Stain Result No WBC's      No organisms seen    Narrative:      Lateral ankle 2    Culture, Anaerobe [[id number]] Collected: 07/24/20 0904    Order Status: Sent Specimen: Wound from Ankle, Left Updated: 07/24/20 1350    Fungus culture [[id number]] Collected: 07/24/20 0905    Order Status: Sent Specimen: Tissue from Ankle, Left Updated: 07/24/20 1350    Fungus culture [[id number]] Collected: 07/24/20 0904    Order Status: Sent Specimen: Wound from Ankle, Left Updated: 07/24/20 1350    Culture, Anaerobe [[id number]] Collected: 07/24/20 0904    Order Status: Sent Specimen: Wound from Ankle, Left Updated: 07/24/20 1350    Fungus culture  [[id number]] Collected: 07/24/20 0904    Order Status: Sent Specimen: Wound from Ankle, Left Updated: 07/24/20 1350    Fungus culture [[id number]] Collected: 07/24/20 0904    Order Status: Sent Specimen: Wound from Ankle, Left Updated: 07/24/20 1350    Culture, Anaerobe [[id number]] Collected: 07/24/20 0905    Order Status: Sent Specimen: Tissue from Ankle, Left Updated: 07/24/20 1350    Culture, Anaerobe [[id number]] Collected: 07/24/20 0904    Order Status: Sent Specimen: Wound from Ankle, Left Updated: 07/24/20 1359

## 2020-07-26 NOTE — PT/OT/SLP PROGRESS
Physical Therapy      Patient Name:  Taty Kenney   MRN:  [id number]    Patient not seen today secondary to Patient unwilling to participate. Verbal order from Dr. Johnson that PT orders may be discontinued due to patient having already received therapy post-previous surgery.     Please re-consult if needed.    Nidhi Upton, PT

## 2020-07-26 NOTE — PROGRESS NOTES
Pharmacokinetic Assessment Follow Up: IV Vancomycin    Vancomycin serum concentration assessment(s):    The random level was drawn correctly and can be used to guide therapy at this time. The measurement is below the desired definitive target range of 15 to 20 mcg/mL.    Vancomycin Regimen Plan:    Change regimen to Vancomycin 1500 mg IV every 24 hours with next serum trough concentration measured at 1530 prior to 3rd dose on 7/28/20      Drug levels (last 3 results):  Recent Labs   Lab Result Units 07/26/20  0049 07/26/20  1238   Vancomycin, Random ug/mL  --  14.3   Vancomycin-Trough ug/mL 24.2*  --        Pharmacy will continue to follow and monitor vancomycin.    Please contact pharmacy at extension [phone] for questions regarding this assessment.    Thank you for the consult,   Crystal Cardona       Patient brief summary:  Taty Kenney is a 60 y.o. female initiated on antimicrobial therapy with IV Vancomycin for treatment of bone/joint infection    The patient's current regimen is 1250 mg q12h    Drug Allergies:   Review of patient's allergies indicates:   Allergen Reactions    Augmentin [amoxicillin-pot clavulanate] Other (See Comments)     Stomach cramps       Actual Body Weight:   95.8 kg    Renal Function:   Estimated Creatinine Clearance: 76.6 mL/min (based on SCr of 0.9 mg/dL).,     Dialysis Method (if applicable):  N/A    CBC (last 72 hours):  Recent Labs   Lab Result Units 07/24/20  1013   WBC K/uL 12.59   Hemoglobin g/dL 11.5*   Hematocrit % 35.2*   Platelets K/uL 290   Gran% % 90.0*   Lymph% % 7.5*   Mono% % 1.2*   Eosinophil% % 0.5   Basophil% % 0.2   Differential Method  Automated       Metabolic Panel (last 72 hours):  Recent Labs   Lab Result Units 07/24/20  1013 07/25/20  0344 07/26/20  0529   Sodium mmol/L  --  134* 137   Potassium mmol/L  --  4.2 4.7   Chloride mmol/L  --  101 103   CO2 mmol/L  --  28 27   Glucose mg/dL  --  110 96   BUN, Bld mg/dL  --  18 15   Creatinine mg/dL 0.8 0.8  0.9   Albumin g/dL  --  3.1* 3.2*   Total Bilirubin mg/dL  --  0.2 0.2   Alkaline Phosphatase U/L  --  85 81   AST U/L  --  14 18   ALT U/L  --  9* 11       Vancomycin Administrations:  vancomycin given in the last 96 hours                     vancomycin (VANCOCIN) 1,250 mg in dextrose 5 % 250 mL IVPB (mg) 1,250 mg New Bag 07/25/20 1348     1,250 mg New Bag  0053    vancomycin (VANCOCIN) 2,000 mg in dextrose 5 % 500 mL IVPB (mg) 2,000 mg New Bag 07/24/20 1219                    Microbiologic Results:  Microbiology Results (last 7 days)       Procedure Component Value Units Date/Time    Aerobic culture [[id number]]  (Abnormal)  (Susceptibility) Collected: 07/24/20 0904    Order Status: Completed Specimen: Wound from Ankle, Left Updated: 07/26/20 1444     Aerobic Bacterial Culture METHICILLIN RESISTANT STAPHYLOCOCCUS AUREUS  Moderate      Narrative:      Left medial ankle    Aerobic culture [[id number]]  (Abnormal)  (Susceptibility) Collected: 07/24/20 0904    Order Status: Completed Specimen: Wound from Ankle, Left Updated: 07/26/20 1444     Aerobic Bacterial Culture METHICILLIN RESISTANT STAPHYLOCOCCUS AUREUS  Few      Narrative:      Lateral ankle 2    Aerobic culture [[id number]]  (Abnormal)  (Susceptibility) Collected: 07/24/20 0904    Order Status: Completed Specimen: Wound from Ankle, Left Updated: 07/26/20 1443     Aerobic Bacterial Culture METHICILLIN RESISTANT STAPHYLOCOCCUS AUREUS  Moderate      Narrative:      Lateral ankle 1    AFB Culture & Smear [[id number]] Collected: 07/24/20 0904    Order Status: Completed Specimen: Wound from Ankle, Left Updated: 07/25/20 2127     AFB Culture & Smear Culture in progress    Narrative:      Lateral ankle 1    AFB Culture & Smear [[id number]] Collected: 07/24/20 0904    Order Status: Completed Specimen: Wound from Ankle, Left Updated: 07/25/20 2127     AFB Culture & Smear Culture in progress    Narrative:      Left medial ankle    AFB Culture & Smear [[id number]] Collected:  07/24/20 0904    Order Status: Completed Specimen: Wound from Ankle, Left Updated: 07/25/20 2127     AFB Culture & Smear Culture in progress    Narrative:      Lateral ankle 2    AFB Culture & Smear [[id number]] Collected: 07/24/20 0905    Order Status: Completed Specimen: Tissue from Ankle, Left Updated: 07/25/20 2127     AFB Culture & Smear Culture in progress    Narrative:      Left ankle lateral tissue    Aerobic culture [[id number]] Collected: 07/24/20 0905    Order Status: Completed Specimen: Skin from Ankle, Left Updated: 07/25/20 0732     Aerobic Bacterial Culture No growth    Narrative:      Left ankle lateral tissue    Gram stain [[id number]] Collected: 07/24/20 0904    Order Status: Completed Specimen: Wound from Ankle, Left Updated: 07/24/20 2041     Gram Stain Result No WBC's      No organisms seen    Narrative:      Left medial ankle    Gram stain [[id number]] Collected: 07/24/20 0905    Order Status: Completed Specimen: Tissue from Ankle, Left Updated: 07/24/20 2032     Gram Stain Result Rare WBC's      No organisms seen    Narrative:      Left ankle lateral tissue    Gram stain [[id number]] Collected: 07/24/20 0904    Order Status: Completed Specimen: Wound from Ankle, Left Updated: 07/24/20 2031     Gram Stain Result No WBC's      No organisms seen    Narrative:      Lateral ankle 1    Gram stain [[id number]] Collected: 07/24/20 0904    Order Status: Completed Specimen: Wound from Ankle, Left Updated: 07/24/20 2027     Gram Stain Result No WBC's      No organisms seen    Narrative:      Lateral ankle 2    Culture, Anaerobe [[id number]] Collected: 07/24/20 0904    Order Status: Sent Specimen: Wound from Ankle, Left Updated: 07/24/20 1350    Fungus culture [[id number]] Collected: 07/24/20 0905    Order Status: Sent Specimen: Tissue from Ankle, Left Updated: 07/24/20 1350    Fungus culture [[id number]] Collected: 07/24/20 0904    Order Status: Sent Specimen: Wound from Ankle, Left Updated: 07/24/20 1350     Culture, Anaerobe [[id number]] Collected: 07/24/20 0904    Order Status: Sent Specimen: Wound from Ankle, Left Updated: 07/24/20 1350    Fungus culture [[id number]] Collected: 07/24/20 0904    Order Status: Sent Specimen: Wound from Ankle, Left Updated: 07/24/20 1350    Fungus culture [[id number]] Collected: 07/24/20 0904    Order Status: Sent Specimen: Wound from Ankle, Left Updated: 07/24/20 1350    Culture, Anaerobe [[id number]] Collected: 07/24/20 0905    Order Status: Sent Specimen: Tissue from Ankle, Left Updated: 07/24/20 1350    Culture, Anaerobe [[id number]] Collected: 07/24/20 0904    Order Status: Sent Specimen: Wound from Ankle, Left Updated: 07/24/20 1358

## 2020-07-26 NOTE — PROGRESS NOTES
[facility] Progress Note    Assessment/Plan:     1. Deep postoperative wound infection  61 yo woman s/p I&D after developing deep wound infection following ORIF for trimalleolar fracture. Hardware retained. ESR 71, CRP 13.9.  - Continue vancomycin, cultures growing S. Aureus, susceptibilities pending.  - Dosing of vanc per pharmacy, trough elevated this morning.   - Follow CBC, BMP daily while inpatient.   - Would also recommend Tdap vaccine if patient is not UTD (none in [facility] system and she is unsure of last vaccination).    Josh Snyder MD  [facility], PGY-4    Thank you for allowing us to participate in the care of this patient. We will continue to follow along. Case has been discussed with consult staff, who is in agreement with assessment and plan.     Subjective:      Taty Kenney is a 60 y.o. female who is being followed by the [facility] service at [facility] for deep postoperative wound infection.     Afebrile, no issues overnight. Vanc level elevated, next dose held and random level ordered. Redosing per pharmacy.        Objective:   Last 24 Hour Vital Signs:  BP  Min: 112/61  Max: 136/68  Temp  Av.6 °F (36.4 °C)  Min: 96.6 °F (35.9 °C)  Max: 98.1 °F (36.7 °C)  Pulse  Av.7  Min: 67  Max: 76  Resp  Av.6  Min: 15  Max: 18  SpO2  Av.2 %  Min: 94 %  Max: 99 %  I/O last 3 completed shifts:  In: 870 [P.O.:370; IV Piggyback:500]  Out: 1050 [Urine:1050]    Physical Exam  Constitutional:       General: She is not in acute distress.     Appearance: Normal appearance. She is not ill-appearing or diaphoretic.   HENT:      Head: Normocephalic and atraumatic.      Nose: Nose normal.      Mouth/Throat:      Mouth: Mucous membranes are moist.      Pharynx: Oropharynx is clear. No oropharyngeal exudate or posterior oropharyngeal erythema.   Eyes:      General: No scleral icterus.     Conjunctiva/sclera: Conjunctivae normal.    Neck:      Musculoskeletal: Normal range of motion and neck supple. No neck rigidity.   Cardiovascular:      Rate and Rhythm: Normal rate and regular rhythm.      Heart sounds: Normal heart sounds.   Pulmonary:      Effort: Pulmonary effort is normal.      Breath sounds: Normal breath sounds.   Abdominal:      General: There is no distension.      Palpations: Abdomen is soft.      Tenderness: There is no abdominal tenderness.   Musculoskeletal:      Comments: Left leg in post-surgical dressing.   Skin:     Coloration: Skin is not jaundiced.      Findings: No lesion or rash.   Neurological:      General: No focal deficit present.      Mental Status: She is alert and oriented to person, place, and time.   Psychiatric:         Mood and Affect: Mood normal.         Behavior: Behavior normal.     Laboratory:  Laboratory Data Reviewed: yes  Pertinent Findings:  Recent Labs   Lab 07/24/20  1013 07/25/20  0344 07/26/20  0529   WBC 12.59  --   --    HGB 11.5*  --   --    HCT 35.2*  --   --      --   --    MCV 95  --   --    RDW 13.3  --   --    NA  --  134* 137   K  --  4.2 4.7   CL  --  101 103   CO2  --  28 27   BUN  --  18 15   CREATININE 0.8 0.8 0.9   GLU  --  110 96   PROT  --  6.6 6.7   ALBUMIN  --  3.1* 3.2*   BILITOT  --  0.2 0.2   AST  --  14 18   ALKPHOS  --  85 81   ALT  --  9* 11         Microbiology Data Reviewed: yes  Pertinent Findings:  Wound cultures (7/24) NGTD, Gram stains with no organisms.    Other Results:  Radiology Data Reviewed: yes  Pertinent Findings:  CXR with appropriate PICC placement, no acute intrathoracic findings.     Current Medications:     Infusions:       Scheduled:   aspirin  81 mg Oral Daily    famotidine  20 mg Oral BID    FLUoxetine  60 mg Oral Daily    HYDROmorphone  1 mg Intravenous Q8H    oxyCODONE-acetaminophen  1 tablet Oral Q6H    propranoloL  20 mg Oral BID    sodium chloride 0.9%  10 mL Intravenous Q6H        PRN:  ibuprofen, ondansetron, Flushing PICC  Protocol **AND** sodium chloride 0.9% **AND** sodium chloride 0.9%, Pharmacy to dose Vancomycin consult **AND** vancomycin - pharmacy to dose    Antibiotics and Day Number of Therapy:  Cefazolin 7/24  Vancomycin 7/24 to current    Lines and Day Number of Therapy:  PICC 7/24 to current

## 2020-07-27 PROBLEM — S82.852A CLOSED TRIMALLEOLAR FRACTURE OF LEFT ANKLE: Status: ACTIVE | Noted: 2020-07-27

## 2020-07-27 LAB
ALBUMIN SERPL BCP-MCNC: 3.2 G/DL (ref 3.5–5.2)
ALP SERPL-CCNC: 81 U/L (ref 55–135)
ALT SERPL W/O P-5'-P-CCNC: 12 U/L (ref 10–44)
ANION GAP SERPL CALC-SCNC: 8 MMOL/L (ref 8–16)
AST SERPL-CCNC: 19 U/L (ref 10–40)
BACTERIA SPEC AEROBE CULT: ABNORMAL
BILIRUB SERPL-MCNC: 0.3 MG/DL (ref 0.1–1)
BUN SERPL-MCNC: 15 MG/DL (ref 8–23)
CALCIUM SERPL-MCNC: 9.3 MG/DL (ref 8.7–10.5)
CHLORIDE SERPL-SCNC: 101 MMOL/L (ref 95–110)
CO2 SERPL-SCNC: 26 MMOL/L (ref 23–29)
CREAT SERPL-MCNC: 1 MG/DL (ref 0.5–1.4)
EST. GFR  (AFRICAN AMERICAN): >60 ML/MIN/1.73 M^2
EST. GFR  (NON AFRICAN AMERICAN): >60 ML/MIN/1.73 M^2
GLUCOSE SERPL-MCNC: 92 MG/DL (ref 70–110)
POTASSIUM SERPL-SCNC: 4.5 MMOL/L (ref 3.5–5.1)
PROT SERPL-MCNC: 6.8 G/DL (ref 6–8.4)
SODIUM SERPL-SCNC: 135 MMOL/L (ref 136–145)

## 2020-07-27 PROCEDURE — 25000003 PHARM REV CODE 250: Performed by: ORTHOPAEDIC SURGERY

## 2020-07-27 PROCEDURE — 11000001 HC ACUTE MED/SURG PRIVATE ROOM

## 2020-07-27 PROCEDURE — 63600175 PHARM REV CODE 636 W HCPCS: Performed by: ORTHOPAEDIC SURGERY

## 2020-07-27 PROCEDURE — 94799 UNLISTED PULMONARY SVC/PX: CPT

## 2020-07-27 PROCEDURE — A4216 STERILE WATER/SALINE, 10 ML: HCPCS | Performed by: ORTHOPAEDIC SURGERY

## 2020-07-27 PROCEDURE — 80053 COMPREHEN METABOLIC PANEL: CPT

## 2020-07-27 PROCEDURE — 97110 THERAPEUTIC EXERCISES: CPT

## 2020-07-27 PROCEDURE — 94761 N-INVAS EAR/PLS OXIMETRY MLT: CPT

## 2020-07-27 PROCEDURE — 63600175 PHARM REV CODE 636 W HCPCS

## 2020-07-27 RX ORDER — CEFEPIME HYDROCHLORIDE 1 G/50ML
2 INJECTION, SOLUTION INTRAVENOUS
Status: DISCONTINUED | OUTPATIENT
Start: 2020-07-27 | End: 2020-07-28

## 2020-07-27 RX ADMIN — IBUPROFEN 400 MG: 400 TABLET, FILM COATED ORAL at 10:07

## 2020-07-27 RX ADMIN — Medication 10 ML: at 05:07

## 2020-07-27 RX ADMIN — OXYCODONE HYDROCHLORIDE AND ACETAMINOPHEN 1 TABLET: 5; 325 TABLET ORAL at 05:07

## 2020-07-27 RX ADMIN — FLUOXETINE HYDROCHLORIDE 60 MG: 20 CAPSULE ORAL at 10:07

## 2020-07-27 RX ADMIN — Medication 10 ML: at 12:07

## 2020-07-27 RX ADMIN — OXYCODONE HYDROCHLORIDE AND ACETAMINOPHEN 1 TABLET: 5; 325 TABLET ORAL at 12:07

## 2020-07-27 RX ADMIN — STANDARDIZED SENNA CONCENTRATE AND DOCUSATE SODIUM 1 TABLET: 8.6; 5 TABLET ORAL at 08:07

## 2020-07-27 RX ADMIN — BISACODYL 10 MG: 10 SUPPOSITORY RECTAL at 05:07

## 2020-07-27 RX ADMIN — VANCOMYCIN HYDROCHLORIDE 1500 MG: 1.5 INJECTION, POWDER, LYOPHILIZED, FOR SOLUTION INTRAVENOUS at 05:07

## 2020-07-27 RX ADMIN — FAMOTIDINE 20 MG: 20 TABLET ORAL at 10:07

## 2020-07-27 RX ADMIN — Medication 10 ML: at 11:07

## 2020-07-27 RX ADMIN — HYDROMORPHONE HYDROCHLORIDE 1 MG: 2 INJECTION, SOLUTION INTRAMUSCULAR; INTRAVENOUS; SUBCUTANEOUS at 05:07

## 2020-07-27 RX ADMIN — OXYCODONE HYDROCHLORIDE AND ACETAMINOPHEN 1 TABLET: 5; 325 TABLET ORAL at 06:07

## 2020-07-27 RX ADMIN — PROPRANOLOL HYDROCHLORIDE 20 MG: 10 TABLET ORAL at 08:07

## 2020-07-27 RX ADMIN — ASPIRIN 81 MG: 81 TABLET, COATED ORAL at 10:07

## 2020-07-27 RX ADMIN — CEFEPIME HYDROCHLORIDE 2 G: 2 INJECTION, SOLUTION INTRAVENOUS at 01:07

## 2020-07-27 RX ADMIN — PROPRANOLOL HYDROCHLORIDE 20 MG: 10 TABLET ORAL at 10:07

## 2020-07-27 RX ADMIN — OXYCODONE HYDROCHLORIDE AND ACETAMINOPHEN 1 TABLET: 5; 325 TABLET ORAL at 01:07

## 2020-07-27 RX ADMIN — FAMOTIDINE 20 MG: 20 TABLET ORAL at 08:07

## 2020-07-27 RX ADMIN — STANDARDIZED SENNA CONCENTRATE AND DOCUSATE SODIUM 1 TABLET: 8.6; 5 TABLET ORAL at 10:07

## 2020-07-27 RX ADMIN — CEFEPIME HYDROCHLORIDE 2 G: 2 INJECTION, SOLUTION INTRAVENOUS at 11:07

## 2020-07-27 RX ADMIN — Medication 10 ML: at 01:07

## 2020-07-27 RX ADMIN — OXYCODONE HYDROCHLORIDE AND ACETAMINOPHEN 1 TABLET: 5; 325 TABLET ORAL at 11:07

## 2020-07-27 NOTE — PROGRESS NOTES
[facility] Progress Note    Assessment/Plan:     1. Deep postoperative wound infection  59 yo woman s/p I&D after developing deep wound infection following ORIF for trimalleolar fracture. Hardware retained. ESR 71, CRP 13.9.   - Wound cultures growing MRSA as well as GNR (identification pending).  - Cefepime added to vancomycin, will follow susceptibilities. Would not recommend discharge until we have final identification and susceptibilities of the GNR.   - Recommend 6 weeks of therapy from date of washout as patient's hardware is retained. Last day of therapy .  - Follow CBC, BMP daily while inpatient.   - Would also recommend Tdap vaccine if patient is not UTD (none in [facility] system and she is unsure of last vaccination).    Josh Snyder MD  [facility], PGY-4    Thank you for allowing us to participate in the care of this patient. We will continue to follow along. Case has been discussed with consult staff, who is in agreement with assessment and plan.     Subjective:      Taty Kenney is a 60 y.o. female who is being followed by the [facility] service at [facility] for deep postoperative wound infection.     No events overnight, afebrile. Cultures now with GNR in addition to MRSA.        Objective:   Last 24 Hour Vital Signs:  BP  Min: 105/58  Max: 150/73  Temp  Av °F (36.7 °C)  Min: 97.5 °F (36.4 °C)  Max: 98.5 °F (36.9 °C)  Pulse  Av  Min: 69  Max: 90  Resp  Av.1  Min: 16  Max: 33  SpO2  Av.6 %  Min: 93 %  Max: 96 %  Weight  Av.9 kg (204 lb 12.9 oz)  Min: 92.9 kg (204 lb 12.9 oz)  Max: 92.9 kg (204 lb 12.9 oz)  I/O last 3 completed shifts:  In: 845 [P.O.:595; IV Piggyback:250]  Out: 1350 [Urine:1350]    Physical Exam  Constitutional:       General: She is not in acute distress.     Appearance: Normal appearance. She is not ill-appearing or diaphoretic.   HENT:      Head: Normocephalic and atraumatic.       Nose: Nose normal.      Mouth/Throat:      Mouth: Mucous membranes are moist.      Pharynx: Oropharynx is clear. No oropharyngeal exudate or posterior oropharyngeal erythema.   Eyes:      General: No scleral icterus.     Conjunctiva/sclera: Conjunctivae normal.   Neck:      Musculoskeletal: Normal range of motion and neck supple. No neck rigidity.   Cardiovascular:      Rate and Rhythm: Normal rate and regular rhythm.      Heart sounds: Normal heart sounds.   Pulmonary:      Effort: Pulmonary effort is normal.      Breath sounds: Normal breath sounds.   Abdominal:      General: There is no distension.      Palpations: Abdomen is soft.      Tenderness: There is no abdominal tenderness.   Musculoskeletal:      Comments: Left leg in post-surgical dressing.   Skin:     Coloration: Skin is not jaundiced.      Findings: No lesion or rash.   Neurological:      General: No focal deficit present.      Mental Status: She is alert and oriented to person, place, and time.   Psychiatric:         Mood and Affect: Mood normal.         Behavior: Behavior normal.     Laboratory:  Laboratory Data Reviewed: yes  Pertinent Findings:  Recent Labs   Lab 07/24/20  1013 07/25/20  0344 07/26/20  0529 07/27/20  0553   WBC 12.59  --   --   --    HGB 11.5*  --   --   --    HCT 35.2*  --   --   --      --   --   --    MCV 95  --   --   --    RDW 13.3  --   --   --    NA  --  134* 137 135*   K  --  4.2 4.7 4.5   CL  --  101 103 101   CO2  --  28 27 26   BUN  --  18 15 15   CREATININE 0.8 0.8 0.9 1.0   GLU  --  110 96 92   PROT  --  6.6 6.7 6.8   ALBUMIN  --  3.1* 3.2* 3.2*   BILITOT  --  0.2 0.2 0.3   AST  --  14 18 19   ALKPHOS  --  85 81 81   ALT  --  9* 11 12         Microbiology Data Reviewed: yes  Pertinent Findings:  Wound cultures (7/24) MRSA, GNR in aerobic cultures.     Other Results:  Radiology Data Reviewed: yes  Pertinent Findings:  CXR with appropriate PICC placement, no acute intrathoracic findings.     Current  Medications:     Infusions:       Scheduled:   aspirin  81 mg Oral Daily    ceFEPime (MAXIPIME) IVPB  2 g Intravenous Q12H    famotidine  20 mg Oral BID    FLUoxetine  60 mg Oral Daily    oxyCODONE-acetaminophen  1 tablet Oral Q6H    propranoloL  20 mg Oral BID    senna-docusate 8.6-50 mg  1 tablet Oral BID    sodium chloride 0.9%  10 mL Intravenous Q6H    vancomycin (VANCOCIN) IVPB  1,500 mg Intravenous Q24H        PRN:  bisacodyL, ibuprofen, ondansetron, Flushing PICC Protocol **AND** sodium chloride 0.9% **AND** sodium chloride 0.9%, DIPH,PERTUS (ADACEL),TETANUS PF VAC (ADULT), Pharmacy to dose Vancomycin consult **AND** vancomycin - pharmacy to dose    Antibiotics and Day Number of Therapy:  Cefazolin 7/24  Vancomycin 7/24 to current  Cefepime 7/27 - current    Lines and Day Number of Therapy:  PICC 7/24 to current

## 2020-07-27 NOTE — PLAN OF CARE
Plan of care reviewed with patient. Verbal reported of understanding. AAOx4. Patient stable. Due meds given per ordered. Wound vac in place to left ankle . Encouraged to turned every two hours as tolerated. No falls throughout the shift. Safety maintained. No acute distress noted. Bed in lowest position. Bed alarm on. Head of bed elevated. Side rails x 2 are up. Call bell within reach. Patient will be monitored overnight.

## 2020-07-27 NOTE — OP NOTE
Operative Report    LUBA JAVIER  : 1960  MRN: [id number]    Date of Procedure: 2020     Pre-op Diagnosis:     Infection following procedure, deep, left medial and lateral ankle     Left trimalleolar ankle fracture    Post-op Diagnosis:    Same    Procedure:   Incision and drainage of complex postoperative wound infection (78605)   Application of incisional wound vac (44896)     Surgeon: Triston Naidu IV, MD     Assisting Surgeon:    Carlos Ibrahim MD and Rick Johnson MD     Anesthesiologist:  See record    Anesthesia:    general    Estimated Blood Loss: 25cc         Specimens: none    Indications for surgery:   Luba Javier is a 60 y.o. female patient who was seen in the clinic after an ankle fracture surgery on 2020 complaining of foul smelling drainage.  She failed treatment with oral antibiotics in the outpatient setting before we decided to return to the OR for washout.    The patient was oriented as to the diagnosis of post operative wound infection and the treatment options.  The risks of surgery were discussed including, but not limited to, persistent infection, bleeding, nerve and vessel injury, ongoing pain, and need for further surgery.  We also discussed the possibility of removing the internal fixation hardware and placing an external fixation device versus leaving the hardware in place if it was stable and well fixed.  After these discussions, the patient agreed to proceed with surgery.     Description of procedure:   Prior to the surgical procedure, the patient was identified in the preoperative holding area.  We had a thorough discussion about the risks and benefits of surgery including the expected post operative protocol.  The patient signed an informed consent and the operative extremity was marked.  The patient was taken to the operating room and positioned on the table in the supine position.  After general anesthesia was induced, a well padded thigh tourniquet was  placed but not inflated.  The patient was subsequently prepped with betadine and draped in the usual orthopaedic sterile fashion.  A surgical timeout was performed confirming the surgical site and procedure prior to proceeding.         Fluoroscopy was used to assess the position of the hardware and reduction of the fracture.  The fracture was reduced and the hardware was well positioned.  The wounds were examined and were found to have purulent discharge from the center of the medial and lateral ankle wounds.  There was erythema surrounding the wounds that did not track up the leg or into the foot.  There was friable skin at the region of the incision where discharge was leaking out.      (75418) The sutures were removed from the lateral and medial ankle wounds. A 12 cm incision was made over the lateral ankle at the prior surgical site.   Purulent material was expressed; tissue and swab cultures were taken.  Necrotic tissue was debrided and sharply excised from the wound edges and from the surrounding tissues using a combination of scalpel and rongeur.  The hardware and distal fibular fracture were assessed and found to be well fixed and stable, thus hardware was retained.  Six liters of normal saline was used with pulse  to thoroughly irrigate the wound leaving healthy appearing tissue.      Attention was now turned toward the medial wound, where a 7cm incision was made over the medial ankle at the prior surgical site.  Purulent material was expressed; cultures were taken.  Necrotic tissue was debrided and sharply excised from the wound edges and from the surrounding tissue using a combination of scalpel and rongeur.  The hardware was assessed and found to be well fixed, thus it was retained.  Six liters of normal saline was used with pulse  to thoroughly irrigate the wound leaving healthy appearing tissue.      (52642) Next, a wound closure was performed on the medial and lateral wounds with  interrupted sutures using 0 Prolene.  Finally, an incisional wound vac was placed over the medial and lateral wounds.      The extremity was placed in a plaster posterior slab splint.  The anesthetic was reversed and the patient was extubated in the operating room without difficulty.  The patient was taken to the PACU in good condition.  All counts were correct at the end of the case.       Post-Operative Management:  The patient will be NWB on the operative extremity.  We will consult infectious disease service for assistance with antibiotics and await culture data.  The patient will have a PICC line placed today in anticipation of IV antibiotics.  After discharge, the patient will follow up in my office ([phone]) in 2 weeks after surgery.       Complications: No     Condition: Good     Disposition: PACU - hemodynamically stable.     Attestation: I was present and scrubbed for the entire procedure.    Implants:   * No implants in log *

## 2020-07-27 NOTE — PLAN OF CARE
Problem: Fall Injury Risk  Goal: Absence of Fall and Fall-Related Injury  Outcome: Ongoing, Progressing    Pt. Remained free of falls during the shift.      Problem: Infection  Goal: Infection Symptom Resolution  Outcome: Ongoing, Progressing    Contact isolation yellow bag elliott in place over door. Sign stating contact precautions on door.              Problem: Bowel Elimination Impaired (Orthopaedic Fracture)  Goal: Effective Bowel Elimination  Outcome: Ongoing, Progressing    Pt. Used bedside commode for bowel movement.

## 2020-07-27 NOTE — PLAN OF CARE
Reviewed notes, labs, and orders of the past 24 hours.  Care plan reviewed.  Will continue to monitor.

## 2020-07-27 NOTE — PROGRESS NOTES
[facility] Progress Note     Orthopaedic Surgeries:  60F s/p L faustina fx I&D on 7/24/20 for postop wound infection and incisional wound vac placement     S:   NAEO, VSS, resting comfortably, pain well controlled, wound vac in place to good suction, minimal output. Pt has some constipation.  Denies CP, SOB, dizziness.     O:   PE:  Gen: A+Ox3, NAD  Card: RRR by RP  Lungs: nonlabored breathing, symmetric chest rise  Abd: S/NT/ND  LLE  Short leg splint in place  Removed, wounds well approximated  No drainage or purulence or fluctuance  Mild erythema around skin edges  Some numbness over dorsum of foot  Wound vac in place to good suction  Toes warm and well perfused  EHL/FHL 5/5     Cultures: NGTD     A/P:  60F s/p L faustina fx ORIF 7/7/20, with I&D on 7/24/20 for postop wound infection and incisional wound vac placement     -admitted as inpatient for IV abx and infectious disease evaluation  -cultures growing Staph, sensitivities resulted  -ID recommending broad spectrum vanc at this time until cultures result  -Vanc trough high yesterday, will recheck this afternoon for home IV abx dosage  -incisional wound vac in place to L medial and lateral mal, minimal output, will plan changes Q2 days  -started on bowel regimen  -home wound vac delivered, explained to pt instructions  -PICC line placed  -PO pain control at this time  -regular diet  -DVT ppx Aspirin PO  -NWB LLE  -follow up in clinic w Dr Naidu in 2 weeks  ?  DC Dispo:   1. pending ID abx recs after vanc trough drawn later today  2. PT will need home health PICC IV abx  3. also wound vac changes by wound care     Dr. Maria Elena Johnson, PGY3  [facility]  Pager: [phone]
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
## 2020-07-27 NOTE — PLAN OF CARE
Progressing toward goals; cont POC    Problem: Occupational Therapy Goal  Goal: Occupational Therapy Goal  Description: Goals to be met by: 8/26     Patient will increase functional independence with ADLs by performing:    UE Dressing with Set-up Assistance.  LE Dressing with Minimal Assistance.  Grooming while seated with Modified [place].  Toileting from bedside commode with Supervision for hygiene and clothing management.   Toilet transfer to bedside commode with Supervision.  Upper extremity dumbbell exercise program x10 reps per handout, with independence.--met 7/27    Outcome: Ongoing, Progressing

## 2020-07-27 NOTE — PLAN OF CARE
pt was due to d/c to home today with wound vac and iv abx per [facility]   d/c on hold as pt may need an additional iv abx - cx p     TN called and notified Daniel --- for probable d/c to home tomorrow  -        07/27/20 1126   Discharge Reassessment   Assessment Type Discharge Planning Reassessment

## 2020-07-27 NOTE — PLAN OF CARE
Problem: Adult Inpatient Plan of Care  Goal: Plan of Care Review  Outcome: Ongoing, Progressing     Pt. AAO *4. Pain was controlled with oral medications. Tolerating diet well. Awaiting pt disposition . Picc line and wound vac in place. Purple port was a  little sluggish when flushed.

## 2020-07-27 NOTE — PT/OT/SLP PROGRESS
Occupational Therapy   Treatment    Name: Taty Kenney  MRN: [id number]  Admitting Diagnosis:  Deep postoperative wound infection  3 Days Post-Op    Recommendations:     Discharge Recommendations: home health PT, home health OT  Discharge Equipment Recommendations:  none  Barriers to discharge:  None    Assessment:     Taty Kenney is a 60 y.o. female with a medical diagnosis of Deep postoperative wound infection.  She presents with performance deficits affecting function are weakness, impaired endurance, impaired self care skills, impaired functional mobilty, gait instability, impaired balance, decreased lower extremity function, pain, decreased safety awareness, decreased ROM, orthopedic precautions.     Rehab Prognosis:  Good; patient would benefit from acute skilled OT services to address these deficits and reach maximum level of function.       Plan:     Patient to be seen 5 x/week to address the above listed problems via self-care/home management, therapeutic activities, therapeutic exercises  · Plan of Care Expires: 08/26/20  · Plan of Care Reviewed with: patient    Subjective     Pain/Comfort:  · Pain Rating 1: 4/10  · Location - Side 1: Left  · Location - Orientation 1: generalized  · Location 1: ankle  · Pain Addressed 1: Reposition, Distraction, Cessation of Activity  · Pain Rating Post-Intervention 1: 4/10    Objective:     Communicated with: nurse prior to session.  Patient found HOB elevated with PICC line, wound vac, telemetry, SCD upon OT entry to room.    General Precautions: Standard, fall   Orthopedic Precautions:LLE non weight bearing   Braces:       Occupational Performance:     Bed Mobility:    · Patient completed Rolling/Turning to Left with  modified independence  · Patient completed Rolling/Turning to Right with modified independence  · Patient completed Scooting/Bridging with modified independence  · Patient completed Supine to Sit with modified independence  · Patient completed  Sit to Supine with modified independence     Functional Mobility/Transfers:  · Pt declined  · Functional Mobility: NT    Activities of Daily Living:  · Toileting: minimum assistance bedpan      AMPAC 6 Click ADL: 19    Treatment & Education:  Pt given written copy of HEP; reviewed.  Educated on importance of maintaining NWB LLE even in bed during bed mobility, benefits of EOB/OOB while maintaining NWB    Patient left HOB elevated with all lines intact, call button in reach and bed alarm onEducation:      GOALS:   Multidisciplinary Problems     Occupational Therapy Goals        Problem: Occupational Therapy Goal    Goal Priority Disciplines Outcome Interventions   Occupational Therapy Goal     OT, PT/OT Ongoing, Progressing    Description: Goals to be met by: 8/26     Patient will increase functional independence with ADLs by performing:    UE Dressing with Set-up Assistance.  LE Dressing with Minimal Assistance.  Grooming while seated with Modified [place].  Toileting from bedside commode with Supervision for hygiene and clothing management.   Toilet transfer to bedside commode with Supervision.  Upper extremity dumbbell exercise program x10 reps per handout, with independence.                     Time Tracking:     OT Date of Treatment: 07/27/20  OT Start Time: 0913  OT Stop Time: 0923  OT Total Time (min): 10 min    Billable Minutes:Therapeutic Exercise 10    Andrés Tobar OT  7/27/2020

## 2020-07-28 ENCOUNTER — TELEPHONE (OUTPATIENT)
Dept: FAMILY MEDICINE | Facility: HOSPITAL | Age: 60
End: 2020-07-28

## 2020-07-28 LAB
ALBUMIN SERPL BCP-MCNC: 3.2 G/DL (ref 3.5–5.2)
ALP SERPL-CCNC: 81 U/L (ref 55–135)
ALT SERPL W/O P-5'-P-CCNC: 10 U/L (ref 10–44)
ANION GAP SERPL CALC-SCNC: 8 MMOL/L (ref 8–16)
AST SERPL-CCNC: 17 U/L (ref 10–40)
BACTERIA SPEC AEROBE CULT: ABNORMAL
BACTERIA SPEC ANAEROBE CULT: NORMAL
BASOPHILS # BLD AUTO: 0.04 K/UL (ref 0–0.2)
BASOPHILS NFR BLD: 0.4 % (ref 0–1.9)
BILIRUB SERPL-MCNC: 0.4 MG/DL (ref 0.1–1)
BUN SERPL-MCNC: 14 MG/DL (ref 8–23)
CALCIUM SERPL-MCNC: 9.2 MG/DL (ref 8.7–10.5)
CHLORIDE SERPL-SCNC: 100 MMOL/L (ref 95–110)
CO2 SERPL-SCNC: 25 MMOL/L (ref 23–29)
CREAT SERPL-MCNC: 0.8 MG/DL (ref 0.5–1.4)
DIFFERENTIAL METHOD: ABNORMAL
EOSINOPHIL # BLD AUTO: 0.4 K/UL (ref 0–0.5)
EOSINOPHIL NFR BLD: 3.9 % (ref 0–8)
ERYTHROCYTE [DISTWIDTH] IN BLOOD BY AUTOMATED COUNT: 13.2 % (ref 11.5–14.5)
EST. GFR  (AFRICAN AMERICAN): >60 ML/MIN/1.73 M^2
EST. GFR  (NON AFRICAN AMERICAN): >60 ML/MIN/1.73 M^2
GLUCOSE SERPL-MCNC: 101 MG/DL (ref 70–110)
HCT VFR BLD AUTO: 29.9 % (ref 37–48.5)
HGB BLD-MCNC: 10 G/DL (ref 12–16)
IMM GRANULOCYTES # BLD AUTO: 0.03 K/UL (ref 0–0.04)
IMM GRANULOCYTES NFR BLD AUTO: 0.3 % (ref 0–0.5)
LYMPHOCYTES # BLD AUTO: 1.9 K/UL (ref 1–4.8)
LYMPHOCYTES NFR BLD: 19.5 % (ref 18–48)
MCH RBC QN AUTO: 30.9 PG (ref 27–31)
MCHC RBC AUTO-ENTMCNC: 33.4 G/DL (ref 32–36)
MCV RBC AUTO: 92 FL (ref 82–98)
MONOCYTES # BLD AUTO: 0.8 K/UL (ref 0.3–1)
MONOCYTES NFR BLD: 8 % (ref 4–15)
NEUTROPHILS # BLD AUTO: 6.5 K/UL (ref 1.8–7.7)
NEUTROPHILS NFR BLD: 67.9 % (ref 38–73)
NRBC BLD-RTO: 0 /100 WBC
PLATELET # BLD AUTO: 240 K/UL (ref 150–350)
PMV BLD AUTO: 11.7 FL (ref 9.2–12.9)
POTASSIUM SERPL-SCNC: 4.3 MMOL/L (ref 3.5–5.1)
PROT SERPL-MCNC: 6.7 G/DL (ref 6–8.4)
RBC # BLD AUTO: 3.24 M/UL (ref 4–5.4)
SODIUM SERPL-SCNC: 133 MMOL/L (ref 136–145)
VANCOMYCIN TROUGH SERPL-MCNC: 12.3 UG/ML (ref 10–22)
WBC # BLD AUTO: 9.53 K/UL (ref 3.9–12.7)

## 2020-07-28 PROCEDURE — 80053 COMPREHEN METABOLIC PANEL: CPT

## 2020-07-28 PROCEDURE — 85025 COMPLETE CBC W/AUTO DIFF WBC: CPT

## 2020-07-28 PROCEDURE — 99900035 HC TECH TIME PER 15 MIN (STAT)

## 2020-07-28 PROCEDURE — 25000003 PHARM REV CODE 250: Performed by: ORTHOPAEDIC SURGERY

## 2020-07-28 PROCEDURE — 94799 UNLISTED PULMONARY SVC/PX: CPT

## 2020-07-28 PROCEDURE — 94761 N-INVAS EAR/PLS OXIMETRY MLT: CPT

## 2020-07-28 PROCEDURE — 63600175 PHARM REV CODE 636 W HCPCS: Performed by: ORTHOPAEDIC SURGERY

## 2020-07-28 PROCEDURE — 80202 ASSAY OF VANCOMYCIN: CPT

## 2020-07-28 PROCEDURE — A4216 STERILE WATER/SALINE, 10 ML: HCPCS | Performed by: ORTHOPAEDIC SURGERY

## 2020-07-28 PROCEDURE — 11000001 HC ACUTE MED/SURG PRIVATE ROOM

## 2020-07-28 RX ORDER — ALPRAZOLAM 0.25 MG/1
1 TABLET ORAL DAILY PRN
Status: DISCONTINUED | OUTPATIENT
Start: 2020-07-28 | End: 2020-07-29 | Stop reason: HOSPADM

## 2020-07-28 RX ORDER — CEFEPIME HYDROCHLORIDE 1 G/50ML
2 INJECTION, SOLUTION INTRAVENOUS
Status: DISCONTINUED | OUTPATIENT
Start: 2020-07-28 | End: 2020-07-29 | Stop reason: HOSPADM

## 2020-07-28 RX ADMIN — IBUPROFEN 400 MG: 400 TABLET, FILM COATED ORAL at 04:07

## 2020-07-28 RX ADMIN — VANCOMYCIN HYDROCHLORIDE 1500 MG: 1.5 INJECTION, POWDER, LYOPHILIZED, FOR SOLUTION INTRAVENOUS at 04:07

## 2020-07-28 RX ADMIN — CEFEPIME HYDROCHLORIDE 2 G: 2 INJECTION, SOLUTION INTRAVENOUS at 12:07

## 2020-07-28 RX ADMIN — FAMOTIDINE 20 MG: 20 TABLET ORAL at 08:07

## 2020-07-28 RX ADMIN — Medication 10 ML: at 12:07

## 2020-07-28 RX ADMIN — STANDARDIZED SENNA CONCENTRATE AND DOCUSATE SODIUM 1 TABLET: 8.6; 5 TABLET ORAL at 08:07

## 2020-07-28 RX ADMIN — ALPRAZOLAM 1 MG: 0.25 TABLET ORAL at 12:07

## 2020-07-28 RX ADMIN — OXYCODONE HYDROCHLORIDE AND ACETAMINOPHEN 1 TABLET: 5; 325 TABLET ORAL at 12:07

## 2020-07-28 RX ADMIN — FLUOXETINE HYDROCHLORIDE 60 MG: 20 CAPSULE ORAL at 08:07

## 2020-07-28 RX ADMIN — ASPIRIN 81 MG: 81 TABLET, COATED ORAL at 08:07

## 2020-07-28 RX ADMIN — OXYCODONE HYDROCHLORIDE AND ACETAMINOPHEN 1 TABLET: 5; 325 TABLET ORAL at 06:07

## 2020-07-28 RX ADMIN — IBUPROFEN 400 MG: 400 TABLET, FILM COATED ORAL at 08:07

## 2020-07-28 RX ADMIN — Medication 10 ML: at 11:07

## 2020-07-28 RX ADMIN — OXYCODONE HYDROCHLORIDE AND ACETAMINOPHEN 1 TABLET: 5; 325 TABLET ORAL at 11:07

## 2020-07-28 RX ADMIN — IBUPROFEN 400 MG: 400 TABLET, FILM COATED ORAL at 02:07

## 2020-07-28 RX ADMIN — CEFEPIME HYDROCHLORIDE 2 G: 2 INJECTION, SOLUTION INTRAVENOUS at 08:07

## 2020-07-28 RX ADMIN — PROPRANOLOL HYDROCHLORIDE 20 MG: 10 TABLET ORAL at 08:07

## 2020-07-28 RX ADMIN — Medication 10 ML: at 06:07
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
## 2020-07-28 NOTE — PLAN OF CARE
[facility]    HOME HEALTH ORDERS  FACE TO FACE ENCOUNTER    Patient Name: Taty Kenney  YOB: 1960    PCP: Meggan Palafox NP   PCP Address: [address] [facility] / [place] [address]  PCP Phone Number: [phone]  PCP Fax: [phone]    Encounter Date: 07/28/2020    Admit to Home Health    Diagnoses:  Active Hospital Problems    Diagnosis  POA    *Deep postoperative wound infection [T81.42XA]  Yes    MRSA (methicillin resistant Staphylococcus aureus) infection [A49.02]  Unknown    Infection due to Enterobacter cloacae [A49.8]  Unknown    Closed trimalleolar fracture of left ankle [S82.852A]  Yes      Resolved Hospital Problems   No resolved problems to display.       Future Appointments   Date Time Provider Department Center   7/30/2020  1:00 PM Columba Reid DPM [facility] WOUND [facility]   8/12/2020  9:20 AM Vijay Koch PA-C [facility]   8/18/2020 10:00 AM Kilo Cade MD [facility] ID [place]     Follow-up Information     [facility] On 8/12/2020.    Specialty: Family Medicine  Why: 9:20 am    PCP/hospital follow up   Contact information:  [address], [address]  [facility] [address] [phone]  Additional information:  At this time [facility] will only use these entries Main Hospital, [facility], and Emergency Department due to COVID-19 precautions.            Triston Naidu IV, MD On 8/5/2020.    Specialty: Orthopedic Surgery  Why: 10:00 am      Contact information:  [address]  [address]  [facility] [address]  [phone]             [facility].    Specialty: DME Provider  Why: Provider of Wound Vac  Contact information:  [address] [place] [address]  [phone]             [facility].    Specialties: Pharmacist, DME Provider, IV Infusion  Why: (AKA- [facility]) Provider of IV Abx  Contact information:  [address]  [place] [address]  [phone]             [facility] On 7/30/2020.    Specialty: Wound Care  Why: 1:00 pm   - wound care clinic   --- you will see Dr. Reid      Contact information:  [address] [place] [id number] [phone]  Additional information:  1st Floor MOB   At this time [facility] will only use these entries Main Hospital, [facility], and Emergency Department due to COVID-19 precautions.            Kilo Cade MD On 8/18/2020.    Specialty: Infectious Diseases  Why: 10:00 am     Infectious Disease    [facility] -- 1st floor [facility]     Contact information:  [address]  [facility] [address]  [phone]                     I have seen and examined this patient face to face today. My clinical findings that support the need for the home health skilled services and home bound status are the following:  Patient with medication mismanagement issues requiring home bound status as evidenced by  Poor adherence to medication regimen/dosage.    Allergies:  Review of patient's allergies indicates:   Allergen Reactions    Augmentin [amoxicillin-pot clavulanate] Other (See Comments)     Stomach cramps       Diet: regular diet    Activities: NWB LLE    Nursing:   SN to complete comprehensive assessment including routine vital signs. Instruct on disease process and s/s of complications to report to MD. Review/verify medication list sent home with the patient at time of discharge  and instruct patient/caregiver as needed. Frequency may be adjusted depending on start of care date.    Notify MD if SBP > 160 or < 90; DBP > 90 or < 50; HR > 120 or < 50; Temp > 101;       CONSULTS:    none    MISCELLANEOUS CARE:  Home Infusion Therapy:   SN to perform Infusion Therapy/Central Line Care.  Review Central Line Care & Central Line Flush with patient.  Pt has PICC line in place    Will need 6 weeks of IV ABX  Vancomycin 1500 mg  daily IV  Cefepime 2 g q8hrs IV  End date 9/4/20  Last dose given 7/28/20    Weekly ESR, CRP, CMP, CBC, and vancomycin trough levels  To be sent to Infectious Disease Dr Bynum via fax [phone] [address] [facility] [address]  [phone]      Scrub the Hub: Prior to accessing the line, always perform a 30 second alcohol scrub  Each lumen of the central line is to be flushed at least daily with 10 mL Normal Saline and 3 mL Heparin flush (10 units/mL)  Skilled Nurse (SN) may draw blood from IV access  Blood Draw Procedure:   - Aspirate at least 5 mL of blood   - Discard   - Obtain specimen   - Change injection cap   - Flush with 20 mL Normal Saline followed by a                 3-5 mL Heparin flush (10 units/mL)  Central :   - Sterile dressing changes are done weekly and as needed.   - Use chlor-hexadine scrub to cleanse site, apply Biopatch to insertion site,       apply securement device dressing   - Injection caps are changed weekly and after EVERY lab draw.   - If sterile gauze is under dressing to control oozing,                 dressing change must be performed every 24 hours until gauze is not needed.    WOUND CARE ORDERS  Pt has Prevena dressing, no changes necessary      Medications: Review discharge medications with patient and family and provide education.      Current Discharge Medication List      START taking these medications    Details   dextrose 5 % SolP 50 mL with ceFEPIme 1 gram SolR 2 g Inject 2 g into the vein every 8 (eight) hours.  Qty: 42 Container, Refills: 0      oxyCODONE-acetaminophen (PERCOCET) 5-325 mg per tablet Take 1 tablet by mouth every 6 (six) hours as needed for Pain.  Qty: 30 tablet, Refills: 0      VANCOMYCIN 500 MG/100 ML D5W (READY TO MIX SYSTEM) 500 mg, VANCOMYCIN 1 G/250 ML D5W (READY TO MIX SYSTEM) 1,000 mg Inject 1,500 mg into the vein once daily.  Qty: 1500 Units, Refills: 0         CONTINUE these medications which have CHANGED     Details   !! aspirin (ECOTRIN) 81 MG EC tablet Take 1 tablet (81 mg total) by mouth once daily.  Qty: 42 tablet, Refills: 0      !! aspirin (ECOTRIN) 81 MG EC tablet Take 1 tablet (81 mg total) by mouth 2 (two) times a day.  Qty: 84 tablet, Refills: 0       !! - Potential duplicate medications found. Please discuss with provider.      CONTINUE these medications which have NOT CHANGED    Details   alprazolam (XANAX) 0.5 MG tablet Take 0.5 mg by mouth 3 (three) times daily.      !! FLUoxetine 20 MG capsule Take 60 mg by mouth once daily.      meclizine (ANTIVERT) 32 MG tablet Take 32 mg by mouth 3 (three) times daily as needed.      propranoloL (INDERAL) 40 MG tablet Take 20 mg by mouth 2 (two) times daily.     Comments: .      QUEtiapine (SEROQUEL XR) 300 MG Tb24 Take 300 mg by mouth every evening.      traZODone (DESYREL) 50 MG tablet Take 100 mg by mouth nightly as needed for Insomnia.      !! FLUoxetine 10 MG capsule Take 30 mg by mouth once daily.       !! - Potential duplicate medications found. Please discuss with provider.          I certify that this patient is confined to her home and needs intermittent skilled nursing care.      Dr. Maria Elena Johnson, PGY3  [facility]  Pager: [phone]

## 2020-07-28 NOTE — PROGRESS NOTES
Pharmacist Renal Dose Adjustment Note    Taty Kenney is a 60 y.o. female being treated with the medication cefepime    Patient Data:    Vital Signs (Most Recent):  Temp: 97.9 °F (36.6 °C) (07/28/20 0750)  Pulse: 75 (07/28/20 0810)  Resp: 17 (07/28/20 0810)  BP: 125/69 (07/28/20 0750)  SpO2: 96 % (07/28/20 0810) Vital Signs (72h Range):  Temp:  [96.6 °F (35.9 °C)-98.6 °F (37 °C)]   Pulse:  [67-90]   Resp:  [14-33]   BP: (105-168)/(58-88)   SpO2:  [91 %-99 %]      Recent Labs   Lab 07/26/20  0529 07/27/20  0553 07/28/20  0530   CREATININE 0.9 1.0 0.8     Serum creatinine: 0.8 mg/dL 07/28/20 0530  Estimated creatinine clearance: 85 mL/min    Medication:cefepime dose: 2gram frequency q12h will be changed to medication:cefepime dose:2gram frequency:q8h    Pharmacist's Name: Scottie Vasquez  Pharmacist's Extension: [phone]

## 2020-07-28 NOTE — PLAN OF CARE
Patient A&Ox4. On contact isolation for MRA on wound. With intact MELVI PICC. On Iv antibiotics. Md changed woundvac and dressing. Vanc trough drawn and sent to lab. Pain meds given as requested. All due meds given. Bed alarm on. Call light within reach.

## 2020-07-28 NOTE — PROGRESS NOTES
[facility] Progress Note     Orthopaedic Surgeries:  60F s/p L faustina fx I&D on 7/24/20 for postop wound infection and incisional wound vac placement     S:   NAEO, VSS, resting comfortably, pain well controlled, wound vac in place to good suction, minimal output.   Denies CP, SOB, dizziness.     O:   PE:  Gen: A+Ox3, NAD  Card: RRR by RP  Lungs: nonlabored breathing, symmetric chest rise  Abd: S/NT/ND  LLE  Short leg splint in place  Removed, wounds well approximated  No drainage or purulence or fluctuance  Mild erythema around skin edges  Some numbness over dorsum of foot  Wound vac in place to good suction  Toes warm and well perfused  EHL/FHL 5/5     Cultures: NGTD     A/P:  60F s/p L faustina fx ORIF 7/7/20, with I&D on 7/24/20 for postop wound infection and incisional wound vac placement     -admitted as inpatient for IV abx and infectious disease evaluation  -cultures growing MRSA and Enterobacter cloacae, sensitivities resulted  -ID recommending broad spectrum vanc + cefepime at this time until cultures result  -vanc dose 1500 mg daily for 6 weeks postop  -incisional wound vac in place to L medial and lateral mal, minimal output, will plan changes Q2 days  -started on bowel regimen  -home wound vac delivered, explained to pt instructions  -PICC line placed  -PO pain control at this time  -regular diet  -DVT ppx Aspirin PO  -NWB LLE  -follow up in clinic w Dr Naidu in 2 weeks  ?  DC Dispo:   1. pending ID abx recs   2. PT will need home health PICC IV abx  3. also wound vac changes by wound care     Dr. Maria Elena Johnson, PGY3  [facility]  Pager: [phone]

## 2020-07-28 NOTE — PLAN OF CARE
Patient is AAO X 4. Vital signs stable. Remains on IV antibiotics and a wound vac. Uses bedside commode with assistance from one person. Scheduled Percocet given for pain with good effect. Slept well during night. Safety measures maintained. Will continue to monitor patient.

## 2020-07-28 NOTE — PLAN OF CARE
ORTHOPEDIC SURGERY PLAN OF CARE    Final abx recommendations per Infectious Disease:    Will need 6 weeks of IV ABX  Vancomycin 1750 mg daily IV  Cefepime 2 g q8hrs IV  End date 9/4/20  Last dose given 7/28/20     Weekly ESR, CRP, CMP, CBC, and vancomycin trough levels  To be sent to Infectious Disease Dr Bynum via fax [phone]  Follow up scheduled with ID Aug 18th  Follow up with ortho Dr Naidu next week    Wound vac replaced with Prevena Dressings over medial and lateral incisions. Pt will not need daily wound care, ok to leave dressing in place.    Spoke with case management, plan for discharge later today pending home IV abx plan.    Dr. Maria Elena Johnson, PGY3  [facility]  Pager: [phone]

## 2020-07-28 NOTE — PLAN OF CARE
TN met with pt prior to d/c   Discharge rounds on patient. Discussed followup appointments, blue discharge folder, discharge nurse will go over home medications and reasons for medications and final discharge instructions. All patient/caregiver questions answered. Patient verbalized understanding.    per MD - pt will not be d/c'd to home with wound vac  - will have Prevena placed -- no need for wound care as well as MD will care for dressing ---     Daniel with [facility] met with pt again and reviewed infusion instructions  --- Daniel states pt was able to comfortably complete return demonstration and feels comfortable discharging to home with IV abx.  Daniel will meet with pt, her daughter and sister at pt's home post d/c.   IV meds to be delivered to pt's home   Pt is aware that she will have to go to infusion suite for PICC line care.       Nakul with [employer] picked up the home wound vac which was ordered for pt.         Renetta Carter with [facility] to contact pt with ID apt --- cofirmed per phone with Renetta                                     []Michelle hallied text    []Nestor for details     [facility]   On 8/12/2020   9:20 am PCP/hospital follow up    [address], [address]  [facility] [id number] [phone]   Triston Naidu IV, MD   On 8/5/2020   10:00 am    [address]  [address]  [place] [place] [address]  [phone]   [facility]       (AKA- [facility]) Provider of IV Abx   [address] [place] [address]  [phone]   [facility]       You will be contacted by Renetta Peraza with an Infectious Disease apt p #     [address], [address]  [facility] [id number] [phone]                  07/28/20 1553   Final Note   Assessment Type Final Discharge Note   Anticipated Discharge Disposition IV Therapy  ([facility])   What phone number can be called within the next 1-3 days  to see how you are doing after discharge? [phone]   Hospital Follow Up  Appt(s) scheduled? Yes   Discharge plans and expectations educations in teach back method with documentation complete? Yes   [facility] Referral Info   Post Acute Recommendation No Care   Referral Type Infusion   Facility Name [facility]

## 2020-07-28 NOTE — PROGRESS NOTES
U Infectious Diseases Progress Note    Assessment/Plan:     1. Deep postoperative wound infection  61 yo woman s/p I&D after developing deep wound infection following ORIF for trimalleolar fracture. Hardware retained. ESR 71, CRP 13.9.   - Wound cultures growing MRSA and Enterobacter cloacae. Continue Vancomycin 1500mg qd and Cefepime 2g q8h x6 weeks (from date of washout as patient has retained hardware, end date: 2020).    - ID follow up: Dr. Cade, 2020, [facility].   - Will need CBC, CMP, ESR and CRP weekly. Also needs weekly vanc trough levels.   - Would also recommend Tdap vaccine if patient is not UTD (none in [facility] system and she is unsure of last vaccination).    Piedad Frank MD  [facility] Internal Medicine -III  [facility] Infectious Diseases       Thank you for allowing us to participate in the care of this patient. We will continue to follow along. Case has been discussed with consult staff, who is in agreement with assessment and plan.     Subjective:      Taty Kenney is a 60 y.o. female who is being followed by the [facility] Infectious Diseases service at [facility] for deep postoperative wound infection.     No events overnight, afebrile.      Objective:   Last 24 Hour Vital Signs:  BP  Min: 113/72  Max: 168/88  Temp  Av.9 °F (36.6 °C)  Min: 97.2 °F (36.2 °C)  Max: 98.6 °F (37 °C)  Pulse  Av.1  Min: 72  Max: 84  Resp  Av.2  Min: 14  Max: 20  SpO2  Av %  Min: 91 %  Max: 96 %  Weight  Av.4 kg (205 lb 14.6 oz)  Min: 93.4 kg (205 lb 14.6 oz)  Max: 93.4 kg (205 lb 14.6 oz)  I/O last 3 completed shifts:  In: 1155 [P.O.:805; IV Piggyback:350]  Out: 1530 [Urine:1500; Other:30]    Physical Exam  Constitutional:       General: She is not in acute distress.     Appearance: Normal appearance. She is not ill-appearing or diaphoretic.   HENT:      Head: Normocephalic and atraumatic.      Nose: Nose normal.      Mouth/Throat:      Mouth: Mucous  membranes are moist.      Pharynx: Oropharynx is clear. No oropharyngeal exudate or posterior oropharyngeal erythema.   Eyes:      General: No scleral icterus.     Conjunctiva/sclera: Conjunctivae normal.   Neck:      Musculoskeletal: Normal range of motion and neck supple. No neck rigidity.   Cardiovascular:      Rate and Rhythm: Normal rate and regular rhythm.      Heart sounds: Normal heart sounds.   Pulmonary:      Effort: Pulmonary effort is normal.      Breath sounds: Normal breath sounds.   Abdominal:      General: There is no distension.      Palpations: Abdomen is soft.      Tenderness: There is no abdominal tenderness.   Musculoskeletal:      Comments: Left leg in post-surgical dressing.   Skin:     Coloration: Skin is not jaundiced.      Findings: No lesion or rash.   Neurological:      General: No focal deficit present.      Mental Status: She is alert and oriented to person, place, and time.   Psychiatric:         Mood and Affect: Mood normal.         Behavior: Behavior normal.     Laboratory:  Laboratory Data Reviewed: yes  Pertinent Findings:  Recent Labs   Lab 07/24/20  1013  07/26/20  0529 07/27/20  0553 07/28/20  0530   WBC 12.59  --   --   --   --    HGB 11.5*  --   --   --   --    HCT 35.2*  --   --   --   --      --   --   --   --    MCV 95  --   --   --   --    RDW 13.3  --   --   --   --    NA  --    < > 137 135* 133*   K  --    < > 4.7 4.5 4.3   CL  --    < > 103 101 100   CO2  --    < > 27 26 25   BUN  --    < > 15 15 14   CREATININE 0.8   < > 0.9 1.0 0.8   GLU  --    < > 96 92 101   PROT  --    < > 6.7 6.8 6.7   ALBUMIN  --    < > 3.2* 3.2* 3.2*   BILITOT  --    < > 0.2 0.3 0.4   AST  --    < > 18 19 17   ALKPHOS  --    < > 81 81 81   ALT  --    < > 11 12 10    < > = values in this interval not displayed.         Microbiology Data Reviewed: yes  Pertinent Findings:  Wound cultures (7/24) MRSA, Enterobacter cloacae     Other Results:  Radiology Data Reviewed: yes  Pertinent  Findings:  CXR with appropriate PICC placement, no acute intrathoracic findings.     Current Medications:     Infusions:       Scheduled:   aspirin  81 mg Oral Daily    ceFEPime (MAXIPIME) IVPB  2 g Intravenous Q8H    famotidine  20 mg Oral BID    FLUoxetine  60 mg Oral Daily    oxyCODONE-acetaminophen  1 tablet Oral Q6H    propranoloL  20 mg Oral BID    senna-docusate 8.6-50 mg  1 tablet Oral BID    sodium chloride 0.9%  10 mL Intravenous Q6H    vancomycin (VANCOCIN) IVPB  1,500 mg Intravenous Q24H        PRN:  bisacodyL, ibuprofen, ondansetron, Flushing PICC Protocol **AND** sodium chloride 0.9% **AND** sodium chloride 0.9%, DIPH,PERTUS (ADACEL),TETANUS PF VAC (ADULT), Pharmacy to dose Vancomycin consult **AND** vancomycin - pharmacy to dose    Antibiotics and Day Number of Therapy:    Vancomycin 7/24 - current  Cefepime 7/27 - current    Lines and Day Number of Therapy:  PICC 7/24 to current   NSTEMI (non-ST elevated myocardial infarction)

## 2020-07-28 NOTE — PROGRESS NOTES
TN spoke with Orthopedic Resident --   pt will d/c to home but won't need wound vac nor wound care apts for care of dressing/wound vac.       TN will retrieve [employer] wound vac from pt's room and return the item.       pt will now be on two iv abx - revised orders faxed to [facility] to meet with pt to review home infusion and address any patient concerns re:  iv abx administration.

## 2020-07-28 NOTE — PROGRESS NOTES
[facility]  On 8/12/2020  9:20 am PCP/hospital follow up   [address], [address]  [facility] [address] [phone]   Triston Naidu IV, MD  On 8/5/2020  10:00 am   [address]  [address]  [facility] [address]  [phone]   [facility]    (AKA- [employer]) Provider of IV Abx  [address] [address]  [phone]   [facility]    You will be contacted by Renetta Peraza with an Infectious Disease apt p # [phone]   [address], [address]  [facility] [address] [phone]

## 2020-07-29 VITALS
RESPIRATION RATE: 18 BRPM | WEIGHT: 201.94 LBS | HEIGHT: 66 IN | DIASTOLIC BLOOD PRESSURE: 94 MMHG | BODY MASS INDEX: 32.45 KG/M2 | HEART RATE: 73 BPM | SYSTOLIC BLOOD PRESSURE: 150 MMHG | OXYGEN SATURATION: 97 % | TEMPERATURE: 97 F

## 2020-07-29 LAB
ALBUMIN SERPL BCP-MCNC: 3.2 G/DL (ref 3.5–5.2)
ALP SERPL-CCNC: 81 U/L (ref 55–135)
ALT SERPL W/O P-5'-P-CCNC: 11 U/L (ref 10–44)
ANION GAP SERPL CALC-SCNC: 5 MMOL/L (ref 8–16)
AST SERPL-CCNC: 17 U/L (ref 10–40)
BILIRUB SERPL-MCNC: 0.3 MG/DL (ref 0.1–1)
BUN SERPL-MCNC: 15 MG/DL (ref 8–23)
CALCIUM SERPL-MCNC: 9.2 MG/DL (ref 8.7–10.5)
CHLORIDE SERPL-SCNC: 103 MMOL/L (ref 95–110)
CO2 SERPL-SCNC: 26 MMOL/L (ref 23–29)
CREAT SERPL-MCNC: 0.8 MG/DL (ref 0.5–1.4)
EST. GFR  (AFRICAN AMERICAN): >60 ML/MIN/1.73 M^2
EST. GFR  (NON AFRICAN AMERICAN): >60 ML/MIN/1.73 M^2
GLUCOSE SERPL-MCNC: 97 MG/DL (ref 70–110)
POTASSIUM SERPL-SCNC: 4.1 MMOL/L (ref 3.5–5.1)
PROT SERPL-MCNC: 6.9 G/DL (ref 6–8.4)
SODIUM SERPL-SCNC: 134 MMOL/L (ref 136–145)

## 2020-07-29 PROCEDURE — A4216 STERILE WATER/SALINE, 10 ML: HCPCS | Performed by: ORTHOPAEDIC SURGERY

## 2020-07-29 PROCEDURE — 80053 COMPREHEN METABOLIC PANEL: CPT

## 2020-07-29 PROCEDURE — 63600175 PHARM REV CODE 636 W HCPCS: Performed by: ORTHOPAEDIC SURGERY

## 2020-07-29 PROCEDURE — 90471 IMMUNIZATION ADMIN: CPT | Performed by: ORTHOPAEDIC SURGERY

## 2020-07-29 PROCEDURE — 25000003 PHARM REV CODE 250: Performed by: ORTHOPAEDIC SURGERY

## 2020-07-29 PROCEDURE — 90715 TDAP VACCINE 7 YRS/> IM: CPT | Performed by: ORTHOPAEDIC SURGERY

## 2020-07-29 RX ADMIN — Medication 10 ML: at 11:07

## 2020-07-29 RX ADMIN — CEFEPIME HYDROCHLORIDE 2 G: 2 INJECTION, SOLUTION INTRAVENOUS at 05:07

## 2020-07-29 RX ADMIN — OXYCODONE HYDROCHLORIDE AND ACETAMINOPHEN 1 TABLET: 5; 325 TABLET ORAL at 11:07

## 2020-07-29 RX ADMIN — CEFEPIME HYDROCHLORIDE 2 G: 2 INJECTION, SOLUTION INTRAVENOUS at 11:07

## 2020-07-29 RX ADMIN — PROPRANOLOL HYDROCHLORIDE 20 MG: 10 TABLET ORAL at 08:07

## 2020-07-29 RX ADMIN — ASPIRIN 81 MG: 81 TABLET, COATED ORAL at 08:07

## 2020-07-29 RX ADMIN — Medication 10 ML: at 05:07

## 2020-07-29 RX ADMIN — FAMOTIDINE 20 MG: 20 TABLET ORAL at 08:07

## 2020-07-29 RX ADMIN — OXYCODONE HYDROCHLORIDE AND ACETAMINOPHEN 1 TABLET: 5; 325 TABLET ORAL at 05:07

## 2020-07-29 RX ADMIN — CLOSTRIDIUM TETANI TOXOID ANTIGEN (FORMALDEHYDE INACTIVATED), CORYNEBACTERIUM DIPHTHERIAE TOXOID ANTIGEN (FORMALDEHYDE INACTIVATED), BORDETELLA PERTUSSIS TOXOID ANTIGEN (GLUTARALDEHYDE INACTIVATED), BORDETELLA PERTUSSIS FILAMENTOUS HEMAGGLUTININ ANTIGEN (FORMALDEHYDE INACTIVATED), BORDETELLA PERTUSSIS PERTACTIN ANTIGEN, AND BORDETELLA PERTUSSIS FIMBRIAE 2/3 ANTIGEN 0.5 ML: 5; 2; 2.5; 5; 3; 5 INJECTION, SUSPENSION INTRAMUSCULAR at 11:07

## 2020-07-29 RX ADMIN — FLUOXETINE HYDROCHLORIDE 60 MG: 20 CAPSULE ORAL at 08:07

## 2020-07-29 RX ADMIN — IBUPROFEN 400 MG: 400 TABLET, FILM COATED ORAL at 08:07

## 2020-07-29 NOTE — PLAN OF CARE
Patient has received discharge instructions. Prescriptions received. Instructions reviewed with pt using teachback method. All questions answered to pt satisfaction.PICC line to remain for home antibiotic use. Arrangements for iv antibiotics delivery to pt's house and education per iv company at pts home completed.  Transport requested.

## 2020-07-29 NOTE — PROGRESS NOTES
[facility] Progress Note     Orthopaedic Surgeries:  60F s/p L faustina fx I&D on 7/24/20 for postop wound infection and incisional wound vac placement     S:   NAEO, VSS, resting comfortably, pain well controlled, Prevena dressing in placeto medial and lateral ankle wounds, minimal output.   Denies CP, SOB, dizziness.     O:   PE:  Gen: A+Ox3, NAD  Card: RRR by RP  Lungs: nonlabored breathing, symmetric chest rise  Abd: S/NT/ND  LLE  Short leg splint in place  Prevena dressing in placeto medial and lateral ankle wounds  No drainage or purulence or fluctuance  Mild erythema around skin edges  Some numbness over dorsum of foot  Wound vac in place to good suction  Toes warm and well perfused  EHL/FHL 5/5     Cultures: NGTD     A/P:  60F s/p L faustina fx ORIF 7/7/20, with I&D on 7/24/20 for postop wound infection and incisional wound vac placement     -admitted as inpatient for IV abx and infectious disease evaluation  -cultures growing MRSA and Enterobacter cloacae, sensitivities resulted  -ID recommending vanc + cefepime at this time until cultures result  -vanc dose 1500 mg daily for 6 weeks postop  -cefepime 2g q8 hrs   -Prevena dressings x2 in place to L medial and lateral mal ok to leave in place until office visit  -started on bowel regimen  -PICC line placed  -PO pain control at this time  -regular diet  -DVT ppx Aspirin PO  -NWB LLE  -follow up in clinic w Dr Naidu in 2 weeks  ?  DC Dispo:   1. pending ID abx recs   2. PT will need home health PICC IV abx  3. also wound vac changes by wound care  PLAN FOR DISCHARGE THIS MORNING     Dr. Maria Elena Johnson, PGY3  [facility] Orthopaedic Surgery  Pager: [phone]

## 2020-07-29 NOTE — PLAN OF CARE
pt d/c'd today accompanied by sister.  They will meet today at 2:30 pm at pt's home with infusion nurse Daniel to review IV abx administration.       Final, updated HH orders [facility]ed [facility].   Pt was declined by numerous HH agencies (at least 8) per [facility].         07/29/20 1353   Final Note   Assessment Type Final Discharge Note   Anticipated Discharge Disposition IV Therapy  (Bioscript)   What phone number can be called within the next 1-3 days to see how you are doing after discharge? [phone]   Hospital Follow Up  Appt(s) scheduled? Yes   Discharge plans and expectations educations in teach back method with documentation complete? Yes   [facility] Referral Info   Post Acute Recommendation No Care   Referral Type Infusion   Facility Name [facility]

## 2020-07-29 NOTE — PLAN OF CARE
VIRTUAL NURSE:  Cued into patient's room.  Patient not responding to introduction and permission inquiry.  Patient resting comfortably in bed with eyes closed; respirations even and unlabored.  No distress noted.    Labs, notes, orders, and careplan reviewed.

## 2020-07-29 NOTE — PLAN OF CARE
Pt vitals were maintained,pt had some moderate pain relieved with pain meds. Pt tolerated IV antibiotics, cont. Wound vac. Pt wound was C/D/I. Pt is pending d/c after pt daughter learns how to administer iv antibiotics thru PICC line. DANIAL

## 2020-07-29 NOTE — PLAN OF CARE
Patient A&Ox4. On contact isolation for MRSA on foot. With dry and intact dressing on left foot with woundvac no drainage noted. For d/c today. MELVI PICC intact, no redness and swelling noted. AVS printed and handed out to patient. Home meds delivered at bedside. Awaiting transport aide.

## 2020-07-29 NOTE — PROGRESS NOTES
pt d/c'd today accompanied by sister.  They will meet today at 2:30 pm at pt's home with infusion nurse Daniel to review IV abx administration.       Final, updated HH orders [facility]ed [facility].   Pt was declined by numerous HH agencies (at least 8) per [facility].

## 2020-07-29 NOTE — PROGRESS NOTES
Pharmacokinetic Assessment Follow Up: IV Vancomycin    Vancomycin serum concentration assessment(s):    The trough level was drawn correctly and can be used to guide therapy at this time. The measurement is below the desired definitive target range of 15 to 20 mcg/mL.    Vancomycin Regimen Plan:    Change regimen to Vancomycin 1750 mg IV every 24 hours with next serum trough concentration measured at 1430 prior to 3rd dose on 07/31/2020    Drug levels (last 3 results):  Recent Labs   Lab Result Units 07/26/20  0049 07/26/20  1238 07/28/20  1613   Vancomycin, Random ug/mL  --  14.3  --    Vancomycin-Trough ug/mL 24.2*  --  12.3       Pharmacy will continue to follow and monitor vancomycin.    Please contact pharmacy at extension [phone] for questions regarding this assessment.    Thank you for the consult,   Nasrin Owusu       Patient brief summary:  Taty Kenney is a 60 y.o. female initiated on antimicrobial therapy with IV Vancomycin for treatment of bone/joint infection    The patient's current regimen is vancomycin 1500 mg q24h    Drug Allergies:   Review of patient's allergies indicates:   Allergen Reactions    Augmentin [amoxicillin-pot clavulanate] Other (See Comments)     Stomach cramps       Actual Body Weight:   93.4 kg    Renal Function:   Estimated Creatinine Clearance: 85 mL/min (based on SCr of 0.8 mg/dL).,     Dialysis Method (if applicable):  N/A    CBC (last 72 hours):  No results for input(s): WHITE BLOOD CELL COUNT, HEMOGLOBIN, HEMATOCRIT, PLATELETS, GRAN%, LYMPH%, MONO%, EOSINOPHIL%, BASOPHIL%, DIFFERENTIAL METHOD in the last 72 hours.    Metabolic Panel (last 72 hours):  Recent Labs   Lab Result Units 07/26/20  0529 07/27/20  0553 07/28/20  0530   Sodium mmol/L 137 135* 133*   Potassium mmol/L 4.7 4.5 4.3   Chloride mmol/L 103 101 100   CO2 mmol/L 27 26 25   Glucose mg/dL 96 92 101   BUN, Bld mg/dL 15 15 14   Creatinine mg/dL 0.9 1.0 0.8   Albumin g/dL 3.2* 3.2* 3.2*   Total  Bilirubin mg/dL 0.2 0.3 0.4   Alkaline Phosphatase U/L 81 81 81   AST U/L 18 19 17   ALT U/L 11 12 10       Vancomycin Administrations:  vancomycin given in the last 96 hours                     vancomycin 1.5 g in dextrose 5 % 250 mL IVPB (ready to mix) (mg) 1,500 mg New Bag 07/28/20 1623     1,500 mg New Bag 07/27/20 1744     1,500 mg New Bag 07/26/20 1749    vancomycin (VANCOCIN) 1,250 mg in dextrose 5 % 250 mL IVPB (mg) 1,250 mg New Bag 07/25/20 1348     1,250 mg New Bag  0053                    Microbiologic Results:  Microbiology Results (last 7 days)       Procedure Component Value Units Date/Time    Aerobic culture [[id number]]  (Abnormal)  (Susceptibility) Collected: 07/24/20 0905    Order Status: Completed Specimen: Skin from Ankle, Left Updated: 07/28/20 1404     Aerobic Bacterial Culture METHICILLIN RESISTANT STAPHYLOCOCCUS AUREUS  Rare      Narrative:      Left ankle lateral tissue    Culture, Anaerobe [[id number]] Collected: 07/24/20 0905    Order Status: Completed Specimen: Tissue from Ankle, Left Updated: 07/28/20 1345     Anaerobic Culture No anaerobes isolated    Narrative:      Left ankle lateral tissue    Culture, Anaerobe [[id number]] Collected: 07/24/20 0904    Order Status: Completed Specimen: Wound from Ankle, Left Updated: 07/28/20 1344     Anaerobic Culture No anaerobes isolated    Narrative:      Left medial ankle    Culture, Anaerobe [[id number]] Collected: 07/24/20 0904    Order Status: Completed Specimen: Wound from Ankle, Left Updated: 07/28/20 1343     Anaerobic Culture No anaerobes isolated    Narrative:      Lateral ankle 2    Culture, Anaerobe [[id number]] Collected: 07/24/20 0904    Order Status: Completed Specimen: Wound from Ankle, Left Updated: 07/28/20 1342     Anaerobic Culture No anaerobes isolated    Narrative:      Lateral ankle 1    Aerobic culture [[id number]]  (Abnormal)  (Susceptibility) Collected: 07/24/20 0904    Order Status: Completed Specimen: Wound from Ankle, Left  Updated: 07/28/20 0820     Aerobic Bacterial Culture METHICILLIN RESISTANT STAPHYLOCOCCUS AUREUS  Moderate        ENTEROBACTER CLOACAE  Rare      Narrative:      Lateral ankle 1    Aerobic culture [[id number]]  (Abnormal)  (Susceptibility) Collected: 07/24/20 0904    Order Status: Completed Specimen: Wound from Ankle, Left Updated: 07/28/20 0819     Aerobic Bacterial Culture METHICILLIN RESISTANT STAPHYLOCOCCUS AUREUS  Few        ENTEROBACTER CLOACAE  Rare      Narrative:      Lateral ankle 2    AFB Culture & Smear [[id number]] Collected: 07/24/20 0904    Order Status: Completed Specimen: Wound from Ankle, Left Updated: 07/27/20 1516     AFB Culture & Smear Culture in progress     AFB CULTURE STAIN No acid fast bacilli seen.    Narrative:      Lateral ankle 1    AFB Culture & Smear [[id number]] Collected: 07/24/20 0904    Order Status: Completed Specimen: Wound from Ankle, Left Updated: 07/27/20 1516     AFB Culture & Smear Culture in progress     AFB CULTURE STAIN No acid fast bacilli seen.    Narrative:      Lateral ankle 2    AFB Culture & Smear [[id number]] Collected: 07/24/20 0904    Order Status: Completed Specimen: Wound from Ankle, Left Updated: 07/27/20 1516     AFB Culture & Smear Culture in progress     AFB CULTURE STAIN No acid fast bacilli seen.    Narrative:      Left medial ankle    AFB Culture & Smear [[id number]] Collected: 07/24/20 0905    Order Status: Completed Specimen: Tissue from Ankle, Left Updated: 07/27/20 1516     AFB Culture & Smear Culture in progress     AFB CULTURE STAIN No acid fast bacilli seen.    Narrative:      Left ankle lateral tissue    Fungus culture [[id number]] Collected: 07/24/20 0904    Order Status: Completed Specimen: Wound from Ankle, Left Updated: 07/27/20 1417     Fungus (Mycology) Culture Culture in progress    Narrative:      Lateral ankle 1    Fungus culture [[id number]] Collected: 07/24/20 0904    Order Status: Completed Specimen: Wound from Ankle, Left Updated: 07/27/20  1417     Fungus (Mycology) Culture Culture in progress    Narrative:      Lateral ankle 2    Fungus culture [[id number]] Collected: 07/24/20 0905    Order Status: Completed Specimen: Tissue from Ankle, Left Updated: 07/27/20 1417     Fungus (Mycology) Culture Culture in progress    Narrative:      Left ankle lateral tissue    Fungus culture [[id number]] Collected: 07/24/20 0904    Order Status: Completed Specimen: Wound from Ankle, Left Updated: 07/27/20 1417     Fungus (Mycology) Culture Culture in progress    Narrative:      Left medial ankle    Aerobic culture [[id number]]  (Abnormal)  (Susceptibility) Collected: 07/24/20 0904    Order Status: Completed Specimen: Wound from Ankle, Left Updated: 07/27/20 1028     Aerobic Bacterial Culture METHICILLIN RESISTANT STAPHYLOCOCCUS AUREUS  Moderate      Narrative:      Left medial ankle    Gram stain [[id number]] Collected: 07/24/20 0904    Order Status: Completed Specimen: Wound from Ankle, Left Updated: 07/24/20 2041     Gram Stain Result No WBC's      No organisms seen    Narrative:      Left medial ankle    Gram stain [[id number]] Collected: 07/24/20 0905    Order Status: Completed Specimen: Tissue from Ankle, Left Updated: 07/24/20 2032     Gram Stain Result Rare WBC's      No organisms seen    Narrative:      Left ankle lateral tissue    Gram stain [[id number]] Collected: 07/24/20 0904    Order Status: Completed Specimen: Wound from Ankle, Left Updated: 07/24/20 2031     Gram Stain Result No WBC's      No organisms seen    Narrative:      Lateral ankle 1    Gram stain [[id number]] Collected: 07/24/20 0904    Order Status: Completed Specimen: Wound from Ankle, Left Updated: 07/24/20 2027     Gram Stain Result No WBC's      No organisms seen    Narrative:      Lateral ankle 2

## 2020-07-29 NOTE — PROGRESS NOTES
Follow-up With  Details  Why  Contact Info   [facility]  On 8/12/2020  9:20 am PCP/hospital follow up   [address], [address]  [facility] [address] [phone]   Triston Naidu IV, MD  On 8/5/2020  10:00 am   [address]  [address]  [facility] [address]  [phone]   [facility]    (AKA- [employer]) Provider of IV Abx  [address]  [place] [address]  [phone]   [facility]  On 8/3/2020  you will see Dr. Ambriz 11:00 am p #    [address], [address]  [facility] [address] [phone]

## 2020-07-29 NOTE — PROGRESS NOTES
\[facility]\"" Infectious Diseases [facility] Progress Note    Assessment/Plan:     Deep postoperative wound infection  61 yo woman s/p I&D after developing deep wound infection following ORIF for trimalleolar fracture. Hardware retained. ESR 71, CRP 13.9.   - Wound cultures growing MRSA and Enterobacter cloacae. Continue Vancomycin 1750mg qd and Cefepime 2g q8h x6 weeks (from date of washout as patient has retained hardware, end date: 2020).    - ID follow up: Dr. Cade, 2020, [facility].   - Will need CBC, CMP, ESR and CRP weekly. Also needs weekly vanc trough levels.   - Would also recommend Tdap vaccine if patient is not UTD (none in [facility] system and she is unsure of last vaccination).    Piedad Frank MD  \[facility]\"" Internal Medicine [facility]  \[facility]\"" Infectious Diseases       Thank you for allowing us to participate in the care of this patient. We will continue to follow along. Case has been discussed with consult staff, who is in agreement with assessment and plan.     Subjective:      Taty Kenney is a 60 y.o. female who is being followed by the [facility] service at [facility] for deep postoperative wound infection.     No events overnight, afebrile. Patient frustrated this morning as she wants to go home but family was unable to get PICC education yesterday. Explained to her in detail the importance of close follow up with all providers, including Infectious Diseases clinic. Patient in agreement.      Objective:   Last 24 Hour Vital Signs:  BP  Min: 119/56  Max: 158/93  Temp  Av.7 °F (36.5 °C)  Min: 97.5 °F (36.4 °C)  Max: 98.1 °F (36.7 °C)  Pulse  Av.9  Min: 68  Max: 80  Resp  Av.1  Min: 12  Max: 19  SpO2  Av.7 %  Min: 94 %  Max: 98 %  Weight  Av.6 kg (201 lb 15.1 oz)  Min: 91.6 kg (201 lb 15.1 oz)  Max: 91.6 kg (201 lb 15.1 oz)  I/O last 3 completed shifts:  In: 250 [P.O.:200; IV Piggyback:50]  Out: 1330 [Urine:1300; Other:30]    Physical  Exam  Constitutional:       General: She is not in acute distress.     Appearance: Normal appearance. She is not ill-appearing or diaphoretic.   HENT:      Head: Normocephalic and atraumatic.      Nose: Nose normal.      Mouth/Throat:      Mouth: Mucous membranes are moist.      Pharynx: Oropharynx is clear. No oropharyngeal exudate or posterior oropharyngeal erythema.   Eyes:      General: No scleral icterus.     Conjunctiva/sclera: Conjunctivae normal.   Neck:      Musculoskeletal: Normal range of motion and neck supple. No neck rigidity.   Cardiovascular:      Rate and Rhythm: Normal rate and regular rhythm.      Heart sounds: Normal heart sounds.   Pulmonary:      Effort: Pulmonary effort is normal.      Breath sounds: Normal breath sounds.   Abdominal:      General: There is no distension.      Palpations: Abdomen is soft.      Tenderness: There is no abdominal tenderness.   Musculoskeletal:      Comments: Left leg in post-surgical dressing. Less tender to touch or manipulation.  Skin:     Coloration: Skin is not jaundiced.      Findings: No lesion or rash.   Neurological:      General: No focal deficit present.      Mental Status: She is alert and oriented to person, place, and time.   Psychiatric:         Mood and Affect: Mood normal.         Behavior: Behavior normal.     Laboratory:  Laboratory Data Reviewed: yes  Pertinent Findings:  Recent Labs   Lab 07/24/20  1013  07/27/20  0553 07/28/20  0530 07/28/20  1918 07/29/20  0500   WBC 12.59  --   --   --  9.53  --    HGB 11.5*  --   --   --  10.0*  --    HCT 35.2*  --   --   --  29.9*  --      --   --   --  240  --    MCV 95  --   --   --  92  --    RDW 13.3  --   --   --  13.2  --    NA  --    < > 135* 133*  --  134*   K  --    < > 4.5 4.3  --  4.1   CL  --    < > 101 100  --  103   CO2  --    < > 26 25  --  26   BUN  --    < > 15 14  --  15   CREATININE 0.8   < > 1.0 0.8  --  0.8   GLU  --    < > 92 101  --  97   PROT  --    < > 6.8 6.7  --  6.9    ALBUMIN  --    < > 3.2* 3.2*  --  3.2*   BILITOT  --    < > 0.3 0.4  --  0.3   AST  --    < > 19 17  --  17   ALKPHOS  --    < > 81 81  --  81   ALT  --    < > 12 10  --  11    < > = values in this interval not displayed.         Microbiology Data Reviewed: yes  Pertinent Findings:  Wound cultures (7/24) MRSA, Enterobacter cloacae     Other Results:  Radiology Data Reviewed: yes  Pertinent Findings:  CXR with appropriate PICC placement, no acute intrathoracic findings.     Current Medications:     Infusions:       Scheduled:   aspirin  81 mg Oral Daily    ceFEPime (MAXIPIME) IVPB  2 g Intravenous Q8H    famotidine  20 mg Oral BID    FLUoxetine  60 mg Oral Daily    oxyCODONE-acetaminophen  1 tablet Oral Q6H    propranoloL  20 mg Oral BID    senna-docusate 8.6-50 mg  1 tablet Oral BID    sodium chloride 0.9%  10 mL Intravenous Q6H    vancomycin (VANCOCIN) IVPB  1,750 mg Intravenous Q24H        PRN:  ALPRAZolam, bisacodyL, ibuprofen, ondansetron, Flushing PICC Protocol **AND** sodium chloride 0.9% **AND** sodium chloride 0.9%, DIPH,PERTUS (ADACEL),TETANUS PF VAC (ADULT), Pharmacy to dose Vancomycin consult **AND** vancomycin - pharmacy to dose    Antibiotics and Day Number of Therapy:    Vancomycin 7/24 - current  Cefepime 7/27 - current    Lines and Day Number of Therapy:  PICC 7/24 to current

## 2020-07-31 NOTE — PHYSICIAN QUERY
"PT Name: Taty Kenney  MR #: [id number]    Procedure Clarification     CDS/: Maricarmen Pena               Contact information: [email or web address]  This form is a permanent document in the medical record.    Query Date: July 31, 2020  By submitting this query, we are merely seeking further clarification of documentation. Please utilize your independent clinical judgment when addressing the question(s) below.    The Medical Record contains the following:   Indicator Supporting Clinical Findings Location in Medical Record    x Documentation of "Debridement"  Necrotic tissue was debrided and sharply excised from the wound edges and from the surrounding tissues using a combination of scalpel and rongeur.     Orthopedic Surgery Op Note File Time: 07/27/2020 11:45 AM    x Documentation of "I&D"  Incision and drainage of complex postoperative wound infection   Orthopedic Surgery Op Note File Time: 07/27/2020 11:45 AM      x Other  The sutures were removed from the lateral and medial ankle wounds. A 12 cm incision was made over the lateral ankle at the prior surgical site.   Purulent material was expressed; tissue and swab cultures were taken.    The hardware and distal fibular fracture were assessed and found to be well fixed and stable, thus hardware was retained.  Orthopedic Surgery Op Note File Time: 07/27/2020 11:45 AM     Excisional debridement is a surgical removal of nonvitalized tissue, necrosis or slough. The use of a sharp instrument does not always indicate that an excisional debridement was performed.    Nonexcisional debridement is the scraping, washing, irrigating, brushing away or removal of loose tissue fragments.    Provider, please provide further clarification on the procedure performed on _Left lateral ankle_wound             :    [x   ] Excisional Debridement to skin   [x   ] Excisional Debridement to subcutaneous tissue/fascia   [   ] Excisional Debridement to muscle   [   ] Excisional " Debridement to tendon   [   ] Excisional Debridement to bone          [   ] Incision and Drainage only (please specify site of drainage): ___________   [   ] Other Procedure (please specify): _____________   [  ] Clinically Undetermined

## 2020-07-31 NOTE — PHYSICIAN QUERY
"PT Name: Taty Kenney  MR #: [id number]    Procedure Clarification     CDS/: Maricarmen Pena               Contact information:[email or web address]  This form is a permanent document in the medical record.    Query Date: July 31, 2020  By submitting this query, we are merely seeking further clarification of documentation. Please utilize your independent clinical judgment when addressing the question(s) below.    The Medical Record contains the following:   Indicator Supporting Clinical Findings Location in Medical Record    x Documentation of "Debridement"  Necrotic tissue was debrided and sharply excised from the wound edges and from the surrounding tissue using a combination of scalpel and rongeur.      Orthopedic Surgery Op Note File Time: 07/27/2020 11:45 AM    x Documentation of "I&D"  Incision and drainage of complex postoperative wound infection     Orthopedic Surgery Op Note File Time: 07/27/2020 11:45 AM    x Other  Attention was now turned toward the medial wound, where a 7cm incision was made over the medial ankle at the prior surgical site.  Purulent material was expressed; cultures were taken.  The hardware was assessed and found to be well fixed, thus it was retained.  Orthopedic Surgery Op Note File Time: 07/27/2020 11:45 AM     Excisional debridement is a surgical removal of nonvitalized tissue, necrosis or slough. The use of a sharp instrument does not always indicate that an excisional debridement was performed.    Nonexcisional debridement is the scraping, washing, irrigating, brushing away or removal of loose tissue fragments.    Provider, please provide further clarification on the procedure performed on __Left_medial ankle wound____             :    [ x  ] Excisional Debridement to skin   [ x  ] Excisional Debridement to subcutaneous tissue/fascia   [   ] Excisional Debridement to muscle   [   ] Excisional Debridement to tendon   [   ] Excisional Debridement to bone          [   ] " Incision and Drainage only (please specify site of drainage): ___________   [   ] Other Procedure (please specify): _____________   [  ] Clinically Undetermined

## 2020-08-03 ENCOUNTER — OFFICE VISIT (OUTPATIENT)
Dept: FAMILY MEDICINE | Facility: HOSPITAL | Age: 60
End: 2020-08-03
Attending: INTERNAL MEDICINE
Payer: MEDICAID

## 2020-08-03 VITALS — BODY MASS INDEX: 32.59 KG/M2 | HEIGHT: 66 IN

## 2020-08-03 DIAGNOSIS — A49.8 INFECTION DUE TO ENTEROBACTER CLOACAE: ICD-10-CM

## 2020-08-03 DIAGNOSIS — A49.02 MRSA (METHICILLIN RESISTANT STAPHYLOCOCCUS AUREUS) INFECTION: ICD-10-CM

## 2020-08-03 DIAGNOSIS — T81.42XA DEEP POSTOPERATIVE WOUND INFECTION: Primary | ICD-10-CM

## 2020-08-03 PROCEDURE — 99212 OFFICE O/P EST SF 10 MIN: CPT | Performed by: INTERNAL MEDICINE

## 2020-08-04 ENCOUNTER — LAB VISIT (OUTPATIENT)
Dept: LAB | Facility: HOSPITAL | Age: 60
End: 2020-08-04
Attending: INTERNAL MEDICINE
Payer: MEDICAID

## 2020-08-04 DIAGNOSIS — R78.81 BACTEREMIA: Primary | ICD-10-CM

## 2020-08-04 LAB
BASOPHILS # BLD AUTO: 0.04 K/UL (ref 0–0.2)
BASOPHILS NFR BLD: 0.6 % (ref 0–1.9)
DIFFERENTIAL METHOD: ABNORMAL
EOSINOPHIL # BLD AUTO: 1 K/UL (ref 0–0.5)
EOSINOPHIL NFR BLD: 14.4 % (ref 0–8)
ERYTHROCYTE [DISTWIDTH] IN BLOOD BY AUTOMATED COUNT: 13.3 % (ref 11.5–14.5)
ERYTHROCYTE [SEDIMENTATION RATE] IN BLOOD BY WESTERGREN METHOD: 104 MM/HR (ref 0–36)
HCT VFR BLD AUTO: 32.8 % (ref 37–48.5)
HGB BLD-MCNC: 10.7 G/DL (ref 12–16)
IMM GRANULOCYTES # BLD AUTO: 0.02 K/UL (ref 0–0.04)
IMM GRANULOCYTES NFR BLD AUTO: 0.3 % (ref 0–0.5)
LYMPHOCYTES # BLD AUTO: 1.3 K/UL (ref 1–4.8)
LYMPHOCYTES NFR BLD: 19.6 % (ref 18–48)
MCH RBC QN AUTO: 30.7 PG (ref 27–31)
MCHC RBC AUTO-ENTMCNC: 32.6 G/DL (ref 32–36)
MCV RBC AUTO: 94 FL (ref 82–98)
MONOCYTES # BLD AUTO: 0.5 K/UL (ref 0.3–1)
MONOCYTES NFR BLD: 7.9 % (ref 4–15)
NEUTROPHILS # BLD AUTO: 3.8 K/UL (ref 1.8–7.7)
NEUTROPHILS NFR BLD: 57.2 % (ref 38–73)
NRBC BLD-RTO: 0 /100 WBC
PLATELET # BLD AUTO: 200 K/UL (ref 150–350)
PMV BLD AUTO: 12.2 FL (ref 9.2–12.9)
RBC # BLD AUTO: 3.49 M/UL (ref 4–5.4)
WBC # BLD AUTO: 6.62 K/UL (ref 3.9–12.7)

## 2020-08-04 PROCEDURE — 85652 RBC SED RATE AUTOMATED: CPT

## 2020-08-04 PROCEDURE — 85025 COMPLETE CBC W/AUTO DIFF WBC: CPT

## 2020-08-06 NOTE — PROGRESS NOTES
I have reviewed and concur with the resident's history, physical, assessment, and plan.  I have personally interviewed and examined the patient at bedside.

## 2020-08-06 NOTE — PROGRESS NOTES
Subjective:       Patient ID: Taty Kenney is a 60 y.o. female.    Chief Complaint: Hospital Follow Up    Ms. Taty Kenney is a 60-year-old female presenting for follow-up for her postoperative wound infection following a ORIF from a trimalleolar fracture.  Wound culture was positive for MRSA and Enterobacter cloacae, and patient was started on vancomycin 1750mg and cefepime 2 g Q8H for a total course of 6 weeks. Patient does have a wound VAC in place with no purulent drainage.     Review of Systems   Constitutional: Negative for chills and fever.   Respiratory: Negative for shortness of breath.    Cardiovascular: Negative for chest pain.   Musculoskeletal: Positive for arthralgias and joint swelling (left lower extremity). Negative for gait problem. Back pain: Left lower extremity.   Skin: Negative for color change and rash.       Objective:      Vitals:  Temperature: 98.7  HR: 86  BP: 121/78  Weight: 201 lbs  Height: 66 inches    Physical Exam  Constitutional:       General: She is not in acute distress.     Appearance: Normal appearance. She is not ill-appearing, toxic-appearing or diaphoretic.      Comments: Female in wheelchair with left lower extremity wound dressing.   HENT:      Head: Normocephalic and atraumatic.   Eyes:      Extraocular Movements: Extraocular movements intact.      Pupils: Pupils are equal, round, and reactive to light.   Cardiovascular:      Rate and Rhythm: Normal rate and regular rhythm.      Heart sounds: No murmur. No friction rub. No gallop.    Pulmonary:      Effort: No respiratory distress.      Breath sounds: No wheezing, rhonchi or rales.   Abdominal:      General: Abdomen is flat. There is no distension.      Palpations: Abdomen is soft.      Tenderness: There is no abdominal tenderness. There is no guarding.   Musculoskeletal:         General: Swelling and signs of injury (Left lower extremity ORIF) present.      Comments: Left lower extremity wound dressing  clean, dry, and intact with no strike through. Wound VAC in place with no purulent drainage. Left lower extremity non erythematous with mild tenderness to palpation.   Skin:     General: Skin is warm and dry.      Findings: No bruising, erythema or rash.   Neurological:      Mental Status: She is alert and oriented to person, place, and time.         Assessment:       1. Deep postoperative wound infection    2. MRSA (methicillin resistant Staphylococcus aureus) infection    3. Infection due to Enterobacter cloacae        Plan:       Deep postoperative wound infection    MRSA (methicillin resistant Staphylococcus aureus) infection    Infection due to Enterobacter cloacae      Ms. Taty Kenney is a 60-year-old female who developed a postoperative wound infection following ORIF from trimalleolar fracture. Plan is to continue vancomycin and cefepime for a total duration of 6 weeks. Patient will need a follow-up CBC, CMP, CRP, ESR, and vancomycin trough.  Will need to follow-up on vancomycin trough. Patient was scheduled to receive those labs at [facility] on [facility]. Patient to follow-up in 4 weeks.     Follow up in about 4 weeks (around 8/31/2020).      Vincent Díaz DO  \[facility]\"" Family Medicine PGY-1  08/06/2020

## 2020-08-10 ENCOUNTER — LAB VISIT (OUTPATIENT)
Dept: LAB | Facility: HOSPITAL | Age: 60
End: 2020-08-10
Attending: INTERNAL MEDICINE
Payer: MEDICAID

## 2020-08-10 DIAGNOSIS — R78.81 BACTEREMIA: Primary | ICD-10-CM

## 2020-08-10 LAB
ALBUMIN SERPL BCP-MCNC: 3.4 G/DL (ref 3.5–5.2)
ALP SERPL-CCNC: 91 U/L (ref 55–135)
ALT SERPL W/O P-5'-P-CCNC: 13 U/L (ref 10–44)
ANION GAP SERPL CALC-SCNC: 10 MMOL/L (ref 8–16)
AST SERPL-CCNC: 23 U/L (ref 10–40)
BASOPHILS # BLD AUTO: 0.06 K/UL (ref 0–0.2)
BASOPHILS NFR BLD: 0.8 % (ref 0–1.9)
BILIRUB SERPL-MCNC: 0.4 MG/DL (ref 0.1–1)
BUN SERPL-MCNC: 16 MG/DL (ref 8–23)
CALCIUM SERPL-MCNC: 9.7 MG/DL (ref 8.7–10.5)
CHLORIDE SERPL-SCNC: 105 MMOL/L (ref 95–110)
CO2 SERPL-SCNC: 22 MMOL/L (ref 23–29)
CREAT SERPL-MCNC: 1.1 MG/DL (ref 0.5–1.4)
CRP SERPL-MCNC: 12.5 MG/L (ref 0–8.2)
DIFFERENTIAL METHOD: ABNORMAL
EOSINOPHIL # BLD AUTO: 0.4 K/UL (ref 0–0.5)
EOSINOPHIL NFR BLD: 5.8 % (ref 0–8)
ERYTHROCYTE [DISTWIDTH] IN BLOOD BY AUTOMATED COUNT: 13.2 % (ref 11.5–14.5)
ERYTHROCYTE [SEDIMENTATION RATE] IN BLOOD BY WESTERGREN METHOD: 86 MM/HR (ref 0–36)
EST. GFR  (AFRICAN AMERICAN): >60 ML/MIN/1.73 M^2
EST. GFR  (NON AFRICAN AMERICAN): 54.3 ML/MIN/1.73 M^2
GLUCOSE SERPL-MCNC: 104 MG/DL (ref 70–110)
HCT VFR BLD AUTO: 34 % (ref 37–48.5)
HGB BLD-MCNC: 10.6 G/DL (ref 12–16)
IMM GRANULOCYTES # BLD AUTO: 0.02 K/UL (ref 0–0.04)
IMM GRANULOCYTES NFR BLD AUTO: 0.3 % (ref 0–0.5)
LYMPHOCYTES # BLD AUTO: 1.2 K/UL (ref 1–4.8)
LYMPHOCYTES NFR BLD: 17.4 % (ref 18–48)
MCH RBC QN AUTO: 29.4 PG (ref 27–31)
MCHC RBC AUTO-ENTMCNC: 31.2 G/DL (ref 32–36)
MCV RBC AUTO: 94 FL (ref 82–98)
MONOCYTES # BLD AUTO: 0.5 K/UL (ref 0.3–1)
MONOCYTES NFR BLD: 6.8 % (ref 4–15)
NEUTROPHILS # BLD AUTO: 4.9 K/UL (ref 1.8–7.7)
NEUTROPHILS NFR BLD: 68.9 % (ref 38–73)
NRBC BLD-RTO: 0 /100 WBC
PLATELET # BLD AUTO: 181 K/UL (ref 150–350)
PMV BLD AUTO: 12.5 FL (ref 9.2–12.9)
POTASSIUM SERPL-SCNC: 4.1 MMOL/L (ref 3.5–5.1)
PROT SERPL-MCNC: 7.5 G/DL (ref 6–8.4)
RBC # BLD AUTO: 3.6 M/UL (ref 4–5.4)
SODIUM SERPL-SCNC: 137 MMOL/L (ref 136–145)
VANCOMYCIN TROUGH SERPL-MCNC: 19.4 UG/ML (ref 10–22)
WBC # BLD AUTO: 7.07 K/UL (ref 3.9–12.7)

## 2020-08-10 PROCEDURE — 80053 COMPREHEN METABOLIC PANEL: CPT

## 2020-08-10 PROCEDURE — 85025 COMPLETE CBC W/AUTO DIFF WBC: CPT

## 2020-08-10 PROCEDURE — 80202 ASSAY OF VANCOMYCIN: CPT

## 2020-08-10 PROCEDURE — 85652 RBC SED RATE AUTOMATED: CPT

## 2020-08-10 PROCEDURE — 86140 C-REACTIVE PROTEIN: CPT

## 2020-08-11 ENCOUNTER — TELEPHONE (OUTPATIENT)
Dept: FAMILY MEDICINE | Facility: HOSPITAL | Age: 60
End: 2020-08-11

## 2020-08-11 DIAGNOSIS — R11.0 NAUSEA: Primary | ICD-10-CM

## 2020-08-11 RX ORDER — ONDANSETRON 4 MG/1
8 TABLET, ORALLY DISINTEGRATING ORAL EVERY 8 HOURS PRN
Qty: 15 TABLET | Refills: 0 | Status: SHIPPED | OUTPATIENT
Start: 2020-08-11

## 2020-08-11 NOTE — TELEPHONE ENCOUNTER
Patient called on-call physician for nausea with associated decreased appetite after infusion of abx. She was last seen by Dr. Ambriz due to ankle infection. Patient due to see VIOLETTA Tamayo in our clinic.     Plan    Zofran sent to pharmacy.     Anat Smalls MD  PGY-3  [facility] Family Medicine  08/11/2020

## 2020-08-17 ENCOUNTER — LAB VISIT (OUTPATIENT)
Dept: LAB | Facility: HOSPITAL | Age: 60
End: 2020-08-17
Attending: INTERNAL MEDICINE
Payer: MEDICAID

## 2020-08-17 ENCOUNTER — TELEPHONE (OUTPATIENT)
Dept: INFECTIOUS DISEASES | Facility: CLINIC | Age: 60
End: 2020-08-17

## 2020-08-17 DIAGNOSIS — R78.81 BACTEREMIA: Primary | ICD-10-CM

## 2020-08-17 LAB
ALBUMIN SERPL BCP-MCNC: 3.3 G/DL (ref 3.5–5.2)
ALP SERPL-CCNC: 81 U/L (ref 55–135)
ALT SERPL W/O P-5'-P-CCNC: 13 U/L (ref 10–44)
ANION GAP SERPL CALC-SCNC: 11 MMOL/L (ref 8–16)
AST SERPL-CCNC: 24 U/L (ref 10–40)
BASOPHILS # BLD AUTO: 0.06 K/UL (ref 0–0.2)
BASOPHILS NFR BLD: 1.1 % (ref 0–1.9)
BILIRUB SERPL-MCNC: 0.3 MG/DL (ref 0.1–1)
BUN SERPL-MCNC: 14 MG/DL (ref 8–23)
CALCIUM SERPL-MCNC: 9.7 MG/DL (ref 8.7–10.5)
CHLORIDE SERPL-SCNC: 106 MMOL/L (ref 95–110)
CO2 SERPL-SCNC: 22 MMOL/L (ref 23–29)
CREAT SERPL-MCNC: 1.3 MG/DL (ref 0.5–1.4)
CRP SERPL-MCNC: 12.6 MG/L (ref 0–8.2)
DIFFERENTIAL METHOD: ABNORMAL
EOSINOPHIL # BLD AUTO: 0.7 K/UL (ref 0–0.5)
EOSINOPHIL NFR BLD: 12.7 % (ref 0–8)
ERYTHROCYTE [DISTWIDTH] IN BLOOD BY AUTOMATED COUNT: 13.3 % (ref 11.5–14.5)
ERYTHROCYTE [SEDIMENTATION RATE] IN BLOOD BY WESTERGREN METHOD: 69 MM/HR (ref 0–36)
EST. GFR  (AFRICAN AMERICAN): 51.2 ML/MIN/1.73 M^2
EST. GFR  (NON AFRICAN AMERICAN): 44.4 ML/MIN/1.73 M^2
GLUCOSE SERPL-MCNC: 93 MG/DL (ref 70–110)
HCT VFR BLD AUTO: 33.3 % (ref 37–48.5)
HGB BLD-MCNC: 10.2 G/DL (ref 12–16)
IMM GRANULOCYTES # BLD AUTO: 0.01 K/UL (ref 0–0.04)
IMM GRANULOCYTES NFR BLD AUTO: 0.2 % (ref 0–0.5)
LYMPHOCYTES # BLD AUTO: 0.9 K/UL (ref 1–4.8)
LYMPHOCYTES NFR BLD: 15.4 % (ref 18–48)
MCH RBC QN AUTO: 29.1 PG (ref 27–31)
MCHC RBC AUTO-ENTMCNC: 30.6 G/DL (ref 32–36)
MCV RBC AUTO: 95 FL (ref 82–98)
MONOCYTES # BLD AUTO: 0.5 K/UL (ref 0.3–1)
MONOCYTES NFR BLD: 9.8 % (ref 4–15)
NEUTROPHILS # BLD AUTO: 3.4 K/UL (ref 1.8–7.7)
NEUTROPHILS NFR BLD: 60.8 % (ref 38–73)
NRBC BLD-RTO: 0 /100 WBC
PLATELET # BLD AUTO: 186 K/UL (ref 150–350)
PMV BLD AUTO: 12.6 FL (ref 9.2–12.9)
POTASSIUM SERPL-SCNC: 3.8 MMOL/L (ref 3.5–5.1)
PROT SERPL-MCNC: 7.4 G/DL (ref 6–8.4)
RBC # BLD AUTO: 3.51 M/UL (ref 4–5.4)
SODIUM SERPL-SCNC: 139 MMOL/L (ref 136–145)
VANCOMYCIN TROUGH SERPL-MCNC: 36.4 UG/ML (ref 10–22)
WBC # BLD AUTO: 5.51 K/UL (ref 3.9–12.7)

## 2020-08-17 PROCEDURE — 80202 ASSAY OF VANCOMYCIN: CPT

## 2020-08-17 PROCEDURE — 85652 RBC SED RATE AUTOMATED: CPT

## 2020-08-17 PROCEDURE — 86140 C-REACTIVE PROTEIN: CPT

## 2020-08-17 PROCEDURE — 85025 COMPLETE CBC W/AUTO DIFF WBC: CPT

## 2020-08-17 PROCEDURE — 80053 COMPREHEN METABOLIC PANEL: CPT

## 2020-08-19 ENCOUNTER — LAB VISIT (OUTPATIENT)
Dept: LAB | Facility: HOSPITAL | Age: 60
End: 2020-08-19
Attending: INTERNAL MEDICINE
Payer: MEDICAID

## 2020-08-19 DIAGNOSIS — R78.81 BACTEREMIA: ICD-10-CM

## 2020-08-19 DIAGNOSIS — T81.42XA DEEP POSTOPERATIVE WOUND INFECTION: Primary | ICD-10-CM

## 2020-08-19 LAB
ANION GAP SERPL CALC-SCNC: 12 MMOL/L (ref 8–16)
BUN SERPL-MCNC: 21 MG/DL (ref 8–23)
CALCIUM SERPL-MCNC: 9.3 MG/DL (ref 8.7–10.5)
CHLORIDE SERPL-SCNC: 106 MMOL/L (ref 95–110)
CO2 SERPL-SCNC: 19 MMOL/L (ref 23–29)
CREAT SERPL-MCNC: 1.7 MG/DL (ref 0.5–1.4)
EST. GFR  (AFRICAN AMERICAN): 37 ML/MIN/1.73 M^2
EST. GFR  (NON AFRICAN AMERICAN): 32.1 ML/MIN/1.73 M^2
GLUCOSE SERPL-MCNC: 129 MG/DL (ref 70–110)
POTASSIUM SERPL-SCNC: 4.6 MMOL/L (ref 3.5–5.1)
SODIUM SERPL-SCNC: 137 MMOL/L (ref 136–145)
VANCOMYCIN SERPL-MCNC: 19.7 UG/ML

## 2020-08-19 PROCEDURE — 80202 ASSAY OF VANCOMYCIN: CPT

## 2020-08-19 PROCEDURE — 80048 BASIC METABOLIC PNL TOTAL CA: CPT

## 2020-08-24 ENCOUNTER — TELEPHONE (OUTPATIENT)
Dept: FAMILY MEDICINE | Facility: HOSPITAL | Age: 60
End: 2020-08-24

## 2020-08-24 NOTE — TELEPHONE ENCOUNTER
Advised patient to visit the ED this past Friday 08/21/2020 due to RONEL 2/2 vancomycin infusions. Patient informed me she did not have a ride and would try and go later over the weekend. Called patient today and she informed me she never followed through with the ED visit as well as never went to her appointment for lab draw and PICC line care at [facility] today as well. Called [facility] to follow-up with patient tomorrow. Will need to follow-up with labs.     Vincent Díaz DO  [facility] Family Medicine, PGY-1  08/24/2020

## 2020-08-25 LAB
FUNGUS SPEC CULT: NORMAL

## 2020-08-26 ENCOUNTER — LAB VISIT (OUTPATIENT)
Dept: LAB | Facility: HOSPITAL | Age: 60
End: 2020-08-26
Attending: INTERNAL MEDICINE
Payer: MEDICAID

## 2020-08-26 DIAGNOSIS — R78.81 BACTEREMIA: Primary | ICD-10-CM

## 2020-08-26 LAB
ALBUMIN SERPL BCP-MCNC: 3.3 G/DL (ref 3.5–5.2)
ALP SERPL-CCNC: 82 U/L (ref 55–135)
ALT SERPL W/O P-5'-P-CCNC: 13 U/L (ref 10–44)
ANION GAP SERPL CALC-SCNC: 9 MMOL/L (ref 8–16)
AST SERPL-CCNC: 23 U/L (ref 10–40)
BASOPHILS # BLD AUTO: 0.06 K/UL (ref 0–0.2)
BASOPHILS NFR BLD: 1.1 % (ref 0–1.9)
BILIRUB SERPL-MCNC: 0.3 MG/DL (ref 0.1–1)
BUN SERPL-MCNC: 37 MG/DL (ref 8–23)
CALCIUM SERPL-MCNC: 9.8 MG/DL (ref 8.7–10.5)
CHLORIDE SERPL-SCNC: 105 MMOL/L (ref 95–110)
CO2 SERPL-SCNC: 24 MMOL/L (ref 23–29)
CREAT SERPL-MCNC: 1.9 MG/DL (ref 0.5–1.4)
CRP SERPL-MCNC: 9.4 MG/L (ref 0–8.2)
DIFFERENTIAL METHOD: ABNORMAL
EOSINOPHIL # BLD AUTO: 0.4 K/UL (ref 0–0.5)
EOSINOPHIL NFR BLD: 7.3 % (ref 0–8)
ERYTHROCYTE [DISTWIDTH] IN BLOOD BY AUTOMATED COUNT: 13.2 % (ref 11.5–14.5)
ERYTHROCYTE [SEDIMENTATION RATE] IN BLOOD BY WESTERGREN METHOD: 59 MM/HR (ref 0–36)
EST. GFR  (AFRICAN AMERICAN): 32.3 ML/MIN/1.73 M^2
EST. GFR  (NON AFRICAN AMERICAN): 28.1 ML/MIN/1.73 M^2
GLUCOSE SERPL-MCNC: 100 MG/DL (ref 70–110)
HCT VFR BLD AUTO: 33.3 % (ref 37–48.5)
HGB BLD-MCNC: 10.4 G/DL (ref 12–16)
IMM GRANULOCYTES # BLD AUTO: 0.02 K/UL (ref 0–0.04)
IMM GRANULOCYTES NFR BLD AUTO: 0.4 % (ref 0–0.5)
LYMPHOCYTES # BLD AUTO: 1 K/UL (ref 1–4.8)
LYMPHOCYTES NFR BLD: 18.4 % (ref 18–48)
MCH RBC QN AUTO: 28.9 PG (ref 27–31)
MCHC RBC AUTO-ENTMCNC: 31.2 G/DL (ref 32–36)
MCV RBC AUTO: 93 FL (ref 82–98)
MONOCYTES # BLD AUTO: 0.4 K/UL (ref 0.3–1)
MONOCYTES NFR BLD: 6.2 % (ref 4–15)
NEUTROPHILS # BLD AUTO: 3.8 K/UL (ref 1.8–7.7)
NEUTROPHILS NFR BLD: 66.6 % (ref 38–73)
NRBC BLD-RTO: 0 /100 WBC
PLATELET # BLD AUTO: 179 K/UL (ref 150–350)
PMV BLD AUTO: 12.6 FL (ref 9.2–12.9)
POTASSIUM SERPL-SCNC: 4.3 MMOL/L (ref 3.5–5.1)
PROT SERPL-MCNC: 7.4 G/DL (ref 6–8.4)
RBC # BLD AUTO: 3.6 M/UL (ref 4–5.4)
SODIUM SERPL-SCNC: 138 MMOL/L (ref 136–145)
WBC # BLD AUTO: 5.64 K/UL (ref 3.9–12.7)

## 2020-08-26 PROCEDURE — 85025 COMPLETE CBC W/AUTO DIFF WBC: CPT

## 2020-08-26 PROCEDURE — 86140 C-REACTIVE PROTEIN: CPT

## 2020-08-26 PROCEDURE — 36415 COLL VENOUS BLD VENIPUNCTURE: CPT

## 2020-08-26 PROCEDURE — 80202 ASSAY OF VANCOMYCIN: CPT

## 2020-08-26 PROCEDURE — 80053 COMPREHEN METABOLIC PANEL: CPT

## 2020-08-26 PROCEDURE — 85652 RBC SED RATE AUTOMATED: CPT

## 2020-08-27 ENCOUNTER — TELEPHONE (OUTPATIENT)
Dept: FAMILY MEDICINE | Facility: HOSPITAL | Age: 60
End: 2020-08-27

## 2020-08-27 NOTE — TELEPHONE ENCOUNTER
Patient called back to discuss message left by Dr. Ambriz's office that patient needs to report to the ED for IVFs due to a vancomycin induced RONEL. Discussed case with ID resident and patient has had increasingly worse RONEL with supratherapeutic vancomycin trough. Patient had instructed to go to the ER since 8/21 due to this. Patient did not have transportation last Friday and is documented as stating she would go over the weekend.     Discussed case with patient. She did not go to the ER and repeat labs on 8/26 showed worsening RONEL with Cr 1.9. Recommended patient go to nearest ED for evaluation and IVFs. Patient states she will not have a ride until tomorrow. Reiterated to patient that it is our medical opinion that she needs to go to the ER immediately for management. Patient endorsed understanding.     Anat Smalls MD  PGY-3  [facility] Family Medicine  08/27/2020

## 2020-08-28 ENCOUNTER — HOSPITAL ENCOUNTER (EMERGENCY)
Facility: HOSPITAL | Age: 60
Discharge: HOME | End: 2020-08-28
Attending: EMERGENCY MEDICINE
Payer: MEDICAID

## 2020-08-28 VITALS
SYSTOLIC BLOOD PRESSURE: 147 MMHG | WEIGHT: 175 LBS | HEIGHT: 66 IN | BODY MASS INDEX: 28.12 KG/M2 | DIASTOLIC BLOOD PRESSURE: 112 MMHG | OXYGEN SATURATION: 98 % | RESPIRATION RATE: 20 BRPM | HEART RATE: 80 BPM | TEMPERATURE: 98 F

## 2020-08-28 DIAGNOSIS — T36.8X5A ADVERSE EFFECT OF VANCOMYCIN, INITIAL ENCOUNTER: ICD-10-CM

## 2020-08-28 DIAGNOSIS — N17.9 AKI (ACUTE KIDNEY INJURY): Primary | ICD-10-CM

## 2020-08-28 LAB
ALBUMIN SERPL BCP-MCNC: 3.6 G/DL (ref 3.5–5.2)
ALP SERPL-CCNC: 85 U/L (ref 55–135)
ALT SERPL W/O P-5'-P-CCNC: 11 U/L (ref 10–44)
ANION GAP SERPL CALC-SCNC: 9 MMOL/L (ref 8–16)
AST SERPL-CCNC: 22 U/L (ref 10–40)
BASOPHILS # BLD AUTO: 0.06 K/UL (ref 0–0.2)
BASOPHILS NFR BLD: 1.1 % (ref 0–1.9)
BILIRUB SERPL-MCNC: 0.3 MG/DL (ref 0.1–1)
BUN SERPL-MCNC: 32 MG/DL (ref 8–23)
CALCIUM SERPL-MCNC: 10.1 MG/DL (ref 8.7–10.5)
CHLORIDE SERPL-SCNC: 104 MMOL/L (ref 95–110)
CO2 SERPL-SCNC: 24 MMOL/L (ref 23–29)
CREAT SERPL-MCNC: 1.7 MG/DL (ref 0.5–1.4)
DIFFERENTIAL METHOD: ABNORMAL
EOSINOPHIL # BLD AUTO: 0.3 K/UL (ref 0–0.5)
EOSINOPHIL NFR BLD: 5.3 % (ref 0–8)
ERYTHROCYTE [DISTWIDTH] IN BLOOD BY AUTOMATED COUNT: 13.3 % (ref 11.5–14.5)
EST. GFR  (AFRICAN AMERICAN): 37 ML/MIN/1.73 M^2
EST. GFR  (NON AFRICAN AMERICAN): 32 ML/MIN/1.73 M^2
GLUCOSE SERPL-MCNC: 91 MG/DL (ref 70–110)
HCT VFR BLD AUTO: 33.5 % (ref 37–48.5)
HGB BLD-MCNC: 10.8 G/DL (ref 12–16)
IMM GRANULOCYTES # BLD AUTO: 0.01 K/UL (ref 0–0.04)
IMM GRANULOCYTES NFR BLD AUTO: 0.2 % (ref 0–0.5)
LYMPHOCYTES # BLD AUTO: 1 K/UL (ref 1–4.8)
LYMPHOCYTES NFR BLD: 17 % (ref 18–48)
MCH RBC QN AUTO: 29.3 PG (ref 27–31)
MCHC RBC AUTO-ENTMCNC: 32.2 G/DL (ref 32–36)
MCV RBC AUTO: 91 FL (ref 82–98)
MONOCYTES # BLD AUTO: 0.4 K/UL (ref 0.3–1)
MONOCYTES NFR BLD: 7 % (ref 4–15)
NEUTROPHILS # BLD AUTO: 4 K/UL (ref 1.8–7.7)
NEUTROPHILS NFR BLD: 69.4 % (ref 38–73)
NRBC BLD-RTO: 0 /100 WBC
PLATELET # BLD AUTO: 177 K/UL (ref 150–350)
PMV BLD AUTO: 13 FL (ref 9.2–12.9)
POTASSIUM SERPL-SCNC: 4.8 MMOL/L (ref 3.5–5.1)
PROT SERPL-MCNC: 7.7 G/DL (ref 6–8.4)
RBC # BLD AUTO: 3.69 M/UL (ref 4–5.4)
SODIUM SERPL-SCNC: 137 MMOL/L (ref 136–145)
WBC # BLD AUTO: 5.71 K/UL (ref 3.9–12.7)

## 2020-08-28 PROCEDURE — 96360 HYDRATION IV INFUSION INIT: CPT

## 2020-08-28 PROCEDURE — 99284 EMERGENCY DEPT VISIT MOD MDM: CPT | Mod: 25

## 2020-08-28 PROCEDURE — 96361 HYDRATE IV INFUSION ADD-ON: CPT

## 2020-08-28 PROCEDURE — 85025 COMPLETE CBC W/AUTO DIFF WBC: CPT

## 2020-08-28 PROCEDURE — 25000003 PHARM REV CODE 250: Performed by: EMERGENCY MEDICINE

## 2020-08-28 PROCEDURE — 80053 COMPREHEN METABOLIC PANEL: CPT

## 2020-08-28 RX ORDER — ALPRAZOLAM 0.25 MG/1
0.5 TABLET ORAL
Status: COMPLETED | OUTPATIENT
Start: 2020-08-28 | End: 2020-08-28

## 2020-08-28 RX ADMIN — SODIUM CHLORIDE 2000 ML: 0.9 INJECTION, SOLUTION INTRAVENOUS at 03:08

## 2020-08-28 RX ADMIN — ALPRAZOLAM 0.5 MG: 0.25 TABLET ORAL at 01:08

## 2020-08-28 NOTE — ED NOTES
2 unsuccessful attempts at blood draw by RN. RN attempted to draw blood from PICC line. No blood return noted. PICC line flushed without difficulty. Pt tolerated well. Lab at bedside for blood draw.

## 2020-08-28 NOTE — TELEPHONE ENCOUNTER
Called patient 3 times at the two different numbers listed in her chart. Left a message advising Ms. Kenney to make a visit to the ED due to worsening RONEL. Will follow-up tomorrow.      Vincent Díaz DO  [facility] Family Medicine, PGY-1  08/27/2020

## 2020-08-28 NOTE — TELEPHONE ENCOUNTER
Patient called again about missed call from Dr. Ambriz's office. Discussed current recommendation is to go to nearest ER for IVFs and evaluation. Patient asked if drinking water and eating appropriate. Reiterated that drinking water will not be detrimental, but she likely needs IV replenishment in order to truly correct her RONEL. Patient endorsed understanding.     Anat Smalls MD  PGY-3  [facility] Family Medicine  08/27/2020

## 2020-08-28 NOTE — PROVIDER PROGRESS NOTES - EMERGENCY DEPT.
"Encounter Date: 8/28/2020    ED Physician Progress Notes             This is an assumption of care note.     Upon shift change, the patient was transferred to me from Dr. Richardson @ 4:32 PM in stable condition.     Patient presented to ED with chief complaint of abnormal lab.  Patient recently finished a course of vancomycin for post-op infection, and PCP checked labs revealing RONEL, with Cr 1.9.  Repeat Cr 1.7. Initial plan included recheck BMP after 2 L IVF.  However, on reassessment, patient states her mother was recently diagnosed with a brain mass and is currently admitted to [facility]. She respectfully requests discharge so she can be with her mother and family. Patient was instructed to f/u closely with her PCP for Cr recheck or return to ED for any concerns.      VITALS:  Vitals:    08/28/20 1200 08/28/20 1512 08/28/20 1522   BP: (!) 159/87 (!) 169/83 (!) 179/81   Pulse: 78 75 72   Resp: 20 20 20   Temp: 98.4 °F (36.9 °C)  98 °F (36.7 °C)   TempSrc: Oral  Oral   SpO2: 95% 98% 97%   Weight: 79.4 kg (175 lb)     Height: 5' 6" (1.676 m)             IMAGING:  Imaging Results    None           LABS:  Labs Reviewed   CBC W/ AUTO DIFFERENTIAL - Abnormal; Notable for the following components:       Result Value    RBC 3.69 (*)     Hemoglobin 10.8 (*)     Hematocrit 33.5 (*)     MPV 13.0 (*)     Lymph% 17.0 (*)     All other components within normal limits   COMPREHENSIVE METABOLIC PANEL - Abnormal; Notable for the following components:    BUN, Bld 32 (*)     Creatinine 1.7 (*)     eGFR if  37 (*)     eGFR if non  32 (*)     All other components within normal limits   BASIC METABOLIC PANEL         MEDICATIONS:  Medications   ALPRAZolam tablet 0.5 mg (0.5 mg Oral Given 8/28/20 1337)   sodium chloride 0.9% bolus 2,000 mL (2,000 mLs Intravenous New Bag 8/28/20 1508)         IMPRESSION:  1. RONEL (acute kidney injury)    2. Adverse effect of vancomycin, initial encounter           DISCHARGE " MEDICATIONS:  Current Discharge Medication List            DISPOSITION:  D/C in stable condition.

## 2020-08-28 NOTE — ED PROVIDER NOTES
Encounter Date: 8/28/2020    SCRIBE #1 NOTE: I, Jessica Pritchett, am scribing for, and in the presence of,  Fuentes Richardson MD. I have scribed the entire note.       History     Chief Complaint   Patient presents with    abnormal labs     pt. being treated for post-op leg wound infection with iv vancomycin. pt. was called by doctor yesterday after recent labs drawn  (8/26)  and instructed to come to ED  for iv fluids secondary to cr-1.9     Time seen by provider: 12:46 PM    This is a 60 y.o. female who was referred to ED by PCP for elevated creatinine on lab work that was drawn on Wednesday (08/26). She complains of lower back pain, generalized body aches, and chills. The patient denies dysuria, hematuria, increased urination, or any other concerning symptoms. She had been on vancomycin for a staph infection, and she reports she had had N/V while on vancomycin. The patient denies any prior kidney problems. She reports a history of sciatica but notes her pain today is worse than previous sciatica pain.    The history is provided by the patient.     Review of patient's allergies indicates:   Allergen Reactions    Augmentin [amoxicillin-pot clavulanate] Other (See Comments)     Stomach cramps     Past Medical History:   Diagnosis Date    Anxiety     Depression     Hypertension      Past Surgical History:   Procedure Laterality Date    HYSTERECTOMY      IRRIGATION AND DEBRIDEMENT OF LOWER EXTREMITY Left 7/24/2020    Procedure: LEFT ANKLE INFECTION IRRIGATION AND DEBRIDEMENT, possible External fixation;  Surgeon: Triston Naidu IV, MD;  Location: [facility] OR;  Service: Orthopedics;  Laterality: Left;  correct card and give to Shari  Pulse Lavage and large bone or ortho minor tray- to start case  Zac- Styker to bring ankle set and ex fix - needed in case of removal of harware-Confrimed zac- AM 07/23    OPEN REDUCTION AND INTERNAL FIXATION (ORIF) OF INJURY OF ANKLE Left 7/7/2020    Procedure: ORIF, ANKLE;   Surgeon: Triston Naidu IV, MD;  Location: [facility] OR;  Service: Orthopedics;  Laterality: Left;  GENERAL + REGIONAL  ORTHO MAJOR TRAY  BONE FOAM RAMP  FLUOROSCOPY  Hair Lopez confirmed CW 7/6     Family History   Problem Relation Age of Onset    Hypertension Mother     Hyperlipidemia Mother     Alcohol abuse Mother     Cancer Father      Social History     Tobacco Use    Smoking status: Former Smoker   Substance Use Topics    Alcohol use: No    Drug use: No     Review of Systems   Constitutional: Positive for chills.   Genitourinary: Negative for dysuria, frequency and hematuria.   Musculoskeletal: Positive for back pain and myalgias.   All other systems reviewed and are negative.      Physical Exam     Initial Vitals [08/28/20 1200]   BP Pulse Resp Temp SpO2   (!) 159/87 78 20 98.4 °F (36.9 °C) 95 %      MAP       --         Physical Exam    Nursing note and vitals reviewed.  Constitutional: She appears well-developed and well-nourished. She is not diaphoretic. No distress.   HENT:   Head: Normocephalic and atraumatic.   Right Ear: Tympanic membrane normal.   Left Ear: Tympanic membrane normal.   Mouth/Throat: Oropharynx is clear and moist.   Eyes: Conjunctivae and EOM are normal. Pupils are equal, round, and reactive to light.   Neck: Normal range of motion. Neck supple.   Cardiovascular: Normal rate, regular rhythm and normal heart sounds. Exam reveals no gallop and no friction rub.    No murmur heard.  Pulmonary/Chest: Breath sounds normal. She has no wheezes. She has no rhonchi. She has no rales.   Abdominal: Soft. Bowel sounds are normal. There is no abdominal tenderness. There is no rebound and no guarding.   Musculoskeletal: Normal range of motion. Tenderness (TTP to L lower L-spine paraspinus muscle) present. No edema.   Lymphadenopathy:     She has no cervical adenopathy.   Neurological: She is alert and oriented to person, place, and time. She has normal strength.   Skin: Skin is warm and  dry. Capillary refill takes less than 2 seconds. No rash noted.         ED Course   Procedures  Labs Reviewed   CBC W/ AUTO DIFFERENTIAL - Abnormal; Notable for the following components:       Result Value    RBC 3.69 (*)     Hemoglobin 10.8 (*)     Hematocrit 33.5 (*)     MPV 13.0 (*)     Lymph% 17.0 (*)     All other components within normal limits   COMPREHENSIVE METABOLIC PANEL - Abnormal; Notable for the following components:    BUN, Bld 32 (*)     Creatinine 1.7 (*)     eGFR if  37 (*)     eGFR if non  32 (*)     All other components within normal limits          Imaging Results    None          Medical Decision Making:   History:   Old Medical Records: I decided to obtain old medical records.  Old Records Summarized: records from clinic visits.       <> Summary of Records: 08/03 - PT had F/U with [place] D[place] for infection [place]. Cultures showed MRSA and Enterobacter cloacae  Clinical Tests:   Lab Tests: Reviewed and Ordered  ED Management:  60-year-old female with a elevation of her creatinine.  This is most likely due to recently being on vancomycin.  Lab work done today shows a creatinine of 1.7.  Patient will be given 2 L of IV fluid and then have this recheck.  Care of the patient will be turned over to Dr. Ventura at 4:00 p.m. shift change awaiting repeat lab results.                                 Clinical Impression:       ICD-10-CM ICD-9-CM   1. RONEL (acute kidney injury)  N17.9 584.9                     I, Dr. Fuentes Richardson, personally performed the services described in this documentation. All medical record entries made by the scribe were at my direction and in my presence. I have reviewed the chart and agree that the record reflects my personal performance and is accurate and complete. Fuentes Richardson MD.  3:46 PM 08/28/2020               Fuentes Richardson MD  08/29/20 0601

## 2020-08-31 ENCOUNTER — LAB VISIT (OUTPATIENT)
Dept: LAB | Facility: HOSPITAL | Age: 60
End: 2020-08-31
Attending: INTERNAL MEDICINE
Payer: MEDICAID

## 2020-08-31 DIAGNOSIS — R78.81 BACTEREMIA: Primary | ICD-10-CM

## 2020-08-31 LAB
ALBUMIN SERPL BCP-MCNC: 3.7 G/DL (ref 3.5–5.2)
ALP SERPL-CCNC: 76 U/L (ref 55–135)
ALT SERPL W/O P-5'-P-CCNC: 13 U/L (ref 10–44)
ANION GAP SERPL CALC-SCNC: 10 MMOL/L (ref 8–16)
AST SERPL-CCNC: 22 U/L (ref 10–40)
BASOPHILS # BLD AUTO: 0.05 K/UL (ref 0–0.2)
BASOPHILS NFR BLD: 0.9 % (ref 0–1.9)
BILIRUB SERPL-MCNC: 0.3 MG/DL (ref 0.1–1)
BUN SERPL-MCNC: 32 MG/DL (ref 8–23)
CALCIUM SERPL-MCNC: 9.7 MG/DL (ref 8.7–10.5)
CHLORIDE SERPL-SCNC: 104 MMOL/L (ref 95–110)
CO2 SERPL-SCNC: 23 MMOL/L (ref 23–29)
CREAT SERPL-MCNC: 1.7 MG/DL (ref 0.5–1.4)
CRP SERPL-MCNC: 3.4 MG/L (ref 0–8.2)
DIFFERENTIAL METHOD: ABNORMAL
EOSINOPHIL # BLD AUTO: 0.2 K/UL (ref 0–0.5)
EOSINOPHIL NFR BLD: 4 % (ref 0–8)
ERYTHROCYTE [DISTWIDTH] IN BLOOD BY AUTOMATED COUNT: 13.7 % (ref 11.5–14.5)
ERYTHROCYTE [SEDIMENTATION RATE] IN BLOOD BY WESTERGREN METHOD: 54 MM/HR (ref 0–36)
EST. GFR  (AFRICAN AMERICAN): 37 ML/MIN/1.73 M^2
EST. GFR  (NON AFRICAN AMERICAN): 32.1 ML/MIN/1.73 M^2
GLUCOSE SERPL-MCNC: 103 MG/DL (ref 70–110)
HCT VFR BLD AUTO: 34.4 % (ref 37–48.5)
HGB BLD-MCNC: 10.6 G/DL (ref 12–16)
IMM GRANULOCYTES # BLD AUTO: 0.01 K/UL (ref 0–0.04)
IMM GRANULOCYTES NFR BLD AUTO: 0.2 % (ref 0–0.5)
LYMPHOCYTES # BLD AUTO: 1.7 K/UL (ref 1–4.8)
LYMPHOCYTES NFR BLD: 32.6 % (ref 18–48)
MCH RBC QN AUTO: 29 PG (ref 27–31)
MCHC RBC AUTO-ENTMCNC: 30.8 G/DL (ref 32–36)
MCV RBC AUTO: 94 FL (ref 82–98)
MONOCYTES # BLD AUTO: 0.4 K/UL (ref 0.3–1)
MONOCYTES NFR BLD: 8.1 % (ref 4–15)
NEUTROPHILS # BLD AUTO: 2.9 K/UL (ref 1.8–7.7)
NEUTROPHILS NFR BLD: 54.2 % (ref 38–73)
NRBC BLD-RTO: 0 /100 WBC
PLATELET # BLD AUTO: 178 K/UL (ref 150–350)
PMV BLD AUTO: 13 FL (ref 9.2–12.9)
POTASSIUM SERPL-SCNC: 4.4 MMOL/L (ref 3.5–5.1)
PROT SERPL-MCNC: 7.5 G/DL (ref 6–8.4)
RBC # BLD AUTO: 3.66 M/UL (ref 4–5.4)
SODIUM SERPL-SCNC: 137 MMOL/L (ref 136–145)
VANCOMYCIN SERPL-MCNC: 1.2 UG/ML
WBC # BLD AUTO: 5.28 K/UL (ref 3.9–12.7)

## 2020-08-31 PROCEDURE — 85025 COMPLETE CBC W/AUTO DIFF WBC: CPT

## 2020-08-31 PROCEDURE — 80202 ASSAY OF VANCOMYCIN: CPT

## 2020-08-31 PROCEDURE — 86140 C-REACTIVE PROTEIN: CPT

## 2020-08-31 PROCEDURE — 80053 COMPREHEN METABOLIC PANEL: CPT

## 2020-08-31 PROCEDURE — 85652 RBC SED RATE AUTOMATED: CPT

## 2020-09-03 ENCOUNTER — LAB VISIT (OUTPATIENT)
Dept: LAB | Facility: HOSPITAL | Age: 60
End: 2020-09-03
Attending: INTERNAL MEDICINE
Payer: MEDICAID

## 2020-09-03 DIAGNOSIS — T81.42XA DEEP POSTOPERATIVE WOUND INFECTION: Primary | ICD-10-CM

## 2020-09-03 LAB
ANION GAP SERPL CALC-SCNC: 10 MMOL/L (ref 8–16)
BUN SERPL-MCNC: 36 MG/DL (ref 8–23)
CALCIUM SERPL-MCNC: 9.1 MG/DL (ref 8.7–10.5)
CHLORIDE SERPL-SCNC: 103 MMOL/L (ref 95–110)
CO2 SERPL-SCNC: 24 MMOL/L (ref 23–29)
CREAT SERPL-MCNC: 1.8 MG/DL (ref 0.5–1.4)
EST. GFR  (AFRICAN AMERICAN): 34.5 ML/MIN/1.73 M^2
EST. GFR  (NON AFRICAN AMERICAN): 29.9 ML/MIN/1.73 M^2
GLUCOSE SERPL-MCNC: 99 MG/DL (ref 70–110)
POTASSIUM SERPL-SCNC: 4.6 MMOL/L (ref 3.5–5.1)
SODIUM SERPL-SCNC: 137 MMOL/L (ref 136–145)

## 2020-09-03 PROCEDURE — 80048 BASIC METABOLIC PNL TOTAL CA: CPT

## 2020-09-03 PROCEDURE — 36415 COLL VENOUS BLD VENIPUNCTURE: CPT

## 2020-09-04 ENCOUNTER — LAB VISIT (OUTPATIENT)
Dept: LAB | Facility: HOSPITAL | Age: 60
End: 2020-09-04
Attending: INTERNAL MEDICINE
Payer: MEDICAID

## 2020-09-04 DIAGNOSIS — T81.42XA DEEP POSTOPERATIVE WOUND INFECTION: Primary | ICD-10-CM

## 2020-09-04 LAB — VANCOMYCIN TROUGH SERPL-MCNC: 10.9 UG/ML (ref 10–22)

## 2020-09-04 PROCEDURE — 80202 ASSAY OF VANCOMYCIN: CPT

## 2020-09-08 ENCOUNTER — LAB VISIT (OUTPATIENT)
Dept: LAB | Facility: HOSPITAL | Age: 60
End: 2020-09-08
Attending: INTERNAL MEDICINE
Payer: MEDICAID

## 2020-09-08 DIAGNOSIS — T81.42XA DEEP POSTOPERATIVE WOUND INFECTION: ICD-10-CM

## 2020-09-08 DIAGNOSIS — R78.81 BACTEREMIA: Primary | ICD-10-CM

## 2020-09-08 LAB
ALBUMIN SERPL BCP-MCNC: 3.7 G/DL (ref 3.5–5.2)
ALP SERPL-CCNC: 72 U/L (ref 55–135)
ALT SERPL W/O P-5'-P-CCNC: 16 U/L (ref 10–44)
ANION GAP SERPL CALC-SCNC: 8 MMOL/L (ref 8–16)
AST SERPL-CCNC: 21 U/L (ref 10–40)
BASOPHILS # BLD AUTO: 0.05 K/UL (ref 0–0.2)
BASOPHILS NFR BLD: 0.9 % (ref 0–1.9)
BILIRUB SERPL-MCNC: 0.2 MG/DL (ref 0.1–1)
BUN SERPL-MCNC: 41 MG/DL (ref 8–23)
CALCIUM SERPL-MCNC: 9.5 MG/DL (ref 8.7–10.5)
CHLORIDE SERPL-SCNC: 103 MMOL/L (ref 95–110)
CO2 SERPL-SCNC: 26 MMOL/L (ref 23–29)
CREAT SERPL-MCNC: 2.4 MG/DL (ref 0.5–1.4)
CRP SERPL-MCNC: 6.6 MG/L (ref 0–8.2)
DIFFERENTIAL METHOD: ABNORMAL
EOSINOPHIL # BLD AUTO: 0.3 K/UL (ref 0–0.5)
EOSINOPHIL NFR BLD: 5.4 % (ref 0–8)
ERYTHROCYTE [DISTWIDTH] IN BLOOD BY AUTOMATED COUNT: 13.7 % (ref 11.5–14.5)
ERYTHROCYTE [SEDIMENTATION RATE] IN BLOOD BY WESTERGREN METHOD: 59 MM/HR (ref 0–36)
EST. GFR  (AFRICAN AMERICAN): 24.4 ML/MIN/1.73 M^2
EST. GFR  (NON AFRICAN AMERICAN): 21.2 ML/MIN/1.73 M^2
GLUCOSE SERPL-MCNC: 91 MG/DL (ref 70–110)
HCT VFR BLD AUTO: 33.5 % (ref 37–48.5)
HGB BLD-MCNC: 10.6 G/DL (ref 12–16)
IMM GRANULOCYTES # BLD AUTO: 0.02 K/UL (ref 0–0.04)
IMM GRANULOCYTES NFR BLD AUTO: 0.4 % (ref 0–0.5)
LYMPHOCYTES # BLD AUTO: 1 K/UL (ref 1–4.8)
LYMPHOCYTES NFR BLD: 18.2 % (ref 18–48)
MCH RBC QN AUTO: 29.4 PG (ref 27–31)
MCHC RBC AUTO-ENTMCNC: 31.6 G/DL (ref 32–36)
MCV RBC AUTO: 93 FL (ref 82–98)
MONOCYTES # BLD AUTO: 0.5 K/UL (ref 0.3–1)
MONOCYTES NFR BLD: 8.4 % (ref 4–15)
NEUTROPHILS # BLD AUTO: 3.6 K/UL (ref 1.8–7.7)
NEUTROPHILS NFR BLD: 66.7 % (ref 38–73)
NRBC BLD-RTO: 0 /100 WBC
PLATELET # BLD AUTO: 131 K/UL (ref 150–350)
PMV BLD AUTO: 13.6 FL (ref 9.2–12.9)
POTASSIUM SERPL-SCNC: 4.9 MMOL/L (ref 3.5–5.1)
PROT SERPL-MCNC: 7.6 G/DL (ref 6–8.4)
RBC # BLD AUTO: 3.61 M/UL (ref 4–5.4)
SODIUM SERPL-SCNC: 137 MMOL/L (ref 136–145)
VANCOMYCIN TROUGH SERPL-MCNC: 18.9 UG/ML (ref 10–22)
WBC # BLD AUTO: 5.37 K/UL (ref 3.9–12.7)

## 2020-09-08 PROCEDURE — 86140 C-REACTIVE PROTEIN: CPT

## 2020-09-08 PROCEDURE — 80053 COMPREHEN METABOLIC PANEL: CPT

## 2020-09-08 PROCEDURE — 85652 RBC SED RATE AUTOMATED: CPT

## 2020-09-08 PROCEDURE — 80202 ASSAY OF VANCOMYCIN: CPT

## 2020-09-08 PROCEDURE — 85025 COMPLETE CBC W/AUTO DIFF WBC: CPT

## 2020-09-25 DIAGNOSIS — S82.852D DISPLACED TRIMALLEOLAR FRACTURE OF LEFT LOWER LEG, SUBSEQUENT ENCOUNTER FOR CLOSED FRACTURE WITH ROUTINE HEALING: Primary | ICD-10-CM

## 2020-09-25 LAB
ACID FAST MOD KINY STN SPEC: NORMAL
MYCOBACTERIUM SPEC QL CULT: NORMAL

## 2020-09-28 ENCOUNTER — HOSPITAL ENCOUNTER (OUTPATIENT)
Dept: RADIOLOGY | Facility: HOSPITAL | Age: 60
Discharge: HOME | End: 2020-09-28
Attending: ORTHOPAEDIC SURGERY
Payer: MEDICAID

## 2020-09-28 DIAGNOSIS — T84.84XA PAINFUL ORTHOPAEDIC HARDWARE: Primary | ICD-10-CM

## 2020-09-28 DIAGNOSIS — T81.42XA DEEP POSTOPERATIVE WOUND INFECTION: ICD-10-CM

## 2020-09-28 DIAGNOSIS — S82.852A CLOSED TRIMALLEOLAR FRACTURE OF LEFT ANKLE, INITIAL ENCOUNTER: ICD-10-CM

## 2020-09-28 DIAGNOSIS — S82.852D DISPLACED TRIMALLEOLAR FRACTURE OF LEFT LOWER LEG, SUBSEQUENT ENCOUNTER FOR CLOSED FRACTURE WITH ROUTINE HEALING: ICD-10-CM

## 2020-09-28 PROCEDURE — 73610 XR ANKLE COMPLETE 3 VIEW LEFT: ICD-10-PCS | Mod: 26,LT,, | Performed by: RADIOLOGY

## 2020-09-28 PROCEDURE — 73610 X-RAY EXAM OF ANKLE: CPT | Mod: 26,LT,, | Performed by: RADIOLOGY

## 2020-09-28 PROCEDURE — 73610 X-RAY EXAM OF ANKLE: CPT | Mod: TC,FY,LT

## 2020-10-01 ENCOUNTER — TELEPHONE (OUTPATIENT)
Dept: SURGERY | Facility: HOSPITAL | Age: 60
End: 2020-10-01

## 2020-10-01 NOTE — PLAN OF CARE
Spoke to patient, she will go for covid screening on Saturday at [facility] in [facility]. Orders in the system.

## 2020-10-02 ENCOUNTER — ANESTHESIA EVENT (OUTPATIENT)
Dept: SURGERY | Facility: HOSPITAL | Age: 60
End: 2020-10-02
Payer: MEDICAID

## 2020-10-02 ENCOUNTER — HOSPITAL ENCOUNTER (OUTPATIENT)
Dept: PREADMISSION TESTING | Facility: HOSPITAL | Age: 60
Discharge: HOME | End: 2020-10-02
Attending: ORTHOPAEDIC SURGERY
Payer: MEDICAID

## 2020-10-02 VITALS
DIASTOLIC BLOOD PRESSURE: 74 MMHG | BODY MASS INDEX: 27 KG/M2 | HEIGHT: 66 IN | RESPIRATION RATE: 16 BRPM | SYSTOLIC BLOOD PRESSURE: 135 MMHG | OXYGEN SATURATION: 97 % | WEIGHT: 168 LBS | HEART RATE: 61 BPM

## 2020-10-02 RX ORDER — SODIUM CHLORIDE, SODIUM LACTATE, POTASSIUM CHLORIDE, CALCIUM CHLORIDE 600; 310; 30; 20 MG/100ML; MG/100ML; MG/100ML; MG/100ML
INJECTION, SOLUTION INTRAVENOUS CONTINUOUS
Status: CANCELLED | OUTPATIENT
Start: 2020-10-02

## 2020-10-02 RX ORDER — LIDOCAINE HYDROCHLORIDE 10 MG/ML
1 INJECTION, SOLUTION EPIDURAL; INFILTRATION; INTRACAUDAL; PERINEURAL ONCE
Status: CANCELLED | OUTPATIENT
Start: 2020-10-02 | End: 2020-10-02

## 2020-10-02 NOTE — DISCHARGE INSTRUCTIONS
Your surgery is scheduled for 10/6/20.    Please report to Front Lobby on the 1st Floor at 5:30 a.m.    THIS TIME IS SUBJECT TO CHANGE.  YOU WILL RECEIVE A PHONE CALL THE DAY BEFORE SURGERY BY 3:30 PM TO CONFIRM YOUR TIME OF ARRIVAL.  IF YOU HAVE NOT RECEIVED A PHONE CALL BY 3:30 PM THE DAY BEFORE YOUR SURGERY PLEASE CALL [phone]     INSTRUCTIONS IMPORTANT!!!  ¨ Do not eat or drink after 12 midnight-including water. OK to brush teeth, no   gum, candy or mints!          ____  Proceed to [facility] on 10/2/20 for additional testing.        ____  Do not wear makeup, including mascara.  ____  No powder, lotions or creams to surgical area.  ____  Please remove all jewelry, including piercings and leave at home.  ____  No money or valuables needed. Please leave at home.  ____  Please bring any documents given by your doctor.  ____  If going home the same day, arrange for a ride home. You will not be able to             drive if Anesthesia was used.  ____  Wear loose fitting clothing. Allow for dressings, bandages.  ____  Stop Aspirin, Ibuprofen, Motrin and Aleve at least 3-5 days before surgery, unless otherwise instructed by your doctor, or the nurse.   You MAY use Tylenol/acetaminophen until day of surgery.  ____  Wash the surgical area with Hibiclens the night before surgery, and again the             morning of surgery.  Be sure to rinse hibiclens off completely (if instructed by   nurse).  ____  If you take diabetic medication, do not take am of surgery unless instructed by Doctor.  ____  Call MD for temperature above 101 degrees or any other signs of infection such as Urinary (bladder) infection, Upper respiratory infection, skin boils, etc.  ____ Stop taking any Fish Oil supplement or any Vitamins that contain Vitamin E at least 5 days prior to surgery.  ____ Do Not wear your contact lenses the day of your procedure.  You may wear your glasses.      ____Do not shave surgical site for 3 days  prior to surgery.  ____ Practice Good hand washing before, during, and after procedure.      I have read or had read and explained to me, and understand the above information.  Additional comments or instructions:  For additional questions call [phone]      ANESTHESIA SIDE EFFECTS  -For the first 24 hours after surgery:  Do not drive, use heavy equipment, make important decisions, or drink alcohol  -It is normal to feel sleepy for several hours.  Rest until you are more awake.  -Have someone stay with you, if needed.  They can watch for problems and help keep you safe.  -Some possible post anesthesia side effects include: nausea and vomiting, sore throat and hoarseness, sleepiness, and dizziness.        Pre-Op Bathing Instructions    Before surgery, you can play an important role in your own health.    Because skin is not sterile, we need to be sure that your skin is as free of germs as possible. By following the instructions below, you can reduce the number of germs on your skin before surgery.    IMPORTANT: You will need to shower with a special soap called Hibiclens*, available at any pharmacy.  If you are allergic to Chlorhexidine (the antiseptic in Hibiclens), use an antibacterial soap such as Dial Soap for your preoperative shower.  You will shower with Hibiclens both the night before your surgery and the morning of your surgery.  Do not use Hibiclens on the head, face or genitals to avoid injury to those areas.    STEP #1: THE NIGHT BEFORE YOUR SURGERY     1. Do not shave the area of your body where your surgery will be performed.  2. Shower and wash your hair and body as usual with your normal soap and shampoo.  3. Rinse your hair and body thoroughly after you shower to remove all soap residue.  4. With your hand, apply one packet of Hibiclens soap to the surgical site.   5. Wash the site gently for five (5) minutes. Do not scrub your skin too hard.   6. Do not wash with your regular soap after Hibiclens is  used.  7. Rinse your body thoroughly.  8. Pat yourself dry with a clean, soft towel.  9. Do not use lotion, cream, or powder.  10. Wear clean clothes.    STEP #2: THE MORNING OF YOUR SURGERY     1. Repeat Step #1.    * Not to be used by people allergic to Chlorhexidine.

## 2020-10-02 NOTE — PRE-PROCEDURE INSTRUCTIONS
[facility]  [phone]    Allergies, medical, surgical, family and psychosocial histories reviewed with patient. Periop plan of care reviewed. Preop instructions given, including medications to take and to hold. Hibiclens soap and instructions on use given. Time allotted for questions to be addressed.  Patient verbalized understanding.

## 2020-10-02 NOTE — ANESTHESIA PREPROCEDURE EVALUATION
10/02/2020  Taty Kenney is a 60 y.o., female scheduled for removal of hardware from left ankle on 10/6/2020.    Past Medical History:   Diagnosis Date    Anxiety     Depression     Hypertension      Past Surgical History:   Procedure Laterality Date    HYSTERECTOMY      IRRIGATION AND DEBRIDEMENT OF LOWER EXTREMITY Left 7/24/2020    Procedure: LEFT ANKLE INFECTION IRRIGATION AND DEBRIDEMENT, possible External fixation;  Surgeon: Triston Naidu IV, MD;  Location: [facility] OR;  Service: Orthopedics;  Laterality: Left;  correct card and give to Shari  Pulse Lavage and large bone or ortho minor tray- to start case  Zac- Styker to bring ankle set and ex fix - needed in case of removal of harware-Confrimed zac- AM 07/23    OPEN REDUCTION AND INTERNAL FIXATION (ORIF) OF INJURY OF ANKLE Left 7/7/2020    Procedure: ORIF, ANKLE;  Surgeon: Triston Naidu IV, MD;  Location: [facility] OR;  Service: Orthopedics;  Laterality: Left;  GENERAL + REGIONAL  ORTHO MAJOR TRAY  BONE FOAM RAMP  FLUOROSCOPY  Hair Lopez confirmed  7/6     Anesthesia Evaluation    I have reviewed the Patient Summary Reports.    I have reviewed the Nursing Notes.    I have reviewed the Medications.     Review of Systems  Anesthesia Hx:  Hx of Anesthetic complications PONV Denies Family Hx of Anesthesia complications.    Social:  Former Smoker, No Alcohol Use    Hematology/Oncology:  Hematology Normal        Cardiovascular:   Exercise tolerance: good Hypertension, well controlled  Denies Angina.        Pulmonary:  Pulmonary Normal  Denies Shortness of breath.    Renal/:   Chronic Renal Disease (recent RONEL)    Hepatic/GI:  Hepatic/GI Normal    Neurological:  Neurology Normal    Endocrine:  Endocrine Normal    Psych:   depression          Physical Exam  General:  Well nourished    Airway/Jaw/Neck:  Airway Findings: Mouth Opening:  Normal Tongue: Normal  General Airway Assessment: Adult  Mallampati: II  TM Distance: Normal, at least 6 cm       Chest/Lungs:  Chest/Lungs Findings: Clear to auscultation, Normal Respiratory Rate     Heart/Vascular:  Heart Findings: Rate: Normal  Sounds: Normal        Mental Status:  Mental Status Findings:  Cooperative, Alert and Oriented       EKG 7/6/2020  Normal sinus rhythm   incorrect V2 placement   Normal ECG   When compared with ECG of 31-AUG-2018 19:55,   No significant change was found       Anesthesia Plan  Type of Anesthesia, risks & benefits discussed:  Anesthesia Type:  general  Patient's Preference:   Intra-op Monitoring Plan: standard ASA monitors  Intra-op Monitoring Plan Comments:   Post Op Pain Control Plan: multimodal analgesia  Post Op Pain Control Plan Comments:   Induction:   IV  Beta Blocker:         Informed Consent:    ASA Score: 2     Day of Surgery Review of History & Physical:        Anesthesia Plan Notes: Anesthesia consent to be signed prior to procedure on 10/6/2020  Covid testing scheduled 10/3            Ready For Surgery From Anesthesia Perspective.

## 2020-10-03 ENCOUNTER — LAB VISIT (OUTPATIENT)
Dept: URGENT CARE | Facility: CLINIC | Age: 60
End: 2020-10-03
Payer: MEDICAID

## 2020-10-03 VITALS — OXYGEN SATURATION: 98 % | TEMPERATURE: 98 F | HEART RATE: 74 BPM

## 2020-10-03 DIAGNOSIS — Z01.818 PREOP TESTING: ICD-10-CM

## 2020-10-03 PROCEDURE — U0003 INFECTIOUS AGENT DETECTION BY NUCLEIC ACID (DNA OR RNA); SEVERE ACUTE RESPIRATORY SYNDROME CORONAVIRUS 2 (SARS-COV-2) (CORONAVIRUS DISEASE [COVID-19]), AMPLIFIED PROBE TECHNIQUE, MAKING USE OF HIGH THROUGHPUT TECHNOLOGIES AS DESCRIBED BY CMS-2020-01-R: HCPCS

## 2020-10-04 LAB — SARS-COV-2 RNA RESP QL NAA+PROBE: NOT DETECTED

## 2020-10-05 NOTE — H&P
History of Present Illness  60-year-old female returns in follow-up of her left ankle problem. The patient had an initial surgery on 7/7/2020 which was complicated by postoperative deep infection which was treated with irrigation and debridement with IV antibiotics on 7/24/2020. She is now 10 weeks status post her irrigation debridement and start of IV antibiotics. At this time wound has improved and completely closed with no evidence of active infection. She completed her course of IV antibiotics and has been on suppressive oral antibiotics, doxycycline and ciprofloxacin.    No other issues noted by patient at this time except for mild nausea associated with taking oral antibiotics. She does complain of prominence over the medial malleolus in the region of the hardware. She also notes some anesthesia and paresthesia on the dorsum of the lateral aspect of her left foot.      Allergies   · Augmentin    Current Meds    Medication Name Instruction   ALPRAZolam 1 MG Oral Tablet    Cephalexin 500 MG Oral Capsule (Keflex) TAKE 1 CAPSULE 3 TIMES DAILY UNTIL GONE.   Ciprofloxacin HCl - 500 MG Oral Tablet TAKE 1 TABLET TWICE DAILY.   Doxycycline Hyclate 100 MG Oral Tablet TAKE 1 TABLET TWICE DAILY.   HYDROcodone-Acetaminophen 7.5-325 MG Oral Tablet (Norco) TAKE 1 TABLET EVERY 6 HOURS AS NEEDED FOR PAIN.   Propranolol HCl - 20 MG Oral Tablet TAKE 3 TABLET DAILY   PROzac 20 MG Oral Capsule (FLUoxetine HCl) TAKE 3 CAPSULE DAILY   QUEtiapine Fumarate 300 MG Oral Tablet    SEROquel  MG Oral Tablet Extended Release 24 Hour (QUEtiapine Fumarate ER) TAKE 1 TABLET DAILY.   Xanax 0.5 MG Oral Tablet (ALPRAZolam) TAKE ONE TABLET BY MOUTH AT BEDTIME   Zantac TABS (RaNITidine HCl) TAKE 1 TABLET BY MOUTH AT BEDTIME FOR STOMACH     Active Problems   · Ankle instability, left (M25.372)   · Anxiety (F41.9)   · Displaced trimalleolar fracture of left lower leg, subsequent encounter for closed fracture  with routine healing (K75.977T)   ·  Hypertension (I10)   · Infection following a procedure, deep incisional surgical site, initial encounter (T81.42XA)   · Vertigo (R42)    Social History   · Never a smoker    Results/Data    X-rays taken 9/28/2020 of the left ankle are personally reviewed today from [facility].  Indication: Status post left ankle surgery    Study: Left ankle radiograph    Results: 3 views of the left ankle are taken including AP, mortise, lateral view. These demonstrate intact medial and lateral ankle hardware. The fractures appear healed at the distal medial tibia and at the distal fibula. The joint appears concentric. No evidence of hardware failure or migration. No other issues noted in these images. No new fractures or dislocations.     Vitals   Recorded: [id number] 11:56AM   Height 5 ft 6 in   Weight 180 lb    BMI Calculated 29.05   BSA Calculated 1.91   Systolic 145, LUE, Sitting   Diastolic 85, LUE, Sitting   Temperature 98 F   Heart Rate 53   Respiration 18   O2 Saturation 98   Pain Scale 5     Physical Exam  Vital signs are noted in the chart above.     The patient appears generally well.    The patient is oriented x 3.    Mood and affect are appropriate and normal.     Gait is antalgic aided by a left lower extremity cam walker boot..    The left lower extremity is examined out of the boot. The medial and lateral skin incisions are completely healed with no areas of wound breakdown. No fluctuance or other sign of infection noted. Patient has dorsiflexion of the ankle approximately 10 degrees and plantar flexion approximately 35 degrees. She has painless range of motion of the ankle. She has decreased sensation on the dorsal lateral aspect of her foot. Remainder the sensation is normal throughout the lower extremity. She has normal motor function of all digits of her toes. Remainder of gross motor and sensory function otherwise normal throughout this extremity. Distal digits warm and well-perfused.      Assessment   1. Pain  due to internal orthopedic prosthetic device, initial encounter (T84.84XA)   · Patient is now 10 weeks status post her irrigation debridement and IV antibiotic      treatment of her left ankle open reduction internal fixation which was complicated by      postoperative infection. She experiences pain from prominent medial ankle hardware,      which she can palpate through the skin. X-rays demonstrated a healed fracture      today. At this time it is safe to remove the orthopedic implants. Removal      of hardware will be followed by a final 6-week course of IV antibiotics.   2. Infection following a procedure, deep incisional surgical site, initial encounter (T81.42XA)   3. Displaced trimalleolar fracture of left lower leg, subsequent encounter for closed fracture   with routine healing (S82.852D)       Plan  1. Plan for removal of medial and lateral ankle hardware on 10/6/2020  2. Continue oral antibiotics until surgery  3. Plan for IV/PICC line placement during outpatient stay for hardware removal

## 2020-10-06 ENCOUNTER — HOSPITAL ENCOUNTER (OUTPATIENT)
Facility: HOSPITAL | Age: 60
Discharge: HOME | End: 2020-10-07
Attending: ORTHOPAEDIC SURGERY | Admitting: ORTHOPAEDIC SURGERY
Payer: MEDICAID

## 2020-10-06 ENCOUNTER — ANESTHESIA (OUTPATIENT)
Dept: SURGERY | Facility: HOSPITAL | Age: 60
End: 2020-10-06
Payer: MEDICAID

## 2020-10-06 DIAGNOSIS — T84.84XA PAINFUL ORTHOPAEDIC HARDWARE: ICD-10-CM

## 2020-10-06 DIAGNOSIS — Z98.890 S/P HARDWARE REMOVAL: ICD-10-CM

## 2020-10-06 DIAGNOSIS — T81.42XA DEEP POSTOPERATIVE WOUND INFECTION: Primary | ICD-10-CM

## 2020-10-06 DIAGNOSIS — Z01.818 PREOP TESTING: ICD-10-CM

## 2020-10-06 PROBLEM — Z96.9 RETAINED ORTHOPEDIC HARDWARE: Status: ACTIVE | Noted: 2020-10-06

## 2020-10-06 LAB
GRAM STN SPEC: NORMAL

## 2020-10-06 PROCEDURE — 64447 NJX AA&/STRD FEMORAL NRV IMG: CPT | Performed by: STUDENT IN AN ORGANIZED HEALTH CARE EDUCATION/TRAINING PROGRAM

## 2020-10-06 PROCEDURE — 63600175 PHARM REV CODE 636 W HCPCS: Performed by: STUDENT IN AN ORGANIZED HEALTH CARE EDUCATION/TRAINING PROGRAM

## 2020-10-06 PROCEDURE — 71000033 HC RECOVERY, INTIAL HOUR: Performed by: ORTHOPAEDIC SURGERY

## 2020-10-06 PROCEDURE — 87075 CULTR BACTERIA EXCEPT BLOOD: CPT | Mod: 59

## 2020-10-06 PROCEDURE — 94761 N-INVAS EAR/PLS OXIMETRY MLT: CPT | Mod: 59

## 2020-10-06 PROCEDURE — 25000003 PHARM REV CODE 250: Performed by: ORTHOPAEDIC SURGERY

## 2020-10-06 PROCEDURE — 37000009 HC ANESTHESIA EA ADD 15 MINS: Performed by: ORTHOPAEDIC SURGERY

## 2020-10-06 PROCEDURE — 01480 ANES OPEN PX LOWER L/A/F NOS: CPT | Performed by: ORTHOPAEDIC SURGERY

## 2020-10-06 PROCEDURE — 87116 MYCOBACTERIA CULTURE: CPT | Mod: 59

## 2020-10-06 PROCEDURE — 99900035 HC TECH TIME PER 15 MIN (STAT)

## 2020-10-06 PROCEDURE — 36000706: Performed by: ORTHOPAEDIC SURGERY

## 2020-10-06 PROCEDURE — 37000008 HC ANESTHESIA 1ST 15 MINUTES: Performed by: ORTHOPAEDIC SURGERY

## 2020-10-06 PROCEDURE — 87206 SMEAR FLUORESCENT/ACID STAI: CPT

## 2020-10-06 PROCEDURE — 25000003 PHARM REV CODE 250: Performed by: ANESTHESIOLOGY

## 2020-10-06 PROCEDURE — 63600175 PHARM REV CODE 636 W HCPCS: Performed by: ANESTHESIOLOGY

## 2020-10-06 PROCEDURE — 36569 INSJ PICC 5 YR+ W/O IMAGING: CPT

## 2020-10-06 PROCEDURE — 63600175 PHARM REV CODE 636 W HCPCS: Performed by: NURSE ANESTHETIST, CERTIFIED REGISTERED

## 2020-10-06 PROCEDURE — 36000707: Performed by: ORTHOPAEDIC SURGERY

## 2020-10-06 PROCEDURE — 63600175 PHARM REV CODE 636 W HCPCS: Performed by: ORTHOPAEDIC SURGERY

## 2020-10-06 PROCEDURE — C1751 CATH, INF, PER/CENT/MIDLINE: HCPCS

## 2020-10-06 PROCEDURE — 63600175 PHARM REV CODE 636 W HCPCS: Performed by: NURSE PRACTITIONER

## 2020-10-06 PROCEDURE — 87205 SMEAR GRAM STAIN: CPT | Mod: 59

## 2020-10-06 PROCEDURE — 87102 FUNGUS ISOLATION CULTURE: CPT | Mod: 59

## 2020-10-06 PROCEDURE — 71000039 HC RECOVERY, EACH ADD'L HOUR: Performed by: ORTHOPAEDIC SURGERY

## 2020-10-06 PROCEDURE — 87070 CULTURE OTHR SPECIMN AEROBIC: CPT

## 2020-10-06 PROCEDURE — 63600175 PHARM REV CODE 636 W HCPCS

## 2020-10-06 RX ORDER — CEFAZOLIN SODIUM 1 G/3ML
INJECTION, POWDER, FOR SOLUTION INTRAMUSCULAR; INTRAVENOUS
Status: DISCONTINUED | OUTPATIENT
Start: 2020-10-06 | End: 2020-10-06

## 2020-10-06 RX ORDER — ONDANSETRON 8 MG/1
8 TABLET, ORALLY DISINTEGRATING ORAL EVERY 8 HOURS PRN
Status: DISCONTINUED | OUTPATIENT
Start: 2020-10-06 | End: 2020-10-07 | Stop reason: HOSPADM

## 2020-10-06 RX ORDER — ASPIRIN 81 MG/1
81 TABLET ORAL 2 TIMES DAILY
Status: DISCONTINUED | OUTPATIENT
Start: 2020-10-06 | End: 2020-10-06

## 2020-10-06 RX ORDER — LIDOCAINE HYDROCHLORIDE 10 MG/ML
1 INJECTION INFILTRATION; PERINEURAL ONCE AS NEEDED
Status: ACTIVE | OUTPATIENT
Start: 2020-10-06 | End: 2032-03-03

## 2020-10-06 RX ORDER — MUPIROCIN 20 MG/G
OINTMENT TOPICAL 2 TIMES DAILY
Status: DISCONTINUED | OUTPATIENT
Start: 2020-10-06 | End: 2020-10-07 | Stop reason: HOSPADM

## 2020-10-06 RX ORDER — LIDOCAINE HYDROCHLORIDE 10 MG/ML
1 INJECTION, SOLUTION EPIDURAL; INFILTRATION; INTRACAUDAL; PERINEURAL ONCE
Status: DISCONTINUED | OUTPATIENT
Start: 2020-10-06 | End: 2020-10-06 | Stop reason: HOSPADM

## 2020-10-06 RX ORDER — LIDOCAINE HYDROCHLORIDE AND EPINEPHRINE 10; 10 MG/ML; UG/ML
INJECTION, SOLUTION INFILTRATION; PERINEURAL
Status: DISCONTINUED | OUTPATIENT
Start: 2020-10-06 | End: 2020-10-06 | Stop reason: HOSPADM

## 2020-10-06 RX ORDER — MIDAZOLAM HYDROCHLORIDE 1 MG/ML
INJECTION, SOLUTION INTRAMUSCULAR; INTRAVENOUS
Status: DISCONTINUED | OUTPATIENT
Start: 2020-10-06 | End: 2020-10-06

## 2020-10-06 RX ORDER — ALPRAZOLAM 0.25 MG/1
0.5 TABLET ORAL 3 TIMES DAILY
Status: DISCONTINUED | OUTPATIENT
Start: 2020-10-06 | End: 2020-10-07 | Stop reason: HOSPADM

## 2020-10-06 RX ORDER — PHENYLEPHRINE HYDROCHLORIDE 10 MG/ML
INJECTION INTRAVENOUS
Status: DISCONTINUED | OUTPATIENT
Start: 2020-10-06 | End: 2020-10-06

## 2020-10-06 RX ORDER — PROPOFOL 10 MG/ML
VIAL (ML) INTRAVENOUS
Status: DISCONTINUED | OUTPATIENT
Start: 2020-10-06 | End: 2020-10-06

## 2020-10-06 RX ORDER — PROPOFOL 10 MG/ML
VIAL (ML) INTRAVENOUS CONTINUOUS PRN
Status: DISCONTINUED | OUTPATIENT
Start: 2020-10-06 | End: 2020-10-06

## 2020-10-06 RX ORDER — PROPRANOLOL HYDROCHLORIDE 10 MG/1
20 TABLET ORAL 2 TIMES DAILY
Status: DISCONTINUED | OUTPATIENT
Start: 2020-10-06 | End: 2020-10-07 | Stop reason: HOSPADM

## 2020-10-06 RX ORDER — ROPIVACAINE HYDROCHLORIDE 5 MG/ML
INJECTION, SOLUTION EPIDURAL; INFILTRATION; PERINEURAL
Status: DISCONTINUED | OUTPATIENT
Start: 2020-10-06 | End: 2020-10-06

## 2020-10-06 RX ORDER — SODIUM CHLORIDE 0.9 % (FLUSH) 0.9 %
10 SYRINGE (ML) INJECTION
Status: DISCONTINUED | OUTPATIENT
Start: 2020-10-06 | End: 2020-10-07 | Stop reason: HOSPADM

## 2020-10-06 RX ORDER — ASPIRIN 81 MG/1
81 TABLET ORAL DAILY
Status: DISCONTINUED | OUTPATIENT
Start: 2020-10-07 | End: 2020-10-07 | Stop reason: HOSPADM

## 2020-10-06 RX ORDER — HYDROCODONE BITARTRATE AND ACETAMINOPHEN 5; 325 MG/1; MG/1
1 TABLET ORAL EVERY 4 HOURS PRN
Status: DISCONTINUED | OUTPATIENT
Start: 2020-10-06 | End: 2020-10-07 | Stop reason: HOSPADM

## 2020-10-06 RX ORDER — ONDANSETRON 2 MG/ML
INJECTION INTRAMUSCULAR; INTRAVENOUS
Status: DISCONTINUED | OUTPATIENT
Start: 2020-10-06 | End: 2020-10-06

## 2020-10-06 RX ORDER — LIDOCAINE HYDROCHLORIDE 20 MG/ML
INJECTION INTRAVENOUS
Status: DISCONTINUED | OUTPATIENT
Start: 2020-10-06 | End: 2020-10-06

## 2020-10-06 RX ORDER — SODIUM CHLORIDE, SODIUM LACTATE, POTASSIUM CHLORIDE, CALCIUM CHLORIDE 600; 310; 30; 20 MG/100ML; MG/100ML; MG/100ML; MG/100ML
INJECTION, SOLUTION INTRAVENOUS CONTINUOUS
Status: DISCONTINUED | OUTPATIENT
Start: 2020-10-06 | End: 2020-10-07 | Stop reason: HOSPADM

## 2020-10-06 RX ORDER — MIDAZOLAM HYDROCHLORIDE 1 MG/ML
INJECTION INTRAMUSCULAR; INTRAVENOUS
Status: COMPLETED
Start: 2020-10-06 | End: 2020-10-06

## 2020-10-06 RX ORDER — FLUOXETINE HYDROCHLORIDE 20 MG/1
60 CAPSULE ORAL DAILY
Status: DISCONTINUED | OUTPATIENT
Start: 2020-10-06 | End: 2020-10-07 | Stop reason: HOSPADM

## 2020-10-06 RX ADMIN — ASPIRIN 81 MG: 81 TABLET, COATED ORAL at 03:10

## 2020-10-06 RX ADMIN — LIDOCAINE HYDROCHLORIDE 50 MG: 20 INJECTION, SOLUTION INTRAVENOUS at 07:10

## 2020-10-06 RX ADMIN — VANCOMYCIN HYDROCHLORIDE 2000 MG: 100 INJECTION, POWDER, LYOPHILIZED, FOR SOLUTION INTRAVENOUS at 11:10

## 2020-10-06 RX ADMIN — PROPOFOL 50 MG: 10 INJECTION, EMULSION INTRAVENOUS at 07:10

## 2020-10-06 RX ADMIN — SODIUM CHLORIDE, SODIUM LACTATE, POTASSIUM CHLORIDE, AND CALCIUM CHLORIDE: .6; .31; .03; .02 INJECTION, SOLUTION INTRAVENOUS at 08:10

## 2020-10-06 RX ADMIN — PROMETHAZINE HYDROCHLORIDE 6.25 MG: 25 INJECTION INTRAMUSCULAR; INTRAVENOUS at 01:10

## 2020-10-06 RX ADMIN — ONDANSETRON 4 MG: 2 INJECTION, SOLUTION INTRAMUSCULAR; INTRAVENOUS at 08:10

## 2020-10-06 RX ADMIN — PHENYLEPHRINE HYDROCHLORIDE 200 MCG: 10 INJECTION INTRAVENOUS at 07:10

## 2020-10-06 RX ADMIN — CEFTRIAXONE 2 G: 2 INJECTION, SOLUTION INTRAVENOUS at 10:10

## 2020-10-06 RX ADMIN — ROPIVACAINE HYDROCHLORIDE 35 ML: 5 INJECTION, SOLUTION EPIDURAL; INFILTRATION; PERINEURAL at 07:10

## 2020-10-06 RX ADMIN — MUPIROCIN: 20 OINTMENT TOPICAL at 08:10

## 2020-10-06 RX ADMIN — FLUOXETINE 60 MG: 20 CAPSULE ORAL at 03:10

## 2020-10-06 RX ADMIN — PROPRANOLOL HYDROCHLORIDE 20 MG: 10 TABLET ORAL at 08:10

## 2020-10-06 RX ADMIN — ALPRAZOLAM 0.5 MG: 0.25 TABLET ORAL at 08:10

## 2020-10-06 RX ADMIN — MIDAZOLAM 2 MG: 1 INJECTION INTRAMUSCULAR; INTRAVENOUS at 06:10

## 2020-10-06 RX ADMIN — HYDROCODONE BITARTRATE AND ACETAMINOPHEN 1 TABLET: 5; 325 TABLET ORAL at 08:10

## 2020-10-06 RX ADMIN — PROPOFOL 50 MCG/KG/MIN: 10 INJECTION, EMULSION INTRAVENOUS at 07:10

## 2020-10-06 RX ADMIN — CEFAZOLIN 2 G: 330 INJECTION, POWDER, FOR SOLUTION INTRAMUSCULAR; INTRAVENOUS at 08:10

## 2020-10-06 RX ADMIN — SODIUM CHLORIDE, SODIUM LACTATE, POTASSIUM CHLORIDE, AND CALCIUM CHLORIDE: .6; .31; .03; .02 INJECTION, SOLUTION INTRAVENOUS at 07:10

## 2020-10-06 RX ADMIN — ALPRAZOLAM 0.5 MG: 0.25 TABLET ORAL at 03:10

## 2020-10-06 NOTE — ANESTHESIA PROCEDURE NOTES
Peripheral Block    Patient location during procedure: pre-op   Block not for primary anesthetic.  Reason for block: at surgeon's request and post-op pain management   Post-op Pain Location: left ankle  Start time: 10/6/2020 6:58 AM  Timeout: 10/6/2020 6:54 AM   End time: 10/6/2020 7:03 AM    Staffing  Authorizing Provider: Keya Vasquez MD  Performing Provider: Anil Arriaga MD    Preanesthetic Checklist  Completed: patient identified, site marked, surgical consent, pre-op evaluation, timeout performed, IV checked, risks and benefits discussed and monitors and equipment checked  Peripheral Block  Patient position: supine  Prep: ChloraPrep  Patient monitoring: heart rate, cardiac monitor, continuous pulse ox, continuous capnometry and frequent blood pressure checks  Block type: popliteal  Laterality: left  Injection technique: single shot  Needle  Needle type: Stimuplex   Needle gauge: 21 G  Needle length: 4 in  Needle localization: anatomical landmarks and ultrasound guidance   -ultrasound image captured on disc.  Assessment  Injection assessment: negative aspiration, negative parasthesia and local visualized surrounding nerve  Paresthesia pain: none  Heart rate change: no  Slow fractionated injection: yes  Additional Notes  VSS.  [facility] RN monitoring vitals throughout procedure.  Patient tolerated procedure well.

## 2020-10-06 NOTE — OP NOTE
Operative Report    LUBA JAVIER  : 1960  MRN: [id number]    Date of Procedure: 10/6/2020     Pre-op Diagnosis:    Symptomatic left orthopedic ankle hardware  Prior left ankle infection status post ORIF    Post-op Diagnosis:    Same    Procedure:   () Removal of hardware left medial and lateral ankle hardware      Surgeon: Triston Naidu IV, MD     Assisting Surgeon:    none     Anesthesiologist:  See record    Anesthesia:    regional and MAC    Estimated Blood Loss: minimal         Specimens: none    Indications for surgery:   Luba Javier is a 60 y.o. female patient who presented for removal of ankle hardware.  Initial surgery was 20 which was complicated by postoperative infection, treated with irrigation, debridement, IV antibiotics 20.  The patient was kept on IV antibiotics for approximately 6 weeks then transitioned to oral antibiotics.  She had clinical evidence of infection clearance and xrays showed evidence of healed fracture, thus for final clearance of infection we discussed removal of ankle hardware and a final course of IV antibiotics.  We discussed the risks and benefits of proceeding with removal of hardware, including loss of reduction, persistent pain, nerve and vessel injury, bleeding, subsequent infection, and need for further surgery.  We also discussed the risks with undergoing another antibiotic course.  The patient agreed to proceed with the plan and thus presented for surgery.      Description of procedure:   Prior to the surgical procedure, the patient was identified in the preoperative holding area.  We reviewed our prior discussion about the risks and benefits of surgery including the expected post operative protocol.  The patient signed an informed consent and the operative extremity was marked.  Preoperative antibiotics were held prior to incision to obtain cultures after hardware removal; antibiotics were subsequently given after hardware removal.  The  patient was taken to the operating room and positioned on the table in the supine position.  MAC anesthesia used.  A well padded thigh tourniquet was placed.  The patient was subsequently prepped and draped in the usual orthopaedic sterile fashion.  A surgical timeout was performed confirming the surgical site and procedure prior to proceeding.         Attention was first turned to the lateral ankle.  The lateral incision was made directly over the distal lateral fibula at the same location as prior incision.  Care was taken to avoid the superficial peroneal nerve, which was not visualized during the approach.  There was no evidence of active infection.  The plate was well fixed and the screws had sufficient purchase in the plate.  All screws and the plate were removed.  Any fibrinous tissue was debrided.  The fracture was healed and not visible.  Two sets of deep cultures were taken at the lateral ankle.      Attention was then turned to the medial ankle.  The incision was made directly over the prior medial ankle incision for a standard approach to the medial tibia.  Care was taken to avoid the saphenous nerve and vein, which were not observed during the approach.  There was a small amount of murky fluid in the deep tissues but no gross purulence and otherwise healthy appearing tissue.  The plate was encountered and well fixed to the bone.  All screws in the plate were well fixed.  The screws and plate were removed without difficulty.  The fracture was not visible at the medial tibia.  Fibrinous tissue was debrided.  Two sets of deep cultures were taken at the medial ankle.      Intraoperative radiographs were taken demonstrating complete hardware removal and no fracture or loss of reduction.  Stress views demonstrated no ankle mortise diastasis.      Finally, 3 liters of fluid via pulse lavage was used to irrigate the medial and another 3 liters to irrigate the lateral wound and soft tissue.  The wounds were then  closed with a combination #1 prolene vertical mattress and 2.0 prolene simple sutures.  The wounds were covered with a dry sterile dressing and an AO splint was placed.  The patient was subsequently taken to the PACU in good condition.  All counts were correct at the end of the case.       Post-Operative Management:  The patient will be NWB on the operative extremity.  After discharge, the patient will follow up in my office ([phone]) in 14 days after surgery.  She will receive a PICC line and ID consultation for coordination of IV antibiotics prior to discharge home.       Complications: No     Condition: Good     Disposition: PACU - hemodynamically stable.     Attestation: I was present and scrubbed for the entire procedure.    Implants:   Implant Name Type Inv. Item Serial No.  Lot No. LRB No. Used Action   6 HOLE Y FX PLATE     [email or web address] PREETHI.  Left 1 Explanted   PLATE BONE FIB DIS LAT VARIAX - [id number]  PLATE BONE FIB DIS LAT VARIAX  [employer] PREETHI.  Left 1 Explanted   3.5 x 10 BONES SCREW     [employer] PREETHI.  Left 2 Explanted   LOCK SCREW 3.5 X 36       Left 1 Explanted   SCREW BONE LOCK T10 3.5X10MM - [id number]  SCREW BONE LOCK T10 3.5X10MM  [employer] PREETHI.  Left 2 Explanted   SCREW BONE LOCK T10 3.5X12MM - [id number]  SCREW BONE LOCK T10 3.5X12MM  [employer] PREETHI.  Left 1 Explanted   SCREW BONE LOCK T10 3.5X14MM - [id number]  SCREW BONE LOCK T10 3.5X14MM  [employer] PREETHI.  Left 2 Explanted   SCREW BONE NON LOCK 3.5X12MM - [id number]  SCREW BONE NON LOCK 3.5X12MM  [employer] PREETHI.  Left 4 Explanted   SCREW BONE NON LOCK 3.5X14MM - [id number]  SCREW BONE NON LOCK 3.5X14MM  [employer] PREETHI.  Left 1 Explanted   LOCK SCREW 3.5  X38      Left 1 Explanted   SCREW BONE NON LOCK 3.5X34MM - [id number]  SCREW BONE NON LOCK 3.5X34MM  [employer] PREETHI.  Left 1 Explanted   SCREW BONE NON LOCK 3.5X28MM - [id number]  SCREW BONE NON LOCK 3.5X28MM  [employer] PREETHI.  Left 2  Explanted   K-WIRE TROCAR POINT 1.2B192DH - [id number]  K-WIRE TROCAR POINT 1.8N938WM  [employer] PREETHI.  Left 3 Explanted

## 2020-10-06 NOTE — PLAN OF CARE
Problem: Adult Inpatient Plan of Care  Goal: Plan of Care Review  Outcome: Ongoing, Progressing     Pt arrived to unit. Introduced self as VN for this shift. Admission questions completed by VN. Educated pt on VTE risk, safety precautions, and VN's role in pt care. Opportunity given for pt's questions. All questions answered.

## 2020-10-06 NOTE — PROGRESS NOTES
Patient Arrived to unit. VSS. AAOx4, patient voiding in bedpan. States she can not feel her L leg. L leg elevated, dressing clean, dry, and intact. Call bell in reach. Safety maintained.

## 2020-10-06 NOTE — INTERVAL H&P NOTE
The patient has been examined and the H&P has been reviewed:    I concur with the findings and no changes have occurred since H&P was written.    Surgery risks, benefits and alternative options discussed and understood by patient/family.    Plan for hospital extended recovery for ID consult and  consult for IV antibiotics outpatient.            There are no hospital problems to display for this patient.

## 2020-10-06 NOTE — ANESTHESIA PROCEDURE NOTES
Peripheral Block    Patient location during procedure: pre-op   Block not for primary anesthetic.  Reason for block: at surgeon's request and post-op pain management   Post-op Pain Location: left ankle  Start time: 10/6/2020 6:58 AM  Timeout: 10/6/2020 6:54 AM   End time: 10/6/2020 7:03 AM    Staffing  Authorizing Provider: Keya Vasquez MD  Performing Provider: Anil Arriaga MD    Preanesthetic Checklist  Completed: patient identified, site marked, surgical consent, pre-op evaluation, timeout performed, IV checked, risks and benefits discussed and monitors and equipment checked  Peripheral Block  Patient position: supine  Prep: ChloraPrep  Patient monitoring: heart rate, cardiac monitor, continuous pulse ox, continuous capnometry and frequent blood pressure checks  Block type: adductor canal  Laterality: left  Injection technique: single shot  Needle  Needle type: Stimuplex   Needle gauge: 21 G  Needle length: 4 in  Needle localization: anatomical landmarks and ultrasound guidance   -ultrasound image captured on disc.  Assessment  Injection assessment: negative aspiration, negative parasthesia and local visualized surrounding nerve  Paresthesia pain: none  Heart rate change: no  Slow fractionated injection: yes  Additional Notes  VSS.  [facility] RN monitoring vitals throughout procedure.  Patient tolerated procedure well.

## 2020-10-06 NOTE — CONSULTS
[facility] Consult Note    Primary Attending Physician: Triston Naidu MD  Consultant Attending: Odilon Shelton MD  Consultant Fellow: Josh Snyder MD    Reason for Consult:     Prior left ankle infection status post ORIF, now s/p hardware removal      Assessment:     Taty Kenney is a 60 y.o. female with:  Patient Active Problem List    Diagnosis Date Noted    S/P hardware removal 10/06/2020    Painful orthopaedic hardware 10/06/2020    Retained orthopedic hardware 10/06/2020    MRSA (methicillin resistant Staphylococcus aureus) infection     Infection due to Enterobacter cloacae     Closed trimalleolar fracture of left ankle 07/27/2020    Deep postoperative wound infection 07/24/2020        Plan:     - Recommend 6 weeks of vancomycin and ceftriaxone 2 g q24h for empiric coverage of deep joint infection per discussion with Orthopedics and ID attending physicians. Antibiotics ordered.     Thank you for allowing us to participate in the care of this patient. Please contact me if you have any questions regarding this consult. ID will continue to follow.     Josh Snyder MD  [facility], PGY-4  Cell: [phone]    Subjective:      History of Present Illness:  Taty Kenney is a 60 y.o. female with PMHx significant for trimalleolar fracture of the left ankle s/p ORIF on 7/7, c/b deep infection of the wound requiring irrigation (hardware retained) on 7/24. Operative cultures grew MRSA and Enterobacter. Patient completed 6 weeks of IV vanc and cefepime (c/b RONEL 2/2 vanc and patient missing lab draws) followed by suppressive antibiotic therapy with doxycycline and ciprofloxacin.     On follow up with Orthopedics, patient's XR showed healed fracture. After discussion with the patient, decision was made to proceed with removal of hardware and repeating course of antibiotics to aid in complete clearance of the infection. Patient admitted for this procedure on  "10/6. Operative note commented on a small amount of "murky" appearing fluid in the deep tissues, but no davian pus or findings concerning for acute infection.       Past Medical History:  Past Medical History:   Diagnosis Date    Anxiety     Depression     Hypertension        Past Surgical History:  Past Surgical History:   Procedure Laterality Date    HYSTERECTOMY      IRRIGATION AND DEBRIDEMENT OF LOWER EXTREMITY Left 7/24/2020    Procedure: LEFT ANKLE INFECTION IRRIGATION AND DEBRIDEMENT, possible External fixation;  Surgeon: Triston Naidu IV, MD;  Location: [facility] OR;  Service: Orthopedics;  Laterality: Left;  correct card and give to Shari  Pulse Lavage and large bone or ortho minor tray- to start case  Zac- Styker to bring ankle set and ex fix - needed in case of removal of harware-Confrimed zac- AM 07/23    OPEN REDUCTION AND INTERNAL FIXATION (ORIF) OF INJURY OF ANKLE Left 7/7/2020    Procedure: ORIF, ANKLE;  Surgeon: Triston Naidu IV, MD;  Location: [facility] OR;  Service: Orthopedics;  Laterality: Left;  GENERAL + REGIONAL  ORTHO MAJOR TRAY  BONE FOAM RAMP  FLUOROSCOPY  Hair Lopez confirmed CW 7/6       Allergies:  Review of patient's allergies indicates:   Allergen Reactions    Augmentin [amoxicillin-pot clavulanate] Other (See Comments)     Stomach cramps       Medications:   In-Hospital Scheduled Medications:   lidocaine (PF) 10 mg/ml (1%)  1 mL Intradermal Once      In-Hospital PRN Medications:  lidocaine HCL 10 mg/ml (1%)   In-Hospital IV Infusion Medications:   lactated ringers        Home Medications:  Prior to Admission medications    Medication Sig Start Date End Date Taking? Authorizing Provider   alprazolam (XANAX) 0.5 MG tablet Take 0.5 mg by mouth 3 (three) times daily.   Yes Historical Provider   FLUoxetine 20 MG capsule Take 60 mg by mouth once daily.   Yes Historical Provider   ondansetron (ZOFRAN-ODT) 4 MG TbDL Take 2 tablets (8 mg total) by mouth every 8 (eight) hours as " "needed (nausea). 20  Yes Anat Smalls MD   propranoloL (INDERAL) 40 MG tablet Take 20 mg by mouth 2 (two) times daily.    Yes Historical Provider   QUEtiapine (SEROQUEL XR) 300 MG Tb24 Take 400 mg by mouth every evening.    Yes Historical Provider   aspirin (ECOTRIN) 81 MG EC tablet Take 1 tablet (81 mg total) by mouth 2 (two) times a day. 20  Maria Elena Johnson DO       Family History:  Family History   Problem Relation Age of Onset    Hypertension Mother     Hyperlipidemia Mother     Alcohol abuse Mother     Cancer Father        Social History:  Social History     Tobacco Use    Smoking status: Former Smoker   Substance Use Topics    Alcohol use: No    Drug use: No       ROS  Deferred as patient in OR/post op the majority of the day     Objective:   Last 24 Hour Vital Signs:  BP  Min: 111/58  Max: 172/87  Temp  Av.5 °F (36.4 °C)  Min: 97 °F (36.1 °C)  Max: 97.9 °F (36.6 °C)  Pulse  Av.8  Min: 50  Max: 69  Resp  Av.8  Min: 10  Max: 19  SpO2  Av.2 %  Min: 95 %  Max: 100 %  Height  Av' 6" (167.6 cm)  Min: 5' 6" (167.6 cm)  Max: 5' 6" (167.6 cm)  Weight  Av.2 kg (168 lb)  Min: 76.2 kg (168 lb)  Max: 76.2 kg (168 lb)  No intake/output data recorded.    Physical Exam   Deferred as patient in OR/post op the majority of the day    Laboratory Results:  Most Recent Data:  CBC:   Lab Results   Component Value Date    WBC 6.92 10/02/2020    HGB 10.6 (L) 10/02/2020    HCT 32.2 (L) 10/02/2020     10/02/2020    MCV 87 10/02/2020    RDW 14.3 10/02/2020     BMP:   Lab Results   Component Value Date     (L) 10/02/2020    K 4.3 10/02/2020    CL 99 10/02/2020    CO2 23 10/02/2020    BUN 25 (H) 10/02/2020    GLU 97 10/02/2020    CALCIUM 9.4 10/02/2020     LFTs:   Lab Results   Component Value Date    PROT 7.6 2020    ALBUMIN 3.7 2020    BILITOT 0.2 2020    AST 21 2020    ALKPHOS 72 2020    ALT 16 2020     Coags:   Lab Results "   Component Value Date    INR 1.0 07/06/2020     FLP: No results found for: CHOL, HDL, LDLCALC, TRIG, CHOLHDL  DM:   Lab Results   Component Value Date    CREATININE 1.5 (H) 10/02/2020     Thyroid:   Lab Results   Component Value Date    TSH 0.619 08/31/2018     Anemia: No results found for: IRON, TIBC, FERRITIN, DTHBRYHJ05, FOLATE  Cardiac:   Lab Results   Component Value Date    TROPONINI 0.007 08/31/2018     Urinalysis:   Lab Results   Component Value Date    COLORU Yellow 10/02/2020    SPECGRAV 1.025 10/02/2020    NITRITE Negative 10/02/2020    KETONESU Negative 10/02/2020    UROBILINOGEN Negative 10/02/2020       Trended Lab Data:  Recent Labs   Lab 10/02/20  1500   WBC 6.92   HGB 10.6*   HCT 32.2*      MCV 87   RDW 14.3   *   K 4.3   CL 99   CO2 23   BUN 25*   GLU 97         Microbiology Data:  Operative cultures 7/24 MRSA, Enterobacter  Operative cultures 10/6 pending    Antimicrobials:  Vanc/ cefepime for 6 weeks ending 9/4  Suppressive doxy and cipro (2/2 retained hardware) until 10/6  Vanc 10/6 - present  Cefepime 10/6 - present    Other Results:    Radiology:  X-ray Ankle Complete 3 View Left    Result Date: 9/28/2020  EXAMINATION: XR ANKLE COMPLETE 3 VIEW LEFT CLINICAL HISTORY: Displaced trimalleolar fracture of left lower leg, subsequent encounter for closed fracture with routine healing TECHNIQUE: AP, lateral and oblique views of the left ankle were performed. COMPARISON: 07/15/2020 FINDINGS: Prior ORIF of distal fibular and tibial fractures.  Hardware appearance and alignment is similar without evidence for loosening failure.  Mineralization change at the level of the lower syndesmosis.  Ankle mortise appears symmetric.  Talar dome is intact.  Soft tissue prominence about the ankle.     As above. Electronically signed by: Vincent Piña Date:    09/28/2020 Time:    12:19    Surg Fl Surgery Fluoro Usage    Result Date: 10/6/2020  See OP Notes for results. IMPRESSION: See OP Notes for  results. This procedure was auto-finalized by: Virtual Radiologist

## 2020-10-06 NOTE — TRANSFER OF CARE
"Anesthesia Transfer of Care Note    Patient: Taty Kenney    Procedure(s) Performed: Procedure(s) (LRB):  REMOVAL, HARDWARE, ANKLE, medial and lateral ankle plates (Left)    Patient location: PACU    Anesthesia Type: MAC    Transport from OR: Transported from OR on room air with adequate spontaneous ventilation    Post pain: adequate analgesia    Post assessment: no apparent anesthetic complications and tolerated procedure well    Post vital signs: stable    Level of consciousness: awake, alert and oriented    Nausea/Vomiting: no nausea/vomiting    Complications: none    Transfer of care protocol was followed      Last vitals:   Visit Vitals  /65 (BP Location: Right arm, Patient Position: Lying)   Pulse 68   Temp 36.1 °C (97 °F) (Skin)   Resp 17   Ht 5' 6" (1.676 m)   Wt 76.2 kg (168 lb)   SpO2 99%   Breastfeeding No   BMI 27.12 kg/m²     "

## 2020-10-06 NOTE — ANESTHESIA POSTPROCEDURE EVALUATION
Anesthesia Post Evaluation    Patient: Taty Kenney    Procedure(s) Performed: Procedure(s) (LRB):  REMOVAL, HARDWARE, ANKLE, medial and lateral ankle plates (Left)    Final Anesthesia Type: general    Patient location during evaluation: PACU  Patient participation: Yes- Able to Participate  Level of consciousness: awake and alert, oriented and awake  Post-procedure vital signs: reviewed and stable  Pain management: adequate  Airway patency: patent    PONV status at discharge: No PONV  Anesthetic complications: no      Cardiovascular status: blood pressure returned to baseline  Respiratory status: unassisted and room air  Hydration status: euvolemic  Follow-up not needed.          Vitals Value Taken Time   /56 10/06/20 1318   Temp 36.6 °C (97.9 °F) 10/06/20 0855   Pulse 74 10/06/20 1320   Resp 14 10/06/20 1320   SpO2 95 % 10/06/20 1320   Vitals shown include unvalidated device data.      No case tracking events are documented in the log.      Pain/Pranav Score: Pranav Score: 10 (10/6/2020  9:00 AM)

## 2020-10-07 VITALS
BODY MASS INDEX: 27 KG/M2 | HEIGHT: 66 IN | SYSTOLIC BLOOD PRESSURE: 127 MMHG | OXYGEN SATURATION: 96 % | WEIGHT: 168 LBS | RESPIRATION RATE: 18 BRPM | HEART RATE: 61 BPM | TEMPERATURE: 97 F | DIASTOLIC BLOOD PRESSURE: 61 MMHG

## 2020-10-07 PROCEDURE — 25000003 PHARM REV CODE 250: Performed by: ORTHOPAEDIC SURGERY

## 2020-10-07 PROCEDURE — 94761 N-INVAS EAR/PLS OXIMETRY MLT: CPT

## 2020-10-07 RX ORDER — ASPIRIN 81 MG/1
81 TABLET ORAL DAILY
Qty: 30 TABLET | Refills: 0 | Status: SHIPPED | OUTPATIENT
Start: 2020-10-07 | End: 2020-11-06

## 2020-10-07 RX ORDER — HYDROCODONE BITARTRATE AND ACETAMINOPHEN 5; 325 MG/1; MG/1
1 TABLET ORAL EVERY 6 HOURS PRN
Qty: 28 TABLET | Refills: 0 | Status: SHIPPED | OUTPATIENT
Start: 2020-10-07 | End: 2020-10-14

## 2020-10-07 RX ADMIN — ALPRAZOLAM 0.5 MG: 0.25 TABLET ORAL at 02:10

## 2020-10-07 RX ADMIN — ALPRAZOLAM 0.5 MG: 0.25 TABLET ORAL at 08:10

## 2020-10-07 RX ADMIN — FLUOXETINE 60 MG: 20 CAPSULE ORAL at 11:10

## 2020-10-07 RX ADMIN — HYDROCODONE BITARTRATE AND ACETAMINOPHEN 1 TABLET: 5; 325 TABLET ORAL at 02:10

## 2020-10-07 RX ADMIN — ASPIRIN 81 MG: 81 TABLET, COATED ORAL at 08:10

## 2020-10-07 RX ADMIN — HYDROCODONE BITARTRATE AND ACETAMINOPHEN 1 TABLET: 5; 325 TABLET ORAL at 07:10

## 2020-10-07 RX ADMIN — MUPIROCIN: 20 OINTMENT TOPICAL at 08:10

## 2020-10-07 RX ADMIN — PROPRANOLOL HYDROCHLORIDE 20 MG: 10 TABLET ORAL at 08:10

## 2020-10-07 NOTE — PROGRESS NOTES
AVS printed and given to patient. Patient AAOx4, VSS, Patient c/o pain, managed with PRN medications. Dressing to L foot clean,dry, and intact. Preparing for d/c

## 2020-10-07 NOTE — DISCHARGE SUMMARY
[facility]  Orthopedics  Discharge Summary      Patient Name: Taty Kenney  MRN: [id number]  Admission Date: 10/6/2020  Hospital Length of Stay: 1 days  Discharge Date and Time:  10/07/2020 12:59 PM  Attending Physician: Triston Coles IV, MD   Discharging Provider: Carolyn Osorio MD  Primary Care Provider: Meggan Palafox NP    HPI: 60-year-old female with history of L ankle surgery on 7/7/2020 which was complicated by postoperative deep infection which was treated with irrigation and debridement with IV antibiotics on 7/24/2020. She is now 10 weeks status post her irrigation debridement and start of IV antibiotics. At this time wound has improved and completely closed with no evidence of active infection. She completed her course of IV antibiotics and has been on suppressive oral antibiotics, doxycycline and ciprofloxacin.  Now s/p HWR from the left ankle.    Procedure(s) (LRB):  REMOVAL, HARDWARE, ANKLE, medial and lateral ankle plates (Left)      Hospital Course: Admitted post-operatively for ID consult, PICC line placement, and set up of home IV antibiotics after ID recommendations.  Did well overnight, no issues.  Ambulating appropriately maintaining splint and NWB LLE.  Will follow up with ID in 1 week and with Dr. Coles in 2 weeks.    Consults (From admission, onward)        Status Ordering Provider     Inpatient consult to Infectious Diseases  Once     Provider:  (Not yet assigned)    Completed TRISTON COLES IV     Inpatient consult to Infectious Diseases  Once     Provider:  (Not yet assigned)    Completed CAROLYN OSORIO     [facility]  [facility]     Provider:  (Not yet assigned)    Acknowledged TRISTON COLES IV     Inpatient consult to Social Work  Once     Provider:  (Not yet assigned)    Acknowledged TRISTON COLES IV     Inpatient consult to Social Work  Once     Provider:  (Not yet assigned)    [facility]YN OSORIO  [facility]     Pharmacy to dose Vancomycin consult  Once     Provider:  (Not yet assigned)    Acknowledged ARISTIDES ROUSE          Significant Diagnostic Studies: Microbiology: Wound Culture: pending    Pending Diagnostic Studies:     None        Final Active Diagnoses:    Diagnosis Date Noted POA    PRINCIPAL PROBLEM:  Painful orthopaedic hardware [T84.84XA] 10/06/2020 Yes    Retained orthopedic hardware [Z96.9] 10/06/2020 Not Applicable    Closed trimalleolar fracture of left ankle [S82.852A] 07/27/2020 Yes      Problems Resolved During this Admission:      Discharged Condition: good    Disposition: Home or Self Care    Follow Up:  Follow-up Information     Triston Naidu IV, MD On 10/19/2020.    Specialty: Orthopedic Surgery  Why: 9:20 am   - previously booked      Contact information:  [address]  [address]  [facility] [address]  [phone]             [facility].    Specialties: Pharmacist, DME Provider, IV Infusion  Contact information:  [address]  [place] [address]  [phone]             Triston Naidu IV, MD In 2 weeks.    Specialty: Orthopedic Surgery  Contact information:  [address]  [address]  [facility] [address]  [phone]             Aristides Rouse MD In 1 week.    Specialty: Infectious Diseases  Contact information:  [address] [facility] [address]  [phone]                 Patient Instructions:      COVID-19 Routine Screening   Standing Status: Future Number of Occurrences: 1 Standing Exp. Date: 11/30/21     Order Specific Question Answer Comments   Is the patient symptomatic? No    Is this needed for pre-procedure or pre-op testing? Yes    Diagnosis: Preop testing [108919]      Ambulatory referral/consult to Home Health   Standing Status: Future   Referral Priority: Routine Referral Type: Home Health   Referral Reason: Specialty Services Required   Requested Specialty: Home Health Services   Number of Visits Requested: 1     Weight bearing  restrictions (specify):   Order Comments: IRVING RUSH     Medications:  Reconciled Home Medications:      Medication List      START taking these medications    HYDROcodone-acetaminophen 5-325 mg per tablet  Commonly known as: NORCO  Take 1 tablet by mouth every 6 (six) hours as needed for Pain (Take as needed, no more than every 6 hours).        CHANGE how you take these medications    aspirin 81 MG EC tablet  Commonly known as: ECOTRIN  Take 1 tablet (81 mg total) by mouth once daily.  What changed: when to take this        CONTINUE taking these medications    ALPRAZolam 0.5 MG tablet  Commonly known as: XANAX  Take 0.5 mg by mouth 3 (three) times daily.     FLUoxetine 20 MG capsule  Take 60 mg by mouth once daily.     ondansetron 4 MG Tbdl  Commonly known as: ZOFRAN-ODT  Take 2 tablets (8 mg total) by mouth every 8 (eight) hours as needed (nausea).     propranoloL 40 MG tablet  Commonly known as: INDERAL  Take 20 mg by mouth 2 (two) times daily.     QUEtiapine 300 MG Tb24  Commonly known as: SEROQUEL XR  Take 400 mg by mouth every evening.          IV antibiotics:  Recommend 6 weeks of vancomycin and ceftriaxone 2 g q24h, tentative end date 11/17/2020    Carolyn Chi MD  Orthopedics  [facility]

## 2020-10-07 NOTE — PROGRESS NOTES
"\[facility]\"" Infectious Diseases Progress Note    Assessment/Plan:     Taty Kenney is a 60 y.o. female with PMHx significant for trimalleolar fracture of the left ankle s/p ORIF on , c/b deep infection of the wound requiring irrigation (hardware retained) on . Operative cultures grew MRSA and Enterobacter. Patient completed 6 weeks of IV vanc and cefepime (c/b RONEL 2/2 vanc and patient missing lab draws) followed by suppressive antibiotic therapy with doxycycline and ciprofloxacin.      On follow up with Orthopedics, patient's XR showed healed fracture. After discussion with the patient, decision was made to proceed with removal of hardware and repeating course of antibiotics to aid in complete clearance of the infection. Patient admitted for this procedure on 10/6. Operative note commented on a small amount of "murky" appearing fluid in the deep tissues, but no davian pus or other concerning findings.     Recommendations:  - Recommend 6 week course of vancomycin and ceftriaxone 2 g q24h, last day of therapy . Need weekly labs CBC, BMP, ESR and CRP. Labs to be faxed to ID office, attention to Dr Bynum, [phone]. Please schedule [facility] ID clinic follow up 1 -2 weeks after discharge. If unable to schedule [facility] - please let \[facility]\"" ID service know.       Josh Snyder MD  \[facility]\"" Infectious Diseases, PGY-4  Cell: [phone]    Thank you for allowing us to participate in the care of this patient. We will continue to follow along. Case has been discussed with consult staff, who is in agreement with assessment and plan. ID will sign off.     Subjective:      No acute events overnight.      Objective:   Last 24 Hour Vital Signs:  BP  Min: 107/57  Max: 145/77  Temp  Av.3 °F (36.8 °C)  Min: 97.9 °F (36.6 °C)  Max: 98.5 °F (36.9 °C)  Pulse  Av.4  Min: 60  Max: 90  Resp  Av.4  Min: 12  Max: 24  SpO2  Av.9 %  Min: 93 %  Max: 99 %  I/O last 3 completed shifts:  In: 1000 " [I.V.:1000]  Out: 1645 [Urine:1645]    Physical Exam  Constitutional:       General: She is not in acute distress.     Appearance: Normal appearance. She is not ill-appearing or diaphoretic.   HENT:      Head: Normocephalic and atraumatic.      Nose: Nose normal.      Mouth/Throat:      Mouth: Mucous membranes are moist.      Pharynx: Oropharynx is clear. No oropharyngeal exudate or posterior oropharyngeal erythema.   Eyes:      General: No scleral icterus.     Conjunctiva/sclera: Conjunctivae normal.   Neck:      Musculoskeletal: Normal range of motion and neck supple. No neck rigidity.   Cardiovascular:      Rate and Rhythm: Normal rate and regular rhythm.      Heart sounds: Normal heart sounds.   Pulmonary:      Effort: Pulmonary effort is normal.      Breath sounds: Normal breath sounds.   Abdominal:      General: There is no distension.      Palpations: Abdomen is soft.      Tenderness: There is no abdominal tenderness.   Musculoskeletal:      Comments: Ankle in clean, dry post-surgical dressing   Skin:     Coloration: Skin is not jaundiced.      Findings: No lesion or rash.   Neurological:      General: No focal deficit present.      Mental Status: She is alert and oriented to person, place, and time.   Psychiatric:         Mood and Affect: Mood normal.         Behavior: Behavior normal.         Laboratory:  Laboratory Data Reviewed: yes  Pertinent Findings:  Recent Labs   Lab 10/02/20  1500   WBC 6.92   HGB 10.6*   HCT 32.2*      MCV 87   RDW 14.3   *   K 4.3   CL 99   CO2 23   BUN 25*   CREATININE 1.5*   GLU 97         Microbiology Data:  Operative cultures 7/24 MRSA, Enterobacter  Operative cultures 10/6 pending    Antimicrobials:  Vanc/ cefepime for 6 weeks ending 9/4  Suppressive doxy and cipro (2/2 retained hardware) until 10/6  Vanc 10/6 - present  Ceftriaxone 10/6 - present    Other Results:  Radiology Results:  X-ray Chest 1 View    Result Date: 10/6/2020  EXAMINATION: XR CHEST 1 VIEW  CLINICAL HISTORY: Picc placement. TECHNIQUE: Single frontal view of the chest was performed. COMPARISON: Multiple prior radiographs of the chest, most recent from 07/24/2020. FINDINGS: There is a right sided PICC which terminates in the mid to low SVC.  The lungs are well expanded and clear. No focal opacities are seen. The pleural spaces are clear.  The cardiac silhouette is unremarkable.  The visualized osseous structures demonstrate degenerative changes.  There are remote right-sided rib fractures.     PICC as above. Electronically signed by: Joselo Castillo Date:    10/06/2020 Time:    21:03    X-ray Ankle Complete 3 View Left    Result Date: 9/28/2020  EXAMINATION: XR ANKLE COMPLETE 3 VIEW LEFT CLINICAL HISTORY: Displaced trimalleolar fracture of left lower leg, subsequent encounter for closed fracture with routine healing TECHNIQUE: AP, lateral and oblique views of the left ankle were performed. COMPARISON: 07/15/2020 FINDINGS: Prior ORIF of distal fibular and tibial fractures.  Hardware appearance and alignment is similar without evidence for loosening failure.  Mineralization change at the level of the lower syndesmosis.  Ankle mortise appears symmetric.  Talar dome is intact.  Soft tissue prominence about the ankle.     As above. Electronically signed by: Vincent Piña Date:    09/28/2020 Time:    12:19    Surg Fl Surgery Fluoro Usage    Result Date: 10/6/2020  See OP Notes for results. IMPRESSION: See OP Notes for results. This procedure was auto-finalized by: Virtual Radiologist      Current Medications:     Infusions:   lactated ringers          Scheduled:   ALPRAZolam  0.5 mg Oral TID    aspirin  81 mg Oral Daily    cefTRIAXone (ROCEPHIN) IVPB  2 g Intravenous Q24H    FLUoxetine  60 mg Oral Daily    mupirocin   Nasal BID    propranoloL  20 mg Oral BID    vancomycin (VANCOCIN) IVPB  1,250 mg Intravenous Q24H        PRN:  HYDROcodone-acetaminophen, lidocaine HCL 10 mg/ml (1%), ondansetron, sodium  chloride 0.9%, Pharmacy to dose Vancomycin consult **AND** vancomycin - pharmacy to dose

## 2020-10-07 NOTE — PROCEDURES
"Taty Kenney is a 60 y.o. female patient.    Temp: 98.4 °F (36.9 °C) (10/06/20 1923)  Pulse: 84 (10/06/20 1923)  Resp: 20 (10/06/20 1923)  BP: 119/78 (10/06/20 1923)  SpO2: 97 % (10/06/20 1923)  Weight: 76.2 kg (168 lb) (10/06/20 0618)  Height: 5' 6" (167.6 cm) (10/06/20 0618)    PICC  Date/Time: 10/6/2020 8:36 PM  Performed by: Andrés Whitehead RN  Supervising provider: Joan Chiu RN  Consent Done: Yes  Time out: Immediately prior to procedure a time out was called to verify the correct patient, procedure, equipment, support staff and site/side marked as required  Indications: med administration and vascular access  Anesthesia: local infiltration  Local anesthetic: lidocaine 1% without epinephrine  Anesthetic Total (mL): 3  Preparation: skin prepped with ChloraPrep  Skin prep agent dried: skin prep agent completely dried prior to procedure  Sterile barriers: all five maximum sterile barriers used - cap, mask, sterile gown, sterile gloves, and large sterile sheet  Hand hygiene: hand hygiene performed prior to central venous catheter insertion  Location details: right basilic  Catheter type: double lumen  Catheter size: 5 Fr  Catheter Length: 38cm    Ultrasound guidance: yes  Vessel Caliber: medium and patent, compressibility normal  Vascular Doppler: not done  Needle advanced into vessel with real time Ultrasound guidance.  Guidewire confirmed in vessel.  Sterile sheath used.  no esophageal manometryNumber of attempts: 1  Post-procedure: blood return through all ports, chlorhexidine patch and sterile dressing applied  Estimated blood loss (mL): 0  Specimens: No  Implants: No  Assessment: placement verified by x-ray  Tip Termination Explanation: see rad report  Complications: none  Comments: Do not use until placement verified by MD Andrés Whitehead  10/6/2020    "

## 2020-10-07 NOTE — PLAN OF CARE
Picc line place by SHELIA Whitehead. VN verified Picc line order with bedside RN Tali. Pt tolerated well. Xray called for placement verification.

## 2020-10-07 NOTE — PROGRESS NOTES
this pt has had IV abx in the past with [facility] --- Daniel notified -  he will meet with pt this am

## 2020-10-07 NOTE — PLAN OF CARE
Patient resting in bed, AAOx4. Medications administered as ordered. Patient complained of some pain overnight, PRN medication administered. Dressing to foot remains CDI. Encouraged to call with needs or concerns. Will continue to monitor.

## 2020-10-07 NOTE — PLAN OF CARE
TN met with pt   recently ended a course of IV abx per [facility]   ---   no hh (pt has [facility])    pt has a rollator, rw, crutches and WC     pt lives with her elderly mother and her sister Shari Roth [phone]        orders/referral sent to [facility] per [facility] ---   Daniel will meet with pt prior to d/c       Follow-up With  Details  Why  Contact Info   Triston Naidu IV, MD  On 10/19/2020  9:20 am - previously booked   [address]  [address]  [place] [address]  [phone]   [email or web address]      [address]  [place] [address]  [phone]        10/07/20 120   Discharge Assessment   Assessment Type Discharge Planning Assessment   Confirmed/corrected address and phone number on facesheet? Yes   Assessment information obtained from? Patient;Medical Record   Communicated expected length of stay with patient/caregiver yes   Prior to hospitilization cognitive status: Alert/Oriented   Prior to hospitalization functional status: Assistive Equipment   Current cognitive status: Alert/Oriented   Current Functional Status: Assistive Equipment   Lives With sibling(s);parent(s)   Able to Return to Prior Arrangements yes   Is patient able to care for self after discharge? Yes   Who are your caregiver(s) and their phone number(s)? sister Shari Kenney      Patient's perception of discharge disposition home or selfcare  (home with IV abx - no HH (pt has [facility]))   Patient currently receives any other outside agency services? No   Equipment Currently Used at Home walker, rolling;rollator;crutches, axillary;wheelchair;other (see comments)  (cam boot to affected leg)   Is the patient taking medications as prescribed? yes   Does the patient have transportation home? Yes   Transportation Anticipated family or friend will provide   Does the patient receive services at the [facility]? No   Discharge Plan A Home with family;Home  (d/c with [facility] - Infusion)   DME Needed  Upon Discharge  none   Patient/Family in Agreement with Plan yes

## 2020-10-07 NOTE — PROGRESS NOTES
Pharmacokinetic Initial Assessment: IV Vancomycin    Assessment/Plan:    Initiate intravenous vancomycin with loading dose of 2000 mg once followed by a maintenance dose of vancomycin 1250mg IV every 24 hours  Desired empiric serum trough concentration is 15 to 20 mcg/mL  Draw vancomycin trough level 60 min prior to third dose on 10/08/20 at approximately 2200  Pharmacy will continue to follow and monitor vancomycin.      Please contact pharmacy at extension [phone] with any questions regarding this assessment.     Thank you for the consult,   Lenard Foote       Patient brief summary:  Taty Kenney is a 60 y.o. female initiated on antimicrobial therapy with IV Vancomycin for treatment of suspected bone/joint infection    Drug Allergies:   Review of patient's allergies indicates:   Allergen Reactions    Augmentin [amoxicillin-pot clavulanate] Other (See Comments)     Stomach cramps       Actual Body Weight:   76kg    Renal Function:   Estimated Creatinine Clearance: 41.1 mL/min (A) (based on SCr of 1.5 mg/dL (H)).,     Dialysis Method (if applicable):  N/A    CBC (last 72 hours):  No results for input(s): WHITE BLOOD CELL COUNT, HEMOGLOBIN, HEMATOCRIT, PLATELETS, GRAN%, LYMPH%, MONO%, EOSINOPHIL%, BASOPHIL%, DIFFERENTIAL METHOD in the last 72 hours.    Metabolic Panel (last 72 hours):  No results for input(s): SODIUM, POTASSIUM, CHLORIDE, CO2, GLUCOSE, BUN BLD, CREATININE, ALBUMIN, BILIRUBIN TOTAL, ALK PHOS, AST, ALT, MAGNESIUM, PHOSPHORUS in the last 72 hours.    Drug levels (last 3 results):  No results for input(s): VANCOMYCINRA, VANCOMYCINPE, VANCOMYCINTR in the last 72 hours.    Microbiologic Results:  Microbiology Results (last 7 days)       Procedure Component Value Units Date/Time    Gram stain [[id number]] Collected: 10/06/20 0843    Order Status: Completed Specimen: Incision site from Foot, Left Updated: 10/06/20 2129     Gram Stain Result No WBC's, epithelial cells or organisms seen    Narrative:       Left medial; ankle #1    Gram stain [[id number]] Collected: 10/06/20 0843    Order Status: Completed Specimen: Incision site from Foot, Left Updated: 10/06/20 2127     Gram Stain Result Rare WBC's      No organisms seen    Narrative:      Left medial; ankle #2    Gram stain [[id number]] Collected: 10/06/20 0830    Order Status: Completed Specimen: Incision site from Foot, Left Updated: 10/06/20 1848     Gram Stain Result No WBC's      No organisms seen    Narrative:      Left lateral ankle #2    Gram stain [[id number]] Collected: 10/06/20 0830    Order Status: Completed Specimen: Incision site from Foot, Left Updated: 10/06/20 1559     Gram Stain Result No WBC's      No organisms seen    Narrative:      Left lateral ankle #1    Culture, Anaerobe [[id number]] Collected: 10/06/20 0843    Order Status: Sent Specimen: Incision site from Foot, Left Updated: 10/06/20 1312    Fungus culture [[id number]] Collected: 10/06/20 0830    Order Status: Sent Specimen: Incision site from Foot, Left Updated: 10/06/20 1311    AFB Culture & Smear [[id number]] Collected: 10/06/20 0830    Order Status: Sent Specimen: Incision site from Foot, Left Updated: 10/06/20 1311    Aerobic culture [[id number]] Collected: 10/06/20 0830    Order Status: Sent Specimen: Incision site from Foot, Left Updated: 10/06/20 1311    Culture, Anaerobe [[id number]] Collected: 10/06/20 0830    Order Status: Sent Specimen: Incision site from Foot, Left Updated: 10/06/20 1311    Fungus culture [[id number]] Collected: 10/06/20 0830    Order Status: Sent Specimen: Incision site from Foot, Left Updated: 10/06/20 1310    Culture, Anaerobe [[id number]] Collected: 10/06/20 0843    Order Status: Sent Specimen: Incision site from Foot, Left Updated: 10/06/20 1310    AFB Culture & Smear [[id number]] Collected: 10/06/20 0843    Order Status: Sent Specimen: Incision site from Foot, Left Updated: 10/06/20 1310    Aerobic culture [[id number]] Collected: 10/06/20 0843    Order Status:  Sent Specimen: Incision site from Foot, Left Updated: 10/06/20 1310    AFB Culture & Smear [[id number]] Collected: 10/06/20 0830    Order Status: Sent Specimen: Incision site from Foot, Left Updated: 10/06/20 1310    Aerobic culture [[id number]] Collected: 10/06/20 0830    Order Status: Sent Specimen: Incision site from Foot, Left Updated: 10/06/20 1310    Culture, Anaerobe [[id number]] Collected: 10/06/20 0830    Order Status: Sent Specimen: Incision site from Foot, Left Updated: 10/06/20 1310    Fungus culture [[id number]] Collected: 10/06/20 0843    Order Status: Sent Specimen: Incision site from Foot, Left Updated: 10/06/20 1310    Fungus culture [[id number]] Collected: 10/06/20 0843    Order Status: Sent Specimen: Incision site from Foot, Left Updated: 10/06/20 1309    AFB Culture & Smear [[id number]] Collected: 10/06/20 0843    Order Status: Sent Specimen: Incision site from Foot, Left Updated: 10/06/20 1309    Aerobic culture [[id number]] Collected: 10/06/20 0843    Order Status: Sent Specimen: Incision site from Foot, Left Updated: 10/06/20 1309

## 2020-10-07 NOTE — PROGRESS NOTES
"[facility] Progress Note     HPI:  60-year-old female with history of L ankle surgery on 7/7/2020 which was complicated by postoperative deep infection which was treated with irrigation and debridement with IV antibiotics on 7/24/2020. She is now 10 weeks status post her irrigation debridement and start of IV antibiotics. At this time wound has improved and completely closed with no evidence of active infection. She completed her course of IV antibiotics and has been on suppressive oral antibiotics, doxycycline and ciprofloxacin.  Now s/p HWR from the left ankle.    Interval:  Afebrile, VSS.  Cultures pending, gram stains negative.  She also notes some anesthesia and paresthesia on the dorsum of the lateral aspect of her left foot.  Endorses pain, worse with movement.  No fevers or chills, no nausea or vomiting.  No cough, chest pain or SOB.     Exam:  Vitals:  /67 (BP Location: Left arm, Patient Position: Lying)   Pulse 73   Temp 97.9 °F (36.6 °C) (Oral)   Resp 16   Ht 5' 6" (1.676 m)   Wt 76.2 kg (168 lb)   SpO2 96%   Breastfeeding No   BMI 27.12 kg/m²   Gen:  Awake and alert, NAD  Resp: No increased WOB  Cards: RRR by PP  Abd:  Non-distended, benign    LLE:  Short leg splint in place  Exposed toes WWP  Calf compartments proximal to splint soft and compressible  EHL/FHL intact  SILT exposed toes     Labs:  Gram stain negative  Cultures pending     Assessment/Plan:   60-year-old female with history of L ankle surgery ORIF complicated by deep infection, treated with IV antibiotics, now s/p HWR on 10/6/2020.  - Appreciate ID recs:  Recommend 6 weeks of vancomycin and ceftriaxone 2 g q24h  - Social work for home IV antibiotic coordination  - PICC line placed yesterday  - DVT ppx on DC with aspirin 81mg daily for 4 weeks  - Pain control:  DC with Nocro 5mg  - NWB LLE  - Follow up in Dr. Naidu clinic in 2 weeks    Dispo:  Planned DC today once home IV abx set up    Carolyn Chi MD, PGY-3  [facility] " Surgery  Pager: [phone]

## 2020-10-07 NOTE — PLAN OF CARE
Patient has received discharge instructions. Prescriptions received. Instructions reviewed with pt using teachback method. All questions answered to pt satisfaction. IV access (PICC) left in place for home antibiotic therapy . Transport will be requested for discharge when her ride arrives on campus.

## 2020-10-09 LAB
BACTERIA SPEC AEROBE CULT: NO GROWTH

## 2020-10-13 LAB
BACTERIA SPEC ANAEROBE CULT: NORMAL

## 2020-10-14 ENCOUNTER — LAB VISIT (OUTPATIENT)
Dept: LAB | Facility: HOSPITAL | Age: 60
End: 2020-10-14
Attending: INTERNAL MEDICINE
Payer: MEDICAID

## 2020-10-14 DIAGNOSIS — R78.81 BACTEREMIA: Primary | ICD-10-CM

## 2020-10-14 LAB
ALBUMIN SERPL BCP-MCNC: 3.2 G/DL (ref 3.5–5.2)
ALP SERPL-CCNC: 89 U/L (ref 55–135)
ALT SERPL W/O P-5'-P-CCNC: 16 U/L (ref 10–44)
ANION GAP SERPL CALC-SCNC: 7 MMOL/L (ref 8–16)
AST SERPL-CCNC: 25 U/L (ref 10–40)
BASOPHILS # BLD AUTO: 0.02 K/UL (ref 0–0.2)
BASOPHILS NFR BLD: 0.4 % (ref 0–1.9)
BILIRUB SERPL-MCNC: 0.1 MG/DL (ref 0.1–1)
BUN SERPL-MCNC: 19 MG/DL (ref 8–23)
CALCIUM SERPL-MCNC: 9.2 MG/DL (ref 8.7–10.5)
CHLORIDE SERPL-SCNC: 105 MMOL/L (ref 95–110)
CO2 SERPL-SCNC: 27 MMOL/L (ref 23–29)
CREAT SERPL-MCNC: 1.3 MG/DL (ref 0.5–1.4)
CRP SERPL-MCNC: 13.6 MG/L (ref 0–8.2)
DIFFERENTIAL METHOD: ABNORMAL
EOSINOPHIL # BLD AUTO: 0.3 K/UL (ref 0–0.5)
EOSINOPHIL NFR BLD: 5.6 % (ref 0–8)
ERYTHROCYTE [DISTWIDTH] IN BLOOD BY AUTOMATED COUNT: 14.8 % (ref 11.5–14.5)
ERYTHROCYTE [SEDIMENTATION RATE] IN BLOOD BY WESTERGREN METHOD: 60 MM/HR (ref 0–36)
EST. GFR  (AFRICAN AMERICAN): 51.2 ML/MIN/1.73 M^2
EST. GFR  (NON AFRICAN AMERICAN): 44.4 ML/MIN/1.73 M^2
GLUCOSE SERPL-MCNC: 106 MG/DL (ref 70–110)
HCT VFR BLD AUTO: 30.9 % (ref 37–48.5)
HGB BLD-MCNC: 10 G/DL (ref 12–16)
IMM GRANULOCYTES # BLD AUTO: 0.01 K/UL (ref 0–0.04)
IMM GRANULOCYTES NFR BLD AUTO: 0.2 % (ref 0–0.5)
LYMPHOCYTES # BLD AUTO: 1.7 K/UL (ref 1–4.8)
LYMPHOCYTES NFR BLD: 32 % (ref 18–48)
MCH RBC QN AUTO: 29.2 PG (ref 27–31)
MCHC RBC AUTO-ENTMCNC: 32.4 G/DL (ref 32–36)
MCV RBC AUTO: 90 FL (ref 82–98)
MONOCYTES # BLD AUTO: 0.3 K/UL (ref 0.3–1)
MONOCYTES NFR BLD: 6 % (ref 4–15)
NEUTROPHILS # BLD AUTO: 3 K/UL (ref 1.8–7.7)
NEUTROPHILS NFR BLD: 55.8 % (ref 38–73)
NRBC BLD-RTO: 0 /100 WBC
PLATELET # BLD AUTO: 154 K/UL (ref 150–350)
PMV BLD AUTO: 12.5 FL (ref 9.2–12.9)
POTASSIUM SERPL-SCNC: 4.3 MMOL/L (ref 3.5–5.1)
PROT SERPL-MCNC: 6.9 G/DL (ref 6–8.4)
RBC # BLD AUTO: 3.42 M/UL (ref 4–5.4)
SODIUM SERPL-SCNC: 139 MMOL/L (ref 136–145)
VANCOMYCIN TROUGH SERPL-MCNC: 25.2 UG/ML (ref 10–22)
WBC # BLD AUTO: 5.35 K/UL (ref 3.9–12.7)

## 2020-10-14 PROCEDURE — 80202 ASSAY OF VANCOMYCIN: CPT

## 2020-10-14 PROCEDURE — 85025 COMPLETE CBC W/AUTO DIFF WBC: CPT

## 2020-10-14 PROCEDURE — 86140 C-REACTIVE PROTEIN: CPT

## 2020-10-14 PROCEDURE — 85652 RBC SED RATE AUTOMATED: CPT

## 2020-10-14 PROCEDURE — 80053 COMPREHEN METABOLIC PANEL: CPT

## 2020-10-19 ENCOUNTER — HOSPITAL ENCOUNTER (OUTPATIENT)
Dept: RADIOLOGY | Facility: HOSPITAL | Age: 60
Discharge: HOME | End: 2020-10-19
Attending: ORTHOPAEDIC SURGERY
Payer: MEDICAID

## 2020-10-19 DIAGNOSIS — S82.852D DISPLACED TRIMALLEOLAR FRACTURE OF LEFT LOWER LEG, SUBSEQUENT ENCOUNTER FOR CLOSED FRACTURE WITH ROUTINE HEALING: ICD-10-CM

## 2020-10-19 PROCEDURE — 73610 X-RAY EXAM OF ANKLE: CPT | Mod: TC,FY,LT

## 2020-10-19 PROCEDURE — 73610 X-RAY EXAM OF ANKLE: CPT | Mod: 26,LT,, | Performed by: RADIOLOGY

## 2020-10-19 PROCEDURE — 73610 XR ANKLE COMPLETE 3 VIEW LEFT: ICD-10-PCS | Mod: 26,LT,, | Performed by: RADIOLOGY

## 2020-10-21 ENCOUNTER — LAB VISIT (OUTPATIENT)
Dept: LAB | Facility: HOSPITAL | Age: 60
End: 2020-10-21
Attending: INTERNAL MEDICINE
Payer: MEDICAID

## 2020-10-21 DIAGNOSIS — R78.81 BACTEREMIA: Primary | ICD-10-CM

## 2020-10-21 LAB
ALBUMIN SERPL BCP-MCNC: 3.3 G/DL (ref 3.5–5.2)
ALP SERPL-CCNC: 105 U/L (ref 55–135)
ALT SERPL W/O P-5'-P-CCNC: 20 U/L (ref 10–44)
ANION GAP SERPL CALC-SCNC: 10 MMOL/L (ref 8–16)
AST SERPL-CCNC: 22 U/L (ref 10–40)
BASOPHILS # BLD AUTO: 0.06 K/UL (ref 0–0.2)
BASOPHILS NFR BLD: 1.2 % (ref 0–1.9)
BILIRUB SERPL-MCNC: 0.2 MG/DL (ref 0.1–1)
BUN SERPL-MCNC: 19 MG/DL (ref 8–23)
CALCIUM SERPL-MCNC: 9 MG/DL (ref 8.7–10.5)
CHLORIDE SERPL-SCNC: 106 MMOL/L (ref 95–110)
CO2 SERPL-SCNC: 22 MMOL/L (ref 23–29)
CREAT SERPL-MCNC: 1.3 MG/DL (ref 0.5–1.4)
CRP SERPL-MCNC: 19.6 MG/L (ref 0–8.2)
DIFFERENTIAL METHOD: ABNORMAL
EOSINOPHIL # BLD AUTO: 0.5 K/UL (ref 0–0.5)
EOSINOPHIL NFR BLD: 9.2 % (ref 0–8)
ERYTHROCYTE [DISTWIDTH] IN BLOOD BY AUTOMATED COUNT: 15.1 % (ref 11.5–14.5)
ERYTHROCYTE [SEDIMENTATION RATE] IN BLOOD BY WESTERGREN METHOD: 75 MM/HR (ref 0–36)
EST. GFR  (AFRICAN AMERICAN): 51.2 ML/MIN/1.73 M^2
EST. GFR  (NON AFRICAN AMERICAN): 44.4 ML/MIN/1.73 M^2
GLUCOSE SERPL-MCNC: 108 MG/DL (ref 70–110)
HCT VFR BLD AUTO: 30 % (ref 37–48.5)
HGB BLD-MCNC: 9.3 G/DL (ref 12–16)
IMM GRANULOCYTES # BLD AUTO: 0.01 K/UL (ref 0–0.04)
IMM GRANULOCYTES NFR BLD AUTO: 0.2 % (ref 0–0.5)
LYMPHOCYTES # BLD AUTO: 1.5 K/UL (ref 1–4.8)
LYMPHOCYTES NFR BLD: 29 % (ref 18–48)
MCH RBC QN AUTO: 28.6 PG (ref 27–31)
MCHC RBC AUTO-ENTMCNC: 31 G/DL (ref 32–36)
MCV RBC AUTO: 92 FL (ref 82–98)
MONOCYTES # BLD AUTO: 0.4 K/UL (ref 0.3–1)
MONOCYTES NFR BLD: 8.3 % (ref 4–15)
NEUTROPHILS # BLD AUTO: 2.7 K/UL (ref 1.8–7.7)
NEUTROPHILS NFR BLD: 52.1 % (ref 38–73)
NRBC BLD-RTO: 0 /100 WBC
PLATELET # BLD AUTO: 197 K/UL (ref 150–350)
PMV BLD AUTO: 12.1 FL (ref 9.2–12.9)
POTASSIUM SERPL-SCNC: 4.1 MMOL/L (ref 3.5–5.1)
PROT SERPL-MCNC: 6.7 G/DL (ref 6–8.4)
RBC # BLD AUTO: 3.25 M/UL (ref 4–5.4)
SODIUM SERPL-SCNC: 138 MMOL/L (ref 136–145)
VANCOMYCIN TROUGH SERPL-MCNC: 28.8 UG/ML (ref 10–22)
WBC # BLD AUTO: 5.21 K/UL (ref 3.9–12.7)

## 2020-10-21 PROCEDURE — 85652 RBC SED RATE AUTOMATED: CPT

## 2020-10-21 PROCEDURE — 85025 COMPLETE CBC W/AUTO DIFF WBC: CPT

## 2020-10-21 PROCEDURE — 80202 ASSAY OF VANCOMYCIN: CPT

## 2020-10-21 PROCEDURE — 80053 COMPREHEN METABOLIC PANEL: CPT

## 2020-10-21 PROCEDURE — 86140 C-REACTIVE PROTEIN: CPT

## 2020-10-22 RX ORDER — HYDROCODONE BITARTRATE AND ACETAMINOPHEN 5; 325 MG/1; MG/1
1 TABLET ORAL EVERY 6 HOURS PRN
Qty: 28 TABLET | Refills: 0 | OUTPATIENT
Start: 2020-10-22

## 2020-10-23 RX ORDER — HYDROCODONE BITARTRATE AND ACETAMINOPHEN 5; 325 MG/1; MG/1
1 TABLET ORAL EVERY 6 HOURS PRN
Qty: 28 TABLET | Refills: 0 | OUTPATIENT
Start: 2020-10-23

## 2020-10-26 ENCOUNTER — LAB VISIT (OUTPATIENT)
Dept: LAB | Facility: HOSPITAL | Age: 60
End: 2020-10-26
Attending: INTERNAL MEDICINE
Payer: MEDICAID

## 2020-10-26 DIAGNOSIS — R78.81 BACTEREMIA: Primary | ICD-10-CM

## 2020-10-26 LAB
ANION GAP SERPL CALC-SCNC: 10 MMOL/L (ref 8–16)
BUN SERPL-MCNC: 17 MG/DL (ref 8–23)
CALCIUM SERPL-MCNC: 9 MG/DL (ref 8.7–10.5)
CHLORIDE SERPL-SCNC: 107 MMOL/L (ref 95–110)
CO2 SERPL-SCNC: 21 MMOL/L (ref 23–29)
CREAT SERPL-MCNC: 1.2 MG/DL (ref 0.5–1.4)
EST. GFR  (AFRICAN AMERICAN): 56.4 ML/MIN/1.73 M^2
EST. GFR  (NON AFRICAN AMERICAN): 48.9 ML/MIN/1.73 M^2
GLUCOSE SERPL-MCNC: 102 MG/DL (ref 70–110)
POTASSIUM SERPL-SCNC: 4.4 MMOL/L (ref 3.5–5.1)
SODIUM SERPL-SCNC: 138 MMOL/L (ref 136–145)
VANCOMYCIN TROUGH SERPL-MCNC: 35.4 UG/ML (ref 10–22)

## 2020-10-26 PROCEDURE — 80202 ASSAY OF VANCOMYCIN: CPT

## 2020-10-26 PROCEDURE — 80048 BASIC METABOLIC PNL TOTAL CA: CPT

## 2020-10-27 RX ORDER — DOXYCYCLINE HYCLATE 100 MG/1
100 TABLET, DELAYED RELEASE ORAL 2 TIMES DAILY
Qty: 60 TABLET | Refills: 3 | Status: SHIPPED | OUTPATIENT
Start: 2020-10-27 | End: 2021-06-22

## 2020-11-02 ENCOUNTER — LAB VISIT (OUTPATIENT)
Dept: LAB | Facility: HOSPITAL | Age: 60
End: 2020-11-02
Attending: INTERNAL MEDICINE
Payer: MEDICAID

## 2020-11-02 DIAGNOSIS — R78.81 BACTEREMIA: Primary | ICD-10-CM

## 2020-11-02 LAB
ALBUMIN SERPL BCP-MCNC: 3.9 G/DL (ref 3.5–5.2)
ALP SERPL-CCNC: 114 U/L (ref 55–135)
ALT SERPL W/O P-5'-P-CCNC: 20 U/L (ref 10–44)
ANION GAP SERPL CALC-SCNC: 13 MMOL/L (ref 8–16)
AST SERPL-CCNC: 29 U/L (ref 10–40)
BASOPHILS # BLD AUTO: 0.05 K/UL (ref 0–0.2)
BASOPHILS NFR BLD: 0.9 % (ref 0–1.9)
BILIRUB SERPL-MCNC: 0.6 MG/DL (ref 0.1–1)
BUN SERPL-MCNC: 16 MG/DL (ref 8–23)
CALCIUM SERPL-MCNC: 9.1 MG/DL (ref 8.7–10.5)
CHLORIDE SERPL-SCNC: 102 MMOL/L (ref 95–110)
CO2 SERPL-SCNC: 23 MMOL/L (ref 23–29)
CREAT SERPL-MCNC: 1.5 MG/DL (ref 0.5–1.4)
CRP SERPL-MCNC: 12.4 MG/L (ref 0–8.2)
DIFFERENTIAL METHOD: ABNORMAL
EOSINOPHIL # BLD AUTO: 0.1 K/UL (ref 0–0.5)
EOSINOPHIL NFR BLD: 1.3 % (ref 0–8)
ERYTHROCYTE [DISTWIDTH] IN BLOOD BY AUTOMATED COUNT: 14.6 % (ref 11.5–14.5)
ERYTHROCYTE [SEDIMENTATION RATE] IN BLOOD BY WESTERGREN METHOD: 52 MM/HR (ref 0–36)
EST. GFR  (AFRICAN AMERICAN): 43 ML/MIN/1.73 M^2
EST. GFR  (NON AFRICAN AMERICAN): 37.3 ML/MIN/1.73 M^2
GLUCOSE SERPL-MCNC: 106 MG/DL (ref 70–110)
HCT VFR BLD AUTO: 32.4 % (ref 37–48.5)
HGB BLD-MCNC: 10.4 G/DL (ref 12–16)
IMM GRANULOCYTES # BLD AUTO: 0.01 K/UL (ref 0–0.04)
IMM GRANULOCYTES NFR BLD AUTO: 0.2 % (ref 0–0.5)
LYMPHOCYTES # BLD AUTO: 1.8 K/UL (ref 1–4.8)
LYMPHOCYTES NFR BLD: 32.5 % (ref 18–48)
MCH RBC QN AUTO: 29.1 PG (ref 27–31)
MCHC RBC AUTO-ENTMCNC: 32.1 G/DL (ref 32–36)
MCV RBC AUTO: 91 FL (ref 82–98)
MONOCYTES # BLD AUTO: 0.4 K/UL (ref 0.3–1)
MONOCYTES NFR BLD: 7 % (ref 4–15)
NEUTROPHILS # BLD AUTO: 3.2 K/UL (ref 1.8–7.7)
NEUTROPHILS NFR BLD: 58.1 % (ref 38–73)
NRBC BLD-RTO: 0 /100 WBC
PLATELET # BLD AUTO: 178 K/UL (ref 150–350)
PMV BLD AUTO: 12.5 FL (ref 9.2–12.9)
POTASSIUM SERPL-SCNC: 4 MMOL/L (ref 3.5–5.1)
PROT SERPL-MCNC: 7.1 G/DL (ref 6–8.4)
RBC # BLD AUTO: 3.58 M/UL (ref 4–5.4)
SODIUM SERPL-SCNC: 138 MMOL/L (ref 136–145)
VANCOMYCIN TROUGH SERPL-MCNC: 2.6 UG/ML (ref 10–22)
WBC # BLD AUTO: 5.57 K/UL (ref 3.9–12.7)

## 2020-11-02 PROCEDURE — 85025 COMPLETE CBC W/AUTO DIFF WBC: CPT

## 2020-11-02 PROCEDURE — 80053 COMPREHEN METABOLIC PANEL: CPT

## 2020-11-02 PROCEDURE — 86140 C-REACTIVE PROTEIN: CPT

## 2020-11-02 PROCEDURE — 85652 RBC SED RATE AUTOMATED: CPT

## 2020-11-02 PROCEDURE — 80202 ASSAY OF VANCOMYCIN: CPT

## 2020-11-09 LAB
FUNGUS SPEC CULT: NORMAL

## 2020-11-23 ENCOUNTER — OFFICE VISIT (OUTPATIENT)
Dept: FAMILY MEDICINE | Facility: HOSPITAL | Age: 60
End: 2020-11-23
Attending: INTERNAL MEDICINE
Payer: MEDICAID

## 2020-11-23 VITALS
DIASTOLIC BLOOD PRESSURE: 85 MMHG | SYSTOLIC BLOOD PRESSURE: 125 MMHG | WEIGHT: 168 LBS | BODY MASS INDEX: 27 KG/M2 | HEIGHT: 66 IN | HEART RATE: 79 BPM

## 2020-11-23 DIAGNOSIS — T81.42XA DEEP POSTOPERATIVE WOUND INFECTION: ICD-10-CM

## 2020-11-23 PROCEDURE — 99213 OFFICE O/P EST LOW 20 MIN: CPT | Performed by: INTERNAL MEDICINE

## 2020-11-23 NOTE — PROGRESS NOTES
"Subjective:       Patient ID: Taty Kenney is a 60 y.o. female.    Chief Complaint: Follow-up and Medication Problem (Doxycycline)    Ms. Taty Kenney is a 60-year-old female presenting for follow-up for her postoperative wound infection following a ORIF from a trimalleolar fracture.  Wound culture was positive for MRSA and Enterobacter cloacae, and patient was received vancomycin 1750mg and cefepime 2 g Q8H for a total course of 6 weeks. She subsequently had the hardware removed and received another roughly 5 week course of vanco and ctx. The wound has healed. She is now on doxy x 2 weeks. It makes her nauseous     Follow-up  Associated symptoms include arthralgias and joint swelling (left lower extremity). Pertinent negatives include no chest pain, chills, fever or rash.     Review of Systems   Constitutional: Negative for chills and fever.   Respiratory: Negative for shortness of breath.    Cardiovascular: Negative for chest pain.   Musculoskeletal: Positive for arthralgias and joint swelling (left lower extremity). Negative for gait problem. Back pain: Left lower extremity.   Skin: Negative for color change and rash.       +ankle scar      Objective:      Vitals:    11/23/20 0856   BP: 125/85   Pulse: 79   Weight: 76.2 kg (167 lb 15.9 oz)   Height: 5' 6" (1.676 m)         Physical Exam  Constitutional:       General: She is not in acute distress.     Appearance: Normal appearance. She is not ill-appearing, toxic-appearing or diaphoretic.      Comments: Female in wheelchair with left lower extremity wound dressing.   HENT:      Head: Normocephalic and atraumatic.   Eyes:      Extraocular Movements: Extraocular movements intact.      Pupils: Pupils are equal, round, and reactive to light.   Cardiovascular:      Rate and Rhythm: Normal rate and regular rhythm.      Heart sounds: No murmur. No friction rub. No gallop.    Pulmonary:      Effort: No respiratory distress.      Breath sounds: No " wheezing, rhonchi or rales.   Abdominal:      General: Abdomen is flat. There is no distension.      Palpations: Abdomen is soft.      Tenderness: There is no abdominal tenderness. There is no guarding.   Musculoskeletal:         General: Swelling and signs of injury (Left lower extremity ORIF) present.      Comments: Healed wound    Skin:     General: Skin is warm and dry.      Findings: No bruising, erythema or rash.   Neurological:      Mental Status: She is alert and oriented to person, place, and time.   Psychiatric:         Thought Content: Thought content normal.         Assessment:       1. Deep postoperative wound infection        Plan:       Deep postoperative wound infection      60-year-old female who developed a postoperative wound infection following ORIF from trimalleolar fracture. Plan is to continue vancomycin and cefepime for a total duration of 6 weeks. This was followed by hardware removal and another 5 weeks of vanco and ctx. No on 2 weeks of doxy    -given duration of antibiotics and removal of hardware, will stop antibiotics  -RTC in 3 weeks with crp and sed rate

## 2020-12-08 LAB
ACID FAST MOD KINY STN SPEC: NORMAL
MYCOBACTERIUM SPEC QL CULT: NORMAL

## 2020-12-10 ENCOUNTER — TELEPHONE (OUTPATIENT)
Dept: ADMINISTRATIVE | Facility: OTHER | Age: 60
End: 2020-12-10

## 2020-12-14 ENCOUNTER — OFFICE VISIT (OUTPATIENT)
Dept: INFECTIOUS DISEASES | Facility: CLINIC | Age: 60
End: 2020-12-14
Attending: INTERNAL MEDICINE
Payer: MEDICAID

## 2020-12-14 ENCOUNTER — OFFICE VISIT (OUTPATIENT)
Dept: ORTHOPEDICS | Facility: CLINIC | Age: 60
End: 2020-12-14
Payer: MEDICAID

## 2020-12-14 ENCOUNTER — LAB VISIT (OUTPATIENT)
Dept: LAB | Facility: HOSPITAL | Age: 60
End: 2020-12-14
Attending: INTERNAL MEDICINE
Payer: MEDICAID

## 2020-12-14 VITALS
HEART RATE: 91 BPM | HEIGHT: 67 IN | WEIGHT: 180 LBS | DIASTOLIC BLOOD PRESSURE: 81 MMHG | SYSTOLIC BLOOD PRESSURE: 122 MMHG | BODY MASS INDEX: 28.25 KG/M2

## 2020-12-14 VITALS
BODY MASS INDEX: 28.25 KG/M2 | HEIGHT: 67 IN | WEIGHT: 180 LBS | HEART RATE: 91 BPM | DIASTOLIC BLOOD PRESSURE: 81 MMHG | SYSTOLIC BLOOD PRESSURE: 122 MMHG

## 2020-12-14 DIAGNOSIS — T84.84XA PAINFUL ORTHOPAEDIC HARDWARE: Primary | ICD-10-CM

## 2020-12-14 DIAGNOSIS — S82.852A CLOSED TRIMALLEOLAR FRACTURE OF LEFT ANKLE, INITIAL ENCOUNTER: ICD-10-CM

## 2020-12-14 DIAGNOSIS — G89.29 CHRONIC LOW BACK PAIN WITHOUT SCIATICA, UNSPECIFIED BACK PAIN LATERALITY: ICD-10-CM

## 2020-12-14 DIAGNOSIS — A49.02 MRSA (METHICILLIN RESISTANT STAPHYLOCOCCUS AUREUS) INFECTION: Primary | ICD-10-CM

## 2020-12-14 DIAGNOSIS — T81.42XA DEEP POSTOPERATIVE WOUND INFECTION: ICD-10-CM

## 2020-12-14 DIAGNOSIS — M54.50 CHRONIC LOW BACK PAIN WITHOUT SCIATICA, UNSPECIFIED BACK PAIN LATERALITY: ICD-10-CM

## 2020-12-14 DIAGNOSIS — A49.02 MRSA (METHICILLIN RESISTANT STAPHYLOCOCCUS AUREUS) INFECTION: ICD-10-CM

## 2020-12-14 LAB
CRP SERPL-MCNC: 13.1 MG/L (ref 0–8.2)
ERYTHROCYTE [SEDIMENTATION RATE] IN BLOOD BY WESTERGREN METHOD: 48 MM/HR (ref 0–20)

## 2020-12-14 PROCEDURE — 99999 PR PBB SHADOW E&M-EST. PATIENT-LVL IV: ICD-10-PCS | Mod: PBBFAC,,, | Performed by: ORTHOPAEDIC SURGERY

## 2020-12-14 PROCEDURE — 99213 PR OFFICE/OUTPT VISIT, EST, LEVL III, 20-29 MIN: ICD-10-PCS | Mod: S$PBB,,, | Performed by: INTERNAL MEDICINE

## 2020-12-14 PROCEDURE — 85652 RBC SED RATE AUTOMATED: CPT

## 2020-12-14 PROCEDURE — 99999 PR PBB SHADOW E&M-EST. PATIENT-LVL IV: CPT | Mod: PBBFAC,,, | Performed by: ORTHOPAEDIC SURGERY

## 2020-12-14 PROCEDURE — 99213 OFFICE O/P EST LOW 20 MIN: CPT | Mod: PBBFAC,PN | Performed by: INTERNAL MEDICINE

## 2020-12-14 PROCEDURE — 99024 POSTOP FOLLOW-UP VISIT: CPT | Mod: ,,, | Performed by: ORTHOPAEDIC SURGERY

## 2020-12-14 PROCEDURE — 36415 COLL VENOUS BLD VENIPUNCTURE: CPT

## 2020-12-14 PROCEDURE — 99024 PR POST-OP FOLLOW-UP VISIT: ICD-10-PCS | Mod: ,,, | Performed by: ORTHOPAEDIC SURGERY

## 2020-12-14 PROCEDURE — 99214 OFFICE O/P EST MOD 30 MIN: CPT | Mod: PBBFAC,PN | Performed by: ORTHOPAEDIC SURGERY

## 2020-12-14 PROCEDURE — 86140 C-REACTIVE PROTEIN: CPT

## 2020-12-14 PROCEDURE — 99213 OFFICE O/P EST LOW 20 MIN: CPT | Mod: S$PBB,,, | Performed by: INTERNAL MEDICINE

## 2020-12-14 RX ORDER — GABAPENTIN 300 MG/1
CAPSULE ORAL
COMMUNITY
Start: 2020-12-08 | End: 2021-06-22

## 2020-12-14 NOTE — PROGRESS NOTES
"Subjective:       Patient ID: Taty Kenney is a 60 y.o. female.    Chief Complaint: Wound Infection    Follow-up  Associated symptoms include arthralgias and joint swelling (left lower extremity). Pertinent negatives include no chest pain, chills, fever or rash.     Subjective:       Patient ID: Taty Kenney is a 60 y.o. female.    Chief Complaint: Wound Infection    Ms. Taty Kenney is a 60-year-old female presenting for follow-up for her postoperative wound infection following a ORIF from a trimalleolar fracture.  Wound culture was positive for MRSA and Enterobacter cloacae, and patient was received vancomycin 1750mg and cefepime 2 g Q8H for a total course of 6 weeks. She subsequently had the hardware removed and received another roughly 5 week course of vanco and ctx. The wound has healed. She also took doxy x 2 weeks. It makes her nauseous     Follow-up  Associated symptoms include arthralgias and joint swelling (left lower extremity). Pertinent negatives include no chest pain, chills, fever or rash.     Review of Systems   Constitutional: Negative for chills and fever.   Respiratory: Negative for shortness of breath.    Cardiovascular: Negative for chest pain.   Musculoskeletal: Positive for arthralgias and joint swelling (left lower extremity). Negative for gait problem. Back pain: Left lower extremity.   Skin: Negative for color change and rash.       +ankle scar      Objective:      Vitals:    12/14/20 0852   BP: 122/81   BP Location: Left arm   Patient Position: Sitting   Pulse: 91   Weight: 81.6 kg (180 lb)   Height: 5' 7" (1.702 m)         Physical Exam  Constitutional:       General: She is not in acute distress.     Appearance: Normal appearance. She is not ill-appearing, toxic-appearing or diaphoretic.      Comments: Female in wheelchair with left lower extremity wound dressing.   HENT:      Head: Normocephalic and atraumatic.   Eyes:      Extraocular Movements: Extraocular " movements intact.      Pupils: Pupils are equal, round, and reactive to light.   Cardiovascular:      Rate and Rhythm: Normal rate and regular rhythm.      Heart sounds: No murmur. No friction rub. No gallop.    Pulmonary:      Effort: No respiratory distress.      Breath sounds: No wheezing, rhonchi or rales.   Abdominal:      General: Abdomen is flat. There is no distension.      Palpations: Abdomen is soft.      Tenderness: There is no abdominal tenderness. There is no guarding.   Musculoskeletal:         General: Swelling and signs of injury (Left lower extremity ORIF) present.      Comments: Healed wound    Skin:     General: Skin is warm and dry.      Findings: No bruising, erythema or rash.   Neurological:      Mental Status: She is alert and oriented to person, place, and time.   Psychiatric:         Thought Content: Thought content normal.         Assessment:       1. MRSA (methicillin resistant Staphylococcus aureus) infection        Plan:       MRSA (methicillin resistant Staphylococcus aureus) infection  -     C-REACTIVE PROTEIN; Future; Expected date: 12/14/2020  -     Sedimentation rate; Future; Expected date: 12/14/2020      60-year-old female who developed a postoperative wound infection following ORIF from trimalleolar fracture. She completed vancomycin and cefepime for a total duration of 6 weeks. This was followed by hardware removal and another 5 weeks of vanco and ctx.Then completed 2 weeks of doxy    -will check crp and esr  -RTC PRN

## 2020-12-14 NOTE — PROGRESS NOTES
Subjective:      Patient ID: Taty Kenney is a 60 y.o. female.    Chief Complaint: Post-op Evaluation of the Left Ankle    HPI   60-year-old female returns in follow-up of her left ankle.  She had left ankle removal of hardware on 10/06/2020 which was status post open reduction internal fixation of a left bimalleolar ankle fracture on 07/07/2020 complicated by postoperative infection treated with irrigation and debridement on 07/24/2020 followed by IV antibiotics.  She presented to see me today after her follow-up visit with Infectious Disease earlier today.  Overall she is doing well but still has some discomfort with swelling over the anterior and medial aspect of her ankle.  She experiences some persistent paresthesias over the dorsal lateral aspect of her foot.  She denies any fevers or chills or problems with the wounds.  She continues to walk in the CAM walker boot and states she feels unstable when walking without a CAM walker boot.  The patient is also complaining of chronic low back pain with pain radiating into her left leg.  She feels this is the result of asymmetric walking well ambulating in the boot.  She has had no treatment for this in the past.  No other issues noted at this time.    Social History     Occupational History    Not on file   Tobacco Use    Smoking status: Former Smoker   Substance and Sexual Activity    Alcohol use: No    Drug use: No    Sexual activity: Not on file      Review of Systems   Constitution: Negative for chills and fever.   HENT: Negative for hearing loss.    Eyes: Negative for blurred vision.   Cardiovascular: Negative for chest pain.   Respiratory: Negative for shortness of breath.    Gastrointestinal: Negative for abdominal pain.   Neurological: Negative for dizziness.   Psychiatric/Behavioral: Negative for altered mental status.         Objective:    Right Ankle Exam   Right ankle exam is normal.         Left ankle examined and found to have well-healed  surgical incisions over the medial and lateral ankle.  No erythema or induration noted.  She does have some boggy swelling over the anterior and anterolateral aspect of her ankle which is nontender to palpation.  Dorsiflexion of the ankles approximately 15° and plantar flexion approximately 45° of the left ankle.  She has pain with inversion and eversion of the ankle.  Gross motor function is normal throughout the extremity and throughout the foot and ankle.  The sensation is decreased over the distribution of the superficial peroneal nerve on the dorsum of the foot.  The remainder of the sensation is intact throughout the extremity.  The digits are warm well perfused.      Assessment:       1. Painful orthopaedic hardware    2. Deep postoperative wound infection    3. Closed trimalleolar fracture of left ankle, initial encounter    4. Chronic low back pain without sciatica, unspecified back pain laterality          Plan:       1.  Order physical therapy for gait retraining  2.  Tylenol 650 mg t.i.d. as needed for pain  3.  Continue to wear a Cam walker boot x1 more week then discontinue and proceed to weightbear as tolerated with no boot  4.  This time the patient can weightbear as tolerated in the boot for 1 more week.   5.  Order placed for pain management referral regarding low back pain.

## 2020-12-15 ENCOUNTER — CLINICAL SUPPORT (OUTPATIENT)
Dept: REHABILITATION | Facility: HOSPITAL | Age: 60
End: 2020-12-15
Payer: MEDICAID

## 2020-12-15 DIAGNOSIS — G89.29 CHRONIC PAIN OF LEFT ANKLE: ICD-10-CM

## 2020-12-15 DIAGNOSIS — T84.84XA PAINFUL ORTHOPAEDIC HARDWARE: ICD-10-CM

## 2020-12-15 DIAGNOSIS — S82.852A CLOSED TRIMALLEOLAR FRACTURE OF LEFT ANKLE, INITIAL ENCOUNTER: ICD-10-CM

## 2020-12-15 DIAGNOSIS — M25.572 CHRONIC PAIN OF LEFT ANKLE: ICD-10-CM

## 2020-12-15 DIAGNOSIS — M25.672 DECREASED RANGE OF MOTION OF LEFT ANKLE: ICD-10-CM

## 2020-12-15 DIAGNOSIS — R29.898 ANKLE WEAKNESS: ICD-10-CM

## 2020-12-15 DIAGNOSIS — T81.42XA DEEP POSTOPERATIVE WOUND INFECTION: ICD-10-CM

## 2020-12-15 PROBLEM — M25.579 ANKLE PAIN: Status: ACTIVE | Noted: 2020-12-15

## 2020-12-15 PROBLEM — M25.673 DECREASED ROM OF ANKLE: Status: ACTIVE | Noted: 2020-12-15

## 2020-12-15 PROCEDURE — 97161 PT EVAL LOW COMPLEX 20 MIN: CPT

## 2020-12-15 NOTE — PLAN OF CARE
[facility]  Physical Therapy Initial Evaluation      Name: Taty Clinton [facility] Number: [id number]    Therapy Diagnosis:   Encounter Diagnoses   Name Primary?    Painful orthopaedic hardware     Deep postoperative wound infection     Closed trimalleolar fracture of left ankle, initial encounter     Chronic pain of left ankle     Ankle weakness     Decreased range of motion of left ankle      Physician: Triston Naidu IV, MD    Physician Orders: PT Eval and Treat   Medical Diagnosis from Referral: M54.5,G89.29 (ICD-10-CM) - Chronic low back pain without sciatica, unspecified back pain laterality  T84.84XA (ICD-10-CM) - Painful orthopaedic hardware   T81.42XA (ICD-10-CM) - Deep postoperative wound infection   S82.852A (ICD-10-CM) - Closed trimalleolar fracture of left ankle, initial encounter   Evaluation Date: 12/15/2020  Authorization Period Expiration: 12/31/2020  Plan of Care Expiration: 2/26/2021  Visit # / Visits authorized: 1/ 12    Time In: 925 (arrived late)  Time Out: 945  Total Billable Time: 0 minutes      Precautions: Standard    Subjective   Date of onset: July 7th  History of current condition - Taty reports: having surgery on her L foot which included an ORIF for a trimalleolar fracture. Patient reports she developed MRSA two weeks after the surgery. Patient was in and out of the hospital to treat her infection. Patient reports she had the hardware removed on October 6th to reduce the risk of further infection growing on the hardware. After the removal of the hardware, patient was on several medications to help treat any other remaining infection. Patient reports she went to the doctor yesterday to receive blood work but did not get any results yet. Patient reports she is cleared to get out of the boot and is weight bearing as tolerated, but she is scared to get out of the boot because her foot becomes numb when she stands for a long time. Patient reports she is  off all of her antibiotics and her doctor believes the infection is resolved. Patient reports she is awaiting her blood work to confirm the infection is gone.      Medical History:   Past Medical History:   Diagnosis Date    Anxiety     Depression     Hypertension        Surgical History:   Taty Kenney  has a past surgical history that includes Hysterectomy; Open reduction and internal fixation (ORIF) of injury of ankle (Left, 7/7/2020); Irrigation and debridement of lower extremity (Left, 7/24/2020); and Ankle hardware removal (Left, 10/6/2020).    Medications:   Taty has a current medication list which includes the following prescription(s): alprazolam, aspirin, doxycycline, fluoxetine, gabapentin, hydrocodone-acetaminophen, ondansetron, propranolol, and quetiapine, and the following Facility-Administered Medications: sodium chloride 0.9% and lidocaine hcl 10 mg/ml (1%).    Allergies:   Review of patient's allergies indicates:   Allergen Reactions    Augmentin [amoxicillin-pot clavulanate] Other (See Comments)     Stomach cramps        Imaging, Xrays: see imaging report     Prior Therapy: No  Social History:  lives with their family  Occupation: Does not work as of today. Patients goal is to return to work if possible  Prior Level of Function: Cook, jogging, going to the gym  Current Level of Function: difficulty with all activities mentioned above. Patient is weight bearing as tolerated and takes a long time to complete any task.     Pain:  Current 0/10, worst 8/10, best 0/10   Location: right foot   Description: Numb and Sharp  Aggravating Factors: Standing and Walking  Easing Factors: ice and rest    Pts goals: Return to walking, cooking, going to the gym    Objective         Ankle Right Right Right Left Left Left Pain/Dysfunction with Movement    AROM PROM MMT AROM PROM MMT    Plantarflexion NT NT NT 30 NT NT    Dorsiflexion NT NT NT -5 NT NT    Inversion NT NT NT 30 NT NT    Eversion NT NT NT  20 NT NT            CMS Impairment/Limitation/Restriction for FOTO Ankle Survey    Therapist reviewed FOTO scores for Taty Kenney on 12/15/2020.   FOTO documents entered into [employer] - see Media section.    Limitation Score: 60%  Category: Mobility             TREATMENT       Home Exercises and Patient Education Provided    Education provided:   - Purpose of PT    Assessment   Taty is a 60 y.o. female referred to outpatient Physical Therapy with a medical diagnosis of post operative state of close trimalleolar fracture of L ankle. Pt presents with increased pain and decreased range of motion of L ankle. Patients incision looked good without any signs of redness or swelling. Patient was not assessed for LE strength today due to lack of time. Strength will be assessed next visit.     Pt prognosis is Good.   Pt will benefit from skilled outpatient Physical Therapy to address the deficits stated above and in the chart below, provide pt/family education, and to maximize pt's level of independence.     Plan of care discussed with patient: Yes  Pt's spiritual, cultural and educational needs considered and patient is agreeable to the plan of care and goals as stated below:     Anticipated Barriers for therapy: None    Medical Necessity is demonstrated by the following  History  Co-morbidities and personal factors that may impact the plan of care Co-morbidities:   anxiety, depression and HTN    Personal Factors:   no deficits     high   Examination  Body Structures and Functions, activity limitations and participation restrictions that may impact the plan of care Body Regions:   lower extremities    Body Systems:    ROM  strength  balance    Participation Restrictions:   None    Activity limitations:   Learning and applying knowledge  no deficits    General Tasks and Commands  no deficits    Communication  no deficits    Mobility  walking    Self care  no deficits    Domestic Life  no  deficits    Interactions/Relationships  no deficits    Life Areas  no deficits    Community and Social Life  recreation and leisure         high   Clinical Presentation stable and uncomplicated low   Decision Making/ Complexity Score: low     Goals:  Short Term Goals (2 Weeks):   1. Pt will be compliant with HEP to supplement PT in restoring pain free function.      Long Term Goals (6 Weeks):  1. Pt will improve FOTO score to </= 45% limited to decrease perceived limitation with mobility  2. Pt will decrease L ankle pain to 0/10 with all ankle ROM and MMT   3. Pt will improve impaired LE MMTs by ___ grade to improve strength for functional tasks.  4. Pt improve impaired ankle ROM to equal opposite side in all planes to improve mobility for normal movement patterns.       Plan   Plan of care Certification: 12/15/2020 to 2/26/2021.    Outpatient Physical Therapy 2 times weekly for 10 weeks to include the following interventions: Gait Training, Manual Therapy, Moist Heat/ Ice, Neuromuscular Re-ed, Patient Education, Self Care, Therapeutic Activites and Therapeutic Exercise.     Kwaku Bautista, PT

## 2021-01-08 ENCOUNTER — TELEPHONE (OUTPATIENT)
Dept: ORTHOPEDICS | Facility: CLINIC | Age: 61
End: 2021-01-08

## 2021-01-11 ENCOUNTER — TELEPHONE (OUTPATIENT)
Dept: ORTHOPEDICS | Facility: CLINIC | Age: 61
End: 2021-01-11

## 2021-03-16 ENCOUNTER — TELEPHONE (OUTPATIENT)
Dept: ORTHOPEDICS | Facility: CLINIC | Age: 61
End: 2021-03-16

## 2021-03-22 ENCOUNTER — OFFICE VISIT (OUTPATIENT)
Dept: ORTHOPEDICS | Facility: CLINIC | Age: 61
End: 2021-03-22
Payer: MEDICAID

## 2021-03-22 VITALS — DIASTOLIC BLOOD PRESSURE: 102 MMHG | SYSTOLIC BLOOD PRESSURE: 152 MMHG | HEART RATE: 76 BPM

## 2021-03-22 DIAGNOSIS — T84.84XA PAINFUL ORTHOPAEDIC HARDWARE: ICD-10-CM

## 2021-03-22 DIAGNOSIS — R27.0 ATAXIA: ICD-10-CM

## 2021-03-22 DIAGNOSIS — T81.42XA DEEP POSTOPERATIVE WOUND INFECTION: ICD-10-CM

## 2021-03-22 DIAGNOSIS — S82.852D CLOSED TRIMALLEOLAR FRACTURE OF LEFT ANKLE WITH ROUTINE HEALING, SUBSEQUENT ENCOUNTER: Primary | ICD-10-CM

## 2021-03-22 PROCEDURE — 99214 OFFICE O/P EST MOD 30 MIN: CPT | Mod: S$PBB,,, | Performed by: ORTHOPAEDIC SURGERY

## 2021-03-22 PROCEDURE — 99214 OFFICE O/P EST MOD 30 MIN: CPT | Mod: PBBFAC,PN | Performed by: ORTHOPAEDIC SURGERY

## 2021-03-22 PROCEDURE — 99999 PR PBB SHADOW E&M-EST. PATIENT-LVL IV: CPT | Mod: PBBFAC,,, | Performed by: ORTHOPAEDIC SURGERY

## 2021-03-22 PROCEDURE — 99999 PR PBB SHADOW E&M-EST. PATIENT-LVL IV: ICD-10-PCS | Mod: PBBFAC,,, | Performed by: ORTHOPAEDIC SURGERY

## 2021-03-22 PROCEDURE — 99214 PR OFFICE/OUTPT VISIT, EST, LEVL IV, 30-39 MIN: ICD-10-PCS | Mod: S$PBB,,, | Performed by: ORTHOPAEDIC SURGERY

## 2021-03-22 RX ORDER — ALPRAZOLAM 1 MG/1
1 TABLET ORAL DAILY PRN
Status: ON HOLD | COMMUNITY
Start: 2021-03-09 | End: 2024-03-04

## 2021-03-24 ENCOUNTER — TELEPHONE (OUTPATIENT)
Dept: ADMINISTRATIVE | Facility: OTHER | Age: 61
End: 2021-03-24

## 2021-06-07 ENCOUNTER — TELEPHONE (OUTPATIENT)
Dept: ORTHOPEDICS | Facility: CLINIC | Age: 61
End: 2021-06-07

## 2021-06-10 ENCOUNTER — CLINICAL SUPPORT (OUTPATIENT)
Dept: REHABILITATION | Facility: HOSPITAL | Age: 61
End: 2021-06-10
Payer: MEDICAID

## 2021-06-10 DIAGNOSIS — M25.672 DECREASED RANGE OF MOTION OF LEFT ANKLE: Primary | ICD-10-CM

## 2021-06-10 DIAGNOSIS — G89.29 CHRONIC PAIN OF LEFT ANKLE: ICD-10-CM

## 2021-06-10 DIAGNOSIS — R27.0 ATAXIA: ICD-10-CM

## 2021-06-10 DIAGNOSIS — R29.898 ANKLE WEAKNESS: ICD-10-CM

## 2021-06-10 DIAGNOSIS — M25.572 CHRONIC PAIN OF LEFT ANKLE: ICD-10-CM

## 2021-06-10 DIAGNOSIS — S82.852D CLOSED TRIMALLEOLAR FRACTURE OF LEFT ANKLE WITH ROUTINE HEALING, SUBSEQUENT ENCOUNTER: ICD-10-CM

## 2021-06-10 PROCEDURE — 97161 PT EVAL LOW COMPLEX 20 MIN: CPT

## 2021-06-17 ENCOUNTER — CLINICAL SUPPORT (OUTPATIENT)
Dept: REHABILITATION | Facility: HOSPITAL | Age: 61
End: 2021-06-17
Payer: MEDICAID

## 2021-06-17 DIAGNOSIS — M25.672 DECREASED RANGE OF MOTION OF LEFT ANKLE: Primary | ICD-10-CM

## 2021-06-17 DIAGNOSIS — G89.29 CHRONIC PAIN OF LEFT ANKLE: ICD-10-CM

## 2021-06-17 DIAGNOSIS — M25.572 CHRONIC PAIN OF LEFT ANKLE: ICD-10-CM

## 2021-06-17 DIAGNOSIS — R29.898 ANKLE WEAKNESS: ICD-10-CM

## 2021-06-17 PROCEDURE — 97110 THERAPEUTIC EXERCISES: CPT | Mod: CQ

## 2021-06-22 ENCOUNTER — OFFICE VISIT (OUTPATIENT)
Dept: NEUROLOGY | Facility: CLINIC | Age: 61
End: 2021-06-22
Payer: MEDICAID

## 2021-06-22 VITALS
DIASTOLIC BLOOD PRESSURE: 82 MMHG | HEART RATE: 91 BPM | WEIGHT: 208.31 LBS | SYSTOLIC BLOOD PRESSURE: 129 MMHG | HEIGHT: 67 IN | BODY MASS INDEX: 32.7 KG/M2

## 2021-06-22 DIAGNOSIS — G35 MULTIPLE SCLEROSIS: Primary | ICD-10-CM

## 2021-06-22 DIAGNOSIS — G95.9 MYELOPATHY: ICD-10-CM

## 2021-06-22 DIAGNOSIS — R27.0 ATAXIA: ICD-10-CM

## 2021-06-22 PROCEDURE — 99205 PR OFFICE/OUTPT VISIT, NEW, LEVL V, 60-74 MIN: ICD-10-PCS | Mod: S$PBB,,, | Performed by: PSYCHIATRY & NEUROLOGY

## 2021-06-22 PROCEDURE — 99999 PR PBB SHADOW E&M-EST. PATIENT-LVL III: CPT | Mod: PBBFAC,,, | Performed by: PSYCHIATRY & NEUROLOGY

## 2021-06-22 PROCEDURE — 99999 PR PBB SHADOW E&M-EST. PATIENT-LVL III: ICD-10-PCS | Mod: PBBFAC,,, | Performed by: PSYCHIATRY & NEUROLOGY

## 2021-06-22 PROCEDURE — 99205 OFFICE O/P NEW HI 60 MIN: CPT | Mod: S$PBB,,, | Performed by: PSYCHIATRY & NEUROLOGY

## 2021-06-22 PROCEDURE — 99213 OFFICE O/P EST LOW 20 MIN: CPT | Mod: PBBFAC,PN | Performed by: PSYCHIATRY & NEUROLOGY

## 2021-06-22 RX ORDER — HYDROCHLOROTHIAZIDE 25 MG/1
25 TABLET ORAL DAILY
Status: ON HOLD | COMMUNITY
Start: 2021-06-04 | End: 2024-03-04

## 2021-06-22 RX ORDER — QUETIAPINE FUMARATE 400 MG/1
TABLET, FILM COATED ORAL
COMMUNITY
Start: 2021-06-14

## 2021-06-23 ENCOUNTER — CLINICAL SUPPORT (OUTPATIENT)
Dept: REHABILITATION | Facility: HOSPITAL | Age: 61
End: 2021-06-23
Payer: MEDICAID

## 2021-06-23 DIAGNOSIS — M25.572 CHRONIC PAIN OF LEFT ANKLE: ICD-10-CM

## 2021-06-23 DIAGNOSIS — M25.672 DECREASED RANGE OF MOTION OF LEFT ANKLE: Primary | ICD-10-CM

## 2021-06-23 DIAGNOSIS — R29.898 ANKLE WEAKNESS: ICD-10-CM

## 2021-06-23 DIAGNOSIS — G89.29 CHRONIC PAIN OF LEFT ANKLE: ICD-10-CM

## 2021-06-23 PROCEDURE — 97110 THERAPEUTIC EXERCISES: CPT

## 2021-06-24 ENCOUNTER — DOCUMENTATION ONLY (OUTPATIENT)
Dept: REHABILITATION | Facility: HOSPITAL | Age: 61
End: 2021-06-24

## 2021-07-08 ENCOUNTER — HOSPITAL ENCOUNTER (OUTPATIENT)
Dept: RADIOLOGY | Facility: HOSPITAL | Age: 61
Discharge: HOME | End: 2021-07-08
Attending: PSYCHIATRY & NEUROLOGY
Payer: MEDICAID

## 2021-07-08 DIAGNOSIS — G35 MULTIPLE SCLEROSIS: ICD-10-CM

## 2021-07-08 PROCEDURE — 70553 MRI BRAIN DEMYELINATING W/ WO CONTRAST: ICD-10-PCS | Mod: 26,,, | Performed by: RADIOLOGY

## 2021-07-08 PROCEDURE — 72156 MRI NECK SPINE W/O & W/DYE: CPT | Mod: TC

## 2021-07-08 PROCEDURE — 72157 MRI CHEST SPINE W/O & W/DYE: CPT | Mod: TC

## 2021-07-08 PROCEDURE — A9585 GADOBUTROL INJECTION: HCPCS | Performed by: PSYCHIATRY & NEUROLOGY

## 2021-07-08 PROCEDURE — 70553 MRI BRAIN STEM W/O & W/DYE: CPT | Mod: TC

## 2021-07-08 PROCEDURE — 25500020 PHARM REV CODE 255: Performed by: PSYCHIATRY & NEUROLOGY

## 2021-07-08 PROCEDURE — 72157 MRI THORACIC SPINE DEMYELINATING W W/O CONTRAST: ICD-10-PCS | Mod: 26,,, | Performed by: RADIOLOGY

## 2021-07-08 PROCEDURE — 72156 MRI CERVICAL SPINE DEMYELINATING W W/O CONTRAST: ICD-10-PCS | Mod: 26,,, | Performed by: RADIOLOGY

## 2021-07-08 PROCEDURE — 72156 MRI NECK SPINE W/O & W/DYE: CPT | Mod: 26,,, | Performed by: RADIOLOGY

## 2021-07-08 PROCEDURE — 70553 MRI BRAIN STEM W/O & W/DYE: CPT | Mod: 26,,, | Performed by: RADIOLOGY

## 2021-07-08 PROCEDURE — 72157 MRI CHEST SPINE W/O & W/DYE: CPT | Mod: 26,,, | Performed by: RADIOLOGY

## 2021-07-08 RX ORDER — GADOBUTROL 604.72 MG/ML
8 INJECTION INTRAVENOUS
Status: COMPLETED | OUTPATIENT
Start: 2021-07-08 | End: 2021-07-08

## 2021-07-08 RX ADMIN — GADOBUTROL 8 ML: 604.72 INJECTION INTRAVENOUS at 02:07

## 2021-07-15 ENCOUNTER — TELEPHONE (OUTPATIENT)
Dept: NEUROLOGY | Facility: CLINIC | Age: 61
End: 2021-07-15

## 2021-07-28 ENCOUNTER — TELEPHONE (OUTPATIENT)
Dept: ORTHOPEDICS | Facility: CLINIC | Age: 61
End: 2021-07-28

## 2021-08-03 ENCOUNTER — TELEPHONE (OUTPATIENT)
Dept: ORTHOPEDICS | Facility: CLINIC | Age: 61
End: 2021-08-03

## 2021-08-05 ENCOUNTER — OFFICE VISIT (OUTPATIENT)
Dept: ORTHOPEDICS | Facility: CLINIC | Age: 61
End: 2021-08-05
Payer: MEDICAID

## 2021-08-05 VITALS
WEIGHT: 208 LBS | HEIGHT: 67 IN | DIASTOLIC BLOOD PRESSURE: 87 MMHG | SYSTOLIC BLOOD PRESSURE: 141 MMHG | BODY MASS INDEX: 32.65 KG/M2

## 2021-08-05 DIAGNOSIS — R27.0 ATAXIA: ICD-10-CM

## 2021-08-05 DIAGNOSIS — R20.2 PARESTHESIA OF LEFT LEG: Primary | ICD-10-CM

## 2021-08-05 DIAGNOSIS — T81.42XA DEEP POSTOPERATIVE WOUND INFECTION: ICD-10-CM

## 2021-08-05 PROCEDURE — 99999 PR PBB SHADOW E&M-EST. PATIENT-LVL III: CPT | Mod: PBBFAC,,, | Performed by: ORTHOPAEDIC SURGERY

## 2021-08-05 PROCEDURE — 99999 PR PBB SHADOW E&M-EST. PATIENT-LVL III: ICD-10-PCS | Mod: PBBFAC,,, | Performed by: ORTHOPAEDIC SURGERY

## 2021-08-05 PROCEDURE — 99213 OFFICE O/P EST LOW 20 MIN: CPT | Mod: S$PBB,,, | Performed by: ORTHOPAEDIC SURGERY

## 2021-08-05 PROCEDURE — 99213 PR OFFICE/OUTPT VISIT, EST, LEVL III, 20-29 MIN: ICD-10-PCS | Mod: S$PBB,,, | Performed by: ORTHOPAEDIC SURGERY

## 2021-08-05 PROCEDURE — 99213 OFFICE O/P EST LOW 20 MIN: CPT | Mod: PBBFAC,PN | Performed by: ORTHOPAEDIC SURGERY

## 2021-08-11 ENCOUNTER — TELEPHONE (OUTPATIENT)
Dept: ORTHOPEDICS | Facility: CLINIC | Age: 61
End: 2021-08-11

## 2021-08-13 ENCOUNTER — TELEPHONE (OUTPATIENT)
Dept: ORTHOPEDICS | Facility: CLINIC | Age: 61
End: 2021-08-13

## 2021-08-17 ENCOUNTER — OFFICE VISIT (OUTPATIENT)
Dept: NEUROLOGY | Facility: CLINIC | Age: 61
End: 2021-08-17
Payer: MEDICAID

## 2021-08-17 VITALS
SYSTOLIC BLOOD PRESSURE: 126 MMHG | HEIGHT: 67 IN | BODY MASS INDEX: 32.65 KG/M2 | HEART RATE: 81 BPM | DIASTOLIC BLOOD PRESSURE: 84 MMHG | WEIGHT: 208 LBS

## 2021-08-17 DIAGNOSIS — M47.812 NECK ARTHRITIS: ICD-10-CM

## 2021-08-17 DIAGNOSIS — R27.0 ATAXIA: Primary | ICD-10-CM

## 2021-08-17 PROCEDURE — 99999 PR PBB SHADOW E&M-EST. PATIENT-LVL III: ICD-10-PCS | Mod: PBBFAC,,, | Performed by: PSYCHIATRY & NEUROLOGY

## 2021-08-17 PROCEDURE — 99214 PR OFFICE/OUTPT VISIT, EST, LEVL IV, 30-39 MIN: ICD-10-PCS | Mod: S$PBB,,, | Performed by: PSYCHIATRY & NEUROLOGY

## 2021-08-17 PROCEDURE — 99999 PR PBB SHADOW E&M-EST. PATIENT-LVL III: CPT | Mod: PBBFAC,,, | Performed by: PSYCHIATRY & NEUROLOGY

## 2021-08-17 PROCEDURE — 99213 OFFICE O/P EST LOW 20 MIN: CPT | Mod: PBBFAC,PN | Performed by: PSYCHIATRY & NEUROLOGY

## 2021-08-17 PROCEDURE — 99214 OFFICE O/P EST MOD 30 MIN: CPT | Mod: S$PBB,,, | Performed by: PSYCHIATRY & NEUROLOGY

## 2021-08-17 RX ORDER — TIZANIDINE 4 MG/1
TABLET ORAL
Qty: 60 TABLET | Refills: 3 | Status: SHIPPED | OUTPATIENT
Start: 2021-08-17 | End: 2022-05-19 | Stop reason: SDUPTHER

## 2021-08-19 ENCOUNTER — TELEPHONE (OUTPATIENT)
Dept: NEUROLOGY | Facility: CLINIC | Age: 61
End: 2021-08-19

## 2021-08-25 ENCOUNTER — TELEPHONE (OUTPATIENT)
Dept: ORTHOPEDICS | Facility: CLINIC | Age: 61
End: 2021-08-25

## 2021-08-25 DIAGNOSIS — S82.852D CLOSED TRIMALLEOLAR FRACTURE OF LEFT ANKLE WITH ROUTINE HEALING, SUBSEQUENT ENCOUNTER: Primary | ICD-10-CM

## 2021-08-26 ENCOUNTER — TELEPHONE (OUTPATIENT)
Dept: ORTHOPEDICS | Facility: CLINIC | Age: 61
End: 2021-08-26

## 2022-04-26 ENCOUNTER — TELEPHONE (OUTPATIENT)
Dept: NEUROLOGY | Facility: CLINIC | Age: 62
End: 2022-04-26
Payer: MEDICAID

## 2022-04-26 ENCOUNTER — TELEPHONE (OUTPATIENT)
Dept: ORTHOPEDICS | Facility: CLINIC | Age: 62
End: 2022-04-26
Payer: MEDICAID

## 2022-04-26 NOTE — TELEPHONE ENCOUNTER
----- Message from Lay Sahni sent at 4/26/2022  2:10 PM CDT -----  Contact: [phone]  Type:  Needs Medical Advice    Who Called: Pt  Reason for Call: regarding having some clinic notes from the pt's last 3 visit fax over to Dr Carpio for Pain Management for the pt to have her Injections. Please fax to [phone] attention My   Would the patient rather a call back or a response via [facility]?  Call back  Best Call Back Number: [phone]

## 2022-04-26 NOTE — TELEPHONE ENCOUNTER
----- Message from Lay Sahni sent at 4/26/2022  2:05 PM CDT -----  Contact: [phone]  Type:  Needs Medical Advice    Who Called: Pt  Reason for Call: regarding having some clinic notes from the pt's last 3 visit fax over to Dr Carpio for Pain Management for the pt to have her Injections. Please fax to [phone] attention My   Would the patient rather a call back or a response via [facility]?  Call back  Best Call Back Number: [phone]

## 2022-05-19 DIAGNOSIS — G35 MULTIPLE SCLEROSIS: Primary | ICD-10-CM

## 2022-05-19 RX ORDER — TIZANIDINE 4 MG/1
TABLET ORAL
Qty: 60 TABLET | Refills: 3 | Status: SHIPPED | OUTPATIENT
Start: 2022-05-19 | End: 2023-12-20

## 2022-08-12 ENCOUNTER — TELEPHONE (OUTPATIENT)
Dept: NEUROLOGY | Facility: CLINIC | Age: 62
End: 2022-08-12
Payer: MEDICAID

## 2022-08-12 ENCOUNTER — TELEPHONE (OUTPATIENT)
Dept: ORTHOPEDICS | Facility: CLINIC | Age: 62
End: 2022-08-12
Payer: MEDICAID

## 2022-08-12 NOTE — TELEPHONE ENCOUNTER
----- Message from Shalini Beauchamp sent at 8/12/2022 10:04 AM CDT -----  Type:  Same Day Appointment Request    Caller is requesting a same day appointment.  Caller declined first available appointment listed below.    Name of Caller: Pt   When is the first available appointment? 10/19  Symptoms: ankle issuers   Best Call Back Number: [phone]  Additional Information:

## 2022-08-12 NOTE — TELEPHONE ENCOUNTER
----- Message from Shalini Beauchamp sent at 8/12/2022 10:07 AM CDT -----  Type:  Same Day Appointment Request    Caller is requesting a same day appointment.  Caller declined first available appointment listed below.    Name of Caller: Pt   When is the first available appointment? N/a   Symptoms: f/u MS   Best Call Back Number: [phone]  Additional Information:

## 2022-09-15 DIAGNOSIS — G35 MULTIPLE SCLEROSIS: ICD-10-CM

## 2022-09-15 RX ORDER — TIZANIDINE 4 MG/1
TABLET ORAL
Qty: 60 TABLET | Refills: 3 | OUTPATIENT
Start: 2022-09-15

## 2022-11-21 ENCOUNTER — TELEPHONE (OUTPATIENT)
Dept: ORTHOPEDICS | Facility: CLINIC | Age: 62
End: 2022-11-21
Payer: MEDICAID

## 2022-11-21 DIAGNOSIS — R52 PAIN: Primary | ICD-10-CM

## 2022-11-21 NOTE — TELEPHONE ENCOUNTER
----- Message from May Fernández sent at 11/21/2022 11:32 AM CST -----  Type:  Needs Medical Advice    Who Called: pt   Symptoms (please be specific): pt is following up on a sooner appointment for pain with left foot /knee pain     Would the patient rather a call back or a response via [facility]? call  Best Call Back Number:[phone]   Additional Information:

## 2022-12-01 ENCOUNTER — TELEPHONE (OUTPATIENT)
Dept: ORTHOPEDICS | Facility: CLINIC | Age: 62
End: 2022-12-01
Payer: MEDICAID

## 2022-12-01 ENCOUNTER — OFFICE VISIT (OUTPATIENT)
Dept: ORTHOPEDICS | Facility: CLINIC | Age: 62
End: 2022-12-01
Payer: MEDICAID

## 2022-12-01 ENCOUNTER — HOSPITAL ENCOUNTER (OUTPATIENT)
Dept: RADIOLOGY | Facility: HOSPITAL | Age: 62
Discharge: HOME | End: 2022-12-01
Attending: PHYSICIAN ASSISTANT
Payer: MEDICAID

## 2022-12-01 DIAGNOSIS — S93.622A LISFRANC'S SPRAIN, LEFT, INITIAL ENCOUNTER: Primary | ICD-10-CM

## 2022-12-01 DIAGNOSIS — S99.922A INJURY OF LEFT FOOT, INITIAL ENCOUNTER: ICD-10-CM

## 2022-12-01 DIAGNOSIS — R52 PAIN: ICD-10-CM

## 2022-12-01 DIAGNOSIS — R52 PAIN: Primary | ICD-10-CM

## 2022-12-01 PROCEDURE — 73630 X-RAY EXAM OF FOOT: CPT | Mod: 26,LT,, | Performed by: RADIOLOGY

## 2022-12-01 PROCEDURE — 73630 X-RAY EXAM OF FOOT: CPT | Mod: TC,FY,LT

## 2022-12-01 PROCEDURE — 1159F MED LIST DOCD IN RCRD: CPT | Mod: CPTII,,, | Performed by: PHYSICIAN ASSISTANT

## 2022-12-01 PROCEDURE — 99213 OFFICE O/P EST LOW 20 MIN: CPT | Mod: S$PBB,,, | Performed by: PHYSICIAN ASSISTANT

## 2022-12-01 PROCEDURE — 73630 XR FOOT COMPLETE 3 VIEW LEFT: ICD-10-PCS | Mod: 26,LT,, | Performed by: RADIOLOGY

## 2022-12-01 PROCEDURE — 1160F RVW MEDS BY RX/DR IN RCRD: CPT | Mod: CPTII,,, | Performed by: PHYSICIAN ASSISTANT

## 2022-12-01 PROCEDURE — 1159F PR MEDICATION LIST DOCUMENTED IN MEDICAL RECORD: ICD-10-PCS | Mod: CPTII,,, | Performed by: PHYSICIAN ASSISTANT

## 2022-12-01 PROCEDURE — 1160F PR REVIEW ALL MEDS BY PRESCRIBER/CLIN PHARMACIST DOCUMENTED: ICD-10-PCS | Mod: CPTII,,, | Performed by: PHYSICIAN ASSISTANT

## 2022-12-01 PROCEDURE — 99213 PR OFFICE/OUTPT VISIT, EST, LEVL III, 20-29 MIN: ICD-10-PCS | Mod: S$PBB,,, | Performed by: PHYSICIAN ASSISTANT

## 2022-12-01 PROCEDURE — 99999 PR PBB SHADOW E&M-EST. PATIENT-LVL II: ICD-10-PCS | Mod: PBBFAC,,, | Performed by: PHYSICIAN ASSISTANT

## 2022-12-01 PROCEDURE — 99212 OFFICE O/P EST SF 10 MIN: CPT | Mod: PBBFAC,PN | Performed by: PHYSICIAN ASSISTANT

## 2022-12-01 PROCEDURE — 99999 PR PBB SHADOW E&M-EST. PATIENT-LVL II: CPT | Mod: PBBFAC,,, | Performed by: PHYSICIAN ASSISTANT

## 2022-12-01 NOTE — PROGRESS NOTES
Subjective:      Patient ID: Taty Kenney is a 62 y.o. female.    Chief Complaint: No chief complaint on file.      62-year-old female presents with 4 week history of left foot pain.  She states she tripped over her elliptical machine at home.  She has pain superior foot.  She notes continued swelling.  She is been walking in a tall Cam walker boot since.  She states she has nerve damage in her foot from previous left ankle surgery.  She reports chronic numbness and paresthesias in her foot due to that.      Review of Systems   Constitutional: Negative for chills and fever.   Cardiovascular:  Negative for chest pain.   Respiratory:  Negative for cough.    Hematologic/Lymphatic: Does not bruise/bleed easily.   Skin:  Negative for poor wound healing and rash.   Musculoskeletal:  Positive for joint pain, joint swelling, myalgias and stiffness.   Gastrointestinal:  Negative for abdominal pain.   Genitourinary:  Negative for bladder incontinence.   Neurological:  Negative for dizziness, loss of balance and weakness.   Psychiatric/Behavioral:  Negative for altered mental status.      Review of patient's allergies indicates:   Allergen Reactions    Augmentin [amoxicillin-pot clavulanate] Other (See Comments)     Stomach cramps        Current Outpatient Medications   Medication Sig Dispense Refill    ALPRAZolam (XANAX) 1 MG tablet Take 1 mg by mouth daily as needed.      aspirin (ECOTRIN) 81 MG EC tablet Take 1 tablet (81 mg total) by mouth once daily. 30 tablet 0    hydroCHLOROthiazide (HYDRODIURIL) 25 MG tablet Take 25 mg by mouth once daily.      ondansetron (ZOFRAN-ODT) 4 MG TbDL Take 2 tablets (8 mg total) by mouth every 8 (eight) hours as needed (nausea). 15 tablet 0    propranoloL (INDERAL) 40 MG tablet Take 20 mg by mouth 2 (two) times daily.       QUEtiapine (SEROQUEL) 400 MG tablet       tiZANidine (ZANAFLEX) 4 MG tablet 1-2 po qhs 60 tablet 3     No current facility-administered medications for this  visit.     Facility-Administered Medications Ordered in Other Visits   Medication Dose Route Frequency Provider Last Rate Last Admin    0.9%  NaCl infusion   Intravenous Continuous Triston Naidu IV, MD        lidocaine HCL 10 mg/ml (1%) injection 1 mL  1 mL Intradermal Once PRN Triston Naidu IV, MD            The patient's relevant past medical, surgical, and social history was reviewed in Epic.       Objective:      VITAL SIGNS: There were no vitals taken for this visit.    General    Nursing note and vitals reviewed.  Constitutional: She is oriented to person, place, and time. She appears well-developed and well-nourished.   Neurological: She is alert and oriented to person, place, and time.         Right Ankle/Foot Exam     Inspection   Effusion:  Foot - present    Tenderness   The patient is tender to palpation of the metatarsals.    Range of Motion   Ankle Joint   Dorsiflexion:  abnormal   Plantar flexion:  abnormal   Subtalar Joint   Inversion:  abnormal   Eversion:  abnormal     Other   Sensation: decreased    Comments:  Tenderness over the Lisfranc joint and 2nd and 3rd cuneiforms.  Global midfoot swelling remains.  Diminished sensation also noted.        Muscle Strength   Right Lower Extremity   Anterior tibial:  4/5   Posterior tibial:  4/5   Gastrocsoleus:  4/5   Peroneal muscle:  4/5      X-Ray Foot Complete 3 view Left  Narrative: EXAMINATION:  XR FOOT COMPLETE 3 VIEW LEFT    CLINICAL HISTORY:  .  Pain, unspecified    TECHNIQUE:  AP, lateral and oblique views of the left foot were performed.    COMPARISON:  No prior similar imaging available.    FINDINGS:  There is some irregularity at the medial 2nd metatarsal base with slight step-off relative to the subjacent cuneiform on weight-bearing view.  Findings which raise concern for spectrum of Lisfranc injury/fracture, though age indeterminate.  To correlate with history.  Scattered mild degenerative change at the interphalangeal joints and 1st MTP.   Remote deformity of the distal fibula.  Impression: As above.    This report was flagged in Epic as abnormal.    Electronically signed by: Vincent Piña  Date:    12/01/2022  Time:    13:22    I have reviewed the above radiograph and agree with the findings stated by the radiologist.           Assessment:       1. Lisfranc's sprain, left, initial encounter    2. Injury of left foot, initial encounter          Plan:         Diagnoses and all orders for this visit:    Lisfranc's sprain, left, initial encounter  -     MRI Foot (Midfoot) Left Without Contrast; Future    Injury of left foot, initial encounter    Exam findings and x-rays are concerning for an acute Lisfranc ligament injury.  Four weeks out.  I will order a stat MRI for further evaluation for Lisfranc tear. Will most likely have to refer her to a foot and ankle specialist.  She may continue cam walker boot nonweightbearing with crutches in the meanwhile.  Will call with MRI results once completed.     Diagnoses and plan discussed with the patient, as well as the expected course and duration of his symptoms.  All questions and concerns were addressed prior to the end of the visit.   Instructed patient to call office if they have any future questions/concerns or to schedule apt. Patient will return to see me if symptoms worsen or fail to improve    Note dictated with voice recognition software, please excuse any grammatical errors.        Nikki Dukes PA-C   12/01/2022

## 2022-12-02 ENCOUNTER — TELEPHONE (OUTPATIENT)
Dept: ORTHOPEDICS | Facility: CLINIC | Age: 62
End: 2022-12-02
Payer: MEDICAID

## 2022-12-02 DIAGNOSIS — S99.922A INJURY OF LEFT FOOT, INITIAL ENCOUNTER: Primary | ICD-10-CM

## 2022-12-02 RX ORDER — NAPROXEN 500 MG/1
500 TABLET ORAL 2 TIMES DAILY
Qty: 30 TABLET | Refills: 0 | Status: SHIPPED | OUTPATIENT
Start: 2022-12-02 | End: 2023-12-20

## 2022-12-02 NOTE — TELEPHONE ENCOUNTER
----- Message from Deanna Barbour MA sent at 12/1/2022  4:06 PM CST -----  Contact: pt    ----- Message -----  From: Israel Boucher  Sent: 12/1/2022   3:52 PM CST  To: Ernst Jordan Staff    .Type:  Needs Medical Advice    Who Called: pt    Pharmacy name and phone #:  [id number] [place] [address] [address] AT [facility] [address]   Phone: [phone]  Fax:  [phone]  Would the patient rather a call back or a response via [facility]? Call back  Best Call Back Number: [phone]  Additional Information: pt. Is requesting something be called in for pain or muscle relaxer.

## 2022-12-02 NOTE — TELEPHONE ENCOUNTER
----- Message from Nikki Dukes PA-C sent at 12/2/2022  9:43 AM CST -----   I Sent in something to her pharmacy. Muscular relaxer is not recommended for this injury.   ----- Message -----  From: Nasrin Roger MA  Sent: 12/2/2022   9:29 AM CST  To: Nikki Dukes PA-C      ----- Message -----  From: May Fernández  Sent: 12/2/2022   8:59 AM CST  To: Ernst Jordan Staff    Type:  Needs Medical Advice    Who Called: pt  Symptoms (please be specific): pt is requesting to have something for pain sent to the pharmacy and a muscle relaxer as well      Pharmacy name and phone #:  [id number] - [place], [place] - [address] [facility] [address]  [facility] [address] [facility] [address]  Phone: [phone] Fax: [phone]      Would the patient rather a call back or a response via [facility]? call  Best Call Back Number: [phone]  Additional Information:

## 2022-12-06 ENCOUNTER — TELEPHONE (OUTPATIENT)
Dept: ORTHOPEDICS | Facility: CLINIC | Age: 62
End: 2022-12-06
Payer: MEDICAID

## 2022-12-06 ENCOUNTER — HOSPITAL ENCOUNTER (OUTPATIENT)
Dept: RADIOLOGY | Facility: HOSPITAL | Age: 62
Discharge: HOME | End: 2022-12-06
Attending: PHYSICIAN ASSISTANT
Payer: MEDICAID

## 2022-12-06 DIAGNOSIS — S93.622A LISFRANC'S SPRAIN, LEFT, INITIAL ENCOUNTER: ICD-10-CM

## 2022-12-06 PROCEDURE — 73718 MRI FOOT (MIDFOOT) LEFT WITHOUT CONTRAST: ICD-10-PCS | Mod: 26,LT,, | Performed by: RADIOLOGY

## 2022-12-06 PROCEDURE — 73718 MRI LOWER EXTREMITY W/O DYE: CPT | Mod: 26,LT,, | Performed by: RADIOLOGY

## 2022-12-06 PROCEDURE — 73718 MRI LOWER EXTREMITY W/O DYE: CPT | Mod: TC,LT

## 2022-12-06 NOTE — TELEPHONE ENCOUNTER
----- Message from Anais Reyes, Patient Care Assistant sent at 12/6/2022  8:59 AM CST -----  Type:  Needs Medical Advice    Who Called:  pt  Symptoms (please be specific):  Patient would like a call back regarding getting an order to have a MRI of the left knee    Would the patient rather a call back or a response via [facility]?  Call   Best Call Back Number:  [phone]  Additional Information:

## 2022-12-07 ENCOUNTER — TELEPHONE (OUTPATIENT)
Dept: ORTHOPEDICS | Facility: CLINIC | Age: 62
End: 2022-12-07
Payer: MEDICAID

## 2022-12-07 DIAGNOSIS — S99.922A INJURY OF LEFT FOOT, INITIAL ENCOUNTER: Primary | ICD-10-CM

## 2022-12-07 DIAGNOSIS — S93.622A LISFRANC'S SPRAIN, LEFT, INITIAL ENCOUNTER: ICD-10-CM

## 2022-12-07 NOTE — TELEPHONE ENCOUNTER
----- Message from Israel Boucher sent at 12/7/2022  1:00 PM CST -----  Contact: pt  .Type:  Needs Medical Advice    Who Called: pt    Would the patient rather a call back or a response via [facility]? Call back  Best Call Back Number: [phone]  Additional Information: Pt. Is requesting a call back from the provider.        normal (ped)...

## 2022-12-07 NOTE — TELEPHONE ENCOUNTER
Spoke with patient regarding MRI results. Reveals MT fractures and lisfranc injury. Would like her patient to see stat podiatry referral. Order put in earlier today. She should get a phone call regarding this appt shortly.

## 2022-12-07 NOTE — TELEPHONE ENCOUNTER
Left VM regarding MRI results of the foot. Khurram place a stat order in to podiatry regarding lisfranc injury.

## 2022-12-12 ENCOUNTER — TELEPHONE (OUTPATIENT)
Dept: ORTHOPEDICS | Facility: CLINIC | Age: 62
End: 2022-12-12
Payer: MEDICAID

## 2022-12-12 NOTE — TELEPHONE ENCOUNTER
----- Message from May Fernández sent at 12/12/2022  9:13 AM CST -----  Type:  Needs Medical Advice    Who Called: pt  Symptoms (please be specific): pt is requesting a return call to discuss a referral      Would the patient rather a call back or a response via [facility]? call  Best Call Back Number: [phone]  Additional Information:

## 2022-12-15 ENCOUNTER — TELEPHONE (OUTPATIENT)
Dept: PODIATRY | Facility: CLINIC | Age: 62
End: 2022-12-15
Payer: MEDICAID

## 2022-12-15 NOTE — TELEPHONE ENCOUNTER
Left voice message for pt to give the office a call back at [phone].Called to help pt get scheduled for the next aviailable appointment with  Dr. Augustine.

## 2022-12-16 ENCOUNTER — TELEPHONE (OUTPATIENT)
Dept: PODIATRY | Facility: CLINIC | Age: 62
End: 2022-12-16
Payer: MEDICAID

## 2022-12-16 NOTE — TELEPHONE ENCOUNTER
----- Message from Edgar Chandler DPM sent at 12/16/2022  4:55 PM CST -----  Regarding: RE: Urgent Appointment  Due to the limited medicaid spots lets try to get the patient with Dr. Augustine for now or if we get a cancellation.    Max.  ----- Message -----  From: Gale Flood MA  Sent: 12/16/2022   2:31 PM CST  To: Edgar Chandler DPM, Radames Saldana MA  Subject: RE: Urgent Appointment                           Paula Crum,  Did you ask Dr Chandler?      ----- Message -----  From: Radames Saldana MA  Sent: 12/16/2022   2:02 PM CST  To: Gale Flood MA  Subject: FW: Urgent Appointment                           Dr. Chandler is completely booked out. His next available appointment is not until Jan. Pt declined being seen today at [facility]. Per her chart she never seen Dr. Chandler before. She is also a medicaid pt.  If she would like she can go to Orthopedics if they can see her sooner. But we don't have the availability with Dr. Chandler.       Thank You    Radames'   ----- Message -----  From: Gale Flood MA  Sent: 12/16/2022   1:53 PM CST  To: Geraldine Hernández Staff  Subject: Urgent Appointment                               Hi Dr Chandler,     This patient reached out to us regarding Injury to Left foot, she only wants to be seen in [place], looks like her MRI stated she had a fracture, I am not sure if you guys can see her ASAP or should I send her to Orthopedic please advise     Thank You,  Tammie Flood  Medical Assistant to Dr Gurinder Augustine M.D  [facility]

## 2022-12-16 NOTE — TELEPHONE ENCOUNTER
Pt declined an appointment today she states she will give our office a call back to help get scheduled.

## 2022-12-16 NOTE — TELEPHONE ENCOUNTER
----- Message from Tereza Mansfield sent at 12/16/2022  1:16 PM CST -----  Regarding: Appt  Pt is requesting a call back Regarding scheduling appt please Call to discuss further .  Attempt to schedule pt No availability epic .      Pt@[phone]

## 2022-12-16 NOTE — TELEPHONE ENCOUNTER
Message has been sent to Dr Chandler office,for office appt,  patient wants to be seen in [facility]

## 2022-12-16 NOTE — TELEPHONE ENCOUNTER
----- Message from Tabatha Flood sent at 12/16/2022  8:17 AM CST -----  Taty Kenney calling regarding Patient Advice for #missed call from Dan[place], call back [phone]

## 2022-12-16 NOTE — TELEPHONE ENCOUNTER
Spoke with Ms Anne Marie to assist with scheduling an appt with Dr. Augustine for Monday Dec 19 at 10 am on [address]; however, she reports she has an appt with Orthopedics and would prefer to keep that appt at this time. Did offer an appt with Dr. Chandler next week if any cancellations occur as well. Appreciative, but declined at this time. Encouraged to please call if further assistance is needed. No other needs voiced at this time

## 2022-12-19 ENCOUNTER — OFFICE VISIT (OUTPATIENT)
Dept: ORTHOPEDICS | Facility: CLINIC | Age: 62
End: 2022-12-19
Payer: MEDICAID

## 2022-12-19 DIAGNOSIS — S93.622A LISFRANC'S SPRAIN, LEFT, INITIAL ENCOUNTER: ICD-10-CM

## 2022-12-19 DIAGNOSIS — S99.922A INJURY OF LEFT FOOT, INITIAL ENCOUNTER: Primary | ICD-10-CM

## 2022-12-19 PROCEDURE — 1159F MED LIST DOCD IN RCRD: CPT | Mod: CPTII,,, | Performed by: PHYSICIAN ASSISTANT

## 2022-12-19 PROCEDURE — 99999 PR PBB SHADOW E&M-EST. PATIENT-LVL I: CPT | Mod: PBBFAC,,, | Performed by: PHYSICIAN ASSISTANT

## 2022-12-19 PROCEDURE — 1160F PR REVIEW ALL MEDS BY PRESCRIBER/CLIN PHARMACIST DOCUMENTED: ICD-10-PCS | Mod: CPTII,,, | Performed by: PHYSICIAN ASSISTANT

## 2022-12-19 PROCEDURE — 99999 PR PBB SHADOW E&M-EST. PATIENT-LVL I: ICD-10-PCS | Mod: PBBFAC,,, | Performed by: PHYSICIAN ASSISTANT

## 2022-12-19 PROCEDURE — 99211 OFF/OP EST MAY X REQ PHY/QHP: CPT | Mod: PBBFAC,PN | Performed by: PHYSICIAN ASSISTANT

## 2022-12-19 PROCEDURE — 99213 PR OFFICE/OUTPT VISIT, EST, LEVL III, 20-29 MIN: ICD-10-PCS | Mod: S$PBB,,, | Performed by: PHYSICIAN ASSISTANT

## 2022-12-19 PROCEDURE — 1159F PR MEDICATION LIST DOCUMENTED IN MEDICAL RECORD: ICD-10-PCS | Mod: CPTII,,, | Performed by: PHYSICIAN ASSISTANT

## 2022-12-19 PROCEDURE — 99213 OFFICE O/P EST LOW 20 MIN: CPT | Mod: S$PBB,,, | Performed by: PHYSICIAN ASSISTANT

## 2022-12-19 PROCEDURE — 1160F RVW MEDS BY RX/DR IN RCRD: CPT | Mod: CPTII,,, | Performed by: PHYSICIAN ASSISTANT

## 2022-12-19 NOTE — PROGRESS NOTES
Subjective:      Patient ID: Taty Kenney is a 62 y.o. female.    Chief Complaint: No chief complaint on file.      62-year-old female follow-up left foot injury.  I refer patient to Podiatry after her MRI was completed.  Not sure how she did not get into Podiatry sooner.  According to scheduling notes she was offered an appointment today at 10:00 a.m. which she declined because she would rather see me.  Patient states she was never told this.  I explained to the patient I do not see this type of foot problem.  MRI reveals a Lisfranc injury.  Patient is accompanied by her daughter who seems to be her main mode of transportation.      Review of Systems   Constitutional: Negative for chills and fever.   Cardiovascular:  Negative for chest pain.   Respiratory:  Negative for cough.    Hematologic/Lymphatic: Does not bruise/bleed easily.   Skin:  Negative for poor wound healing and rash.   Musculoskeletal:  Positive for joint pain, myalgias and stiffness.   Gastrointestinal:  Negative for abdominal pain.   Genitourinary:  Negative for bladder incontinence.   Neurological:  Negative for dizziness, loss of balance and weakness.   Psychiatric/Behavioral:  Negative for altered mental status.      Review of patient's allergies indicates:   Allergen Reactions    Augmentin [amoxicillin-pot clavulanate] Other (See Comments)     Stomach cramps        Current Outpatient Medications   Medication Sig Dispense Refill    ALPRAZolam (XANAX) 1 MG tablet Take 1 mg by mouth daily as needed.      aspirin (ECOTRIN) 81 MG EC tablet Take 1 tablet (81 mg total) by mouth once daily. 30 tablet 0    hydroCHLOROthiazide (HYDRODIURIL) 25 MG tablet Take 25 mg by mouth once daily.      naproxen (NAPROSYN) 500 MG tablet Take 1 tablet (500 mg total) by mouth 2 (two) times daily. 30 tablet 0    ondansetron (ZOFRAN-ODT) 4 MG TbDL Take 2 tablets (8 mg total) by mouth every 8 (eight) hours as needed (nausea). 15 tablet 0    propranoloL (INDERAL) 40  MG tablet Take 20 mg by mouth 2 (two) times daily.       QUEtiapine (SEROQUEL) 400 MG tablet       tiZANidine (ZANAFLEX) 4 MG tablet 1-2 po qhs 60 tablet 3     No current facility-administered medications for this visit.     Facility-Administered Medications Ordered in Other Visits   Medication Dose Route Frequency Provider Last Rate Last Admin    0.9%  NaCl infusion   Intravenous Continuous Triston Naidu IV, MD        lidocaine HCL 10 mg/ml (1%) injection 1 mL  1 mL Intradermal Once PRN Triston Naidu IV, MD            The patient's relevant past medical, surgical, and social history was reviewed in Epic.       Objective:      VITAL SIGNS: There were no vitals taken for this visit.            Left Ankle/Foot Exam     Inspection  Effusion:  Foot - present    Range of Motion   Ankle Joint  Dorsiflexion:  normal   Plantar flexion:  normal     Subtalar Joint   Inversion:  normal   Eversion:  normal     Other   Sensation: decreased    Comments:  Good ankle ROM.  Decreased sensation in the midfoot from previous surgery.            Assessment:       1. Injury of left foot, initial encounter    2. Lisfranc's sprain, left, initial encounter          Plan:         Diagnoses and all orders for this visit:    Injury of left foot, initial encounter    Lisfranc's sprain, left, initial encounter    Explained to the patient again she needs to see a podiatrist for this issue.  We will try and get her in with a another provider this week.  My office was able to schedule her to see a podiatrist in [place] later this week.  She may continue the tall Cam walker boot, nonweightbearing if able.  All further foot/ankle complaints to be deferred to podiatry.     Diagnoses and plan discussed with the patient, as well as the expected course and duration of his symptoms.  All questions and concerns were addressed prior to the end of the visit.   Instructed patient to call office if they have any future questions/concerns or to schedule apt.  Patient will return to see me if symptoms worsen or fail to improve    Note dictated with voice recognition software, please excuse any grammatical errors.        Nikki Dukes PA-C   12/19/2022

## 2022-12-20 DIAGNOSIS — M79.672 LEFT FOOT PAIN: Primary | ICD-10-CM

## 2022-12-21 ENCOUNTER — OFFICE VISIT (OUTPATIENT)
Dept: PODIATRY | Facility: CLINIC | Age: 62
End: 2022-12-21
Payer: MEDICAID

## 2022-12-21 VITALS
SYSTOLIC BLOOD PRESSURE: 149 MMHG | RESPIRATION RATE: 18 BRPM | HEIGHT: 67 IN | HEART RATE: 7 BPM | BODY MASS INDEX: 28.25 KG/M2 | DIASTOLIC BLOOD PRESSURE: 83 MMHG | WEIGHT: 180 LBS

## 2022-12-21 DIAGNOSIS — S93.324A DISLOCATION OF TARSOMETATARSAL JOINT OF RIGHT FOOT, INITIAL ENCOUNTER: Primary | ICD-10-CM

## 2022-12-21 DIAGNOSIS — R60.0 LOCALIZED EDEMA: ICD-10-CM

## 2022-12-21 DIAGNOSIS — S99.922A INJURY OF LEFT FOOT, INITIAL ENCOUNTER: ICD-10-CM

## 2022-12-21 DIAGNOSIS — S93.622A LISFRANC'S SPRAIN, LEFT, INITIAL ENCOUNTER: ICD-10-CM

## 2022-12-21 PROCEDURE — 28570 TREAT FOOT DISLOCATION: CPT | Mod: S$PBB,LT,, | Performed by: PODIATRIST

## 2022-12-21 PROCEDURE — 99999 PR PBB SHADOW E&M-EST. PATIENT-LVL V: ICD-10-PCS | Mod: PBBFAC,,, | Performed by: PODIATRIST

## 2022-12-21 PROCEDURE — 3077F SYST BP >= 140 MM HG: CPT | Mod: CPTII,,, | Performed by: PODIATRIST

## 2022-12-21 PROCEDURE — 1159F MED LIST DOCD IN RCRD: CPT | Mod: CPTII,,, | Performed by: PODIATRIST

## 2022-12-21 PROCEDURE — 99999 PR PBB SHADOW E&M-EST. PATIENT-LVL V: CPT | Mod: PBBFAC,,, | Performed by: PODIATRIST

## 2022-12-21 PROCEDURE — 3008F BODY MASS INDEX DOCD: CPT | Mod: CPTII,,, | Performed by: PODIATRIST

## 2022-12-21 PROCEDURE — 3079F PR MOST RECENT DIASTOLIC BLOOD PRESSURE 80-89 MM HG: ICD-10-PCS | Mod: CPTII,,, | Performed by: PODIATRIST

## 2022-12-21 PROCEDURE — 1159F PR MEDICATION LIST DOCUMENTED IN MEDICAL RECORD: ICD-10-PCS | Mod: CPTII,,, | Performed by: PODIATRIST

## 2022-12-21 PROCEDURE — 99203 OFFICE O/P NEW LOW 30 MIN: CPT | Mod: 25,S$PBB,, | Performed by: PODIATRIST

## 2022-12-21 PROCEDURE — 3008F PR BODY MASS INDEX (BMI) DOCUMENTED: ICD-10-PCS | Mod: CPTII,,, | Performed by: PODIATRIST

## 2022-12-21 PROCEDURE — 3077F PR MOST RECENT SYSTOLIC BLOOD PRESSURE >= 140 MM HG: ICD-10-PCS | Mod: CPTII,,, | Performed by: PODIATRIST

## 2022-12-21 PROCEDURE — 1160F RVW MEDS BY RX/DR IN RCRD: CPT | Mod: CPTII,,, | Performed by: PODIATRIST

## 2022-12-21 PROCEDURE — 29580 STRAPPING UNNA BOOT: CPT | Mod: PBBFAC,PO | Performed by: PODIATRIST

## 2022-12-21 PROCEDURE — 1160F PR REVIEW ALL MEDS BY PRESCRIBER/CLIN PHARMACIST DOCUMENTED: ICD-10-PCS | Mod: CPTII,,, | Performed by: PODIATRIST

## 2022-12-21 PROCEDURE — 3079F DIAST BP 80-89 MM HG: CPT | Mod: CPTII,,, | Performed by: PODIATRIST

## 2022-12-21 PROCEDURE — 99203 PR OFFICE/OUTPT VISIT, NEW, LEVL III, 30-44 MIN: ICD-10-PCS | Mod: 25,S$PBB,, | Performed by: PODIATRIST

## 2022-12-21 PROCEDURE — 99215 OFFICE O/P EST HI 40 MIN: CPT | Mod: PBBFAC,PO,25 | Performed by: PODIATRIST

## 2022-12-21 PROCEDURE — 28570 TREAT FOOT DISLOCATION: CPT | Mod: PBBFAC,PO | Performed by: PODIATRIST

## 2022-12-21 PROCEDURE — 28570: ICD-10-PCS | Mod: S$PBB,LT,, | Performed by: PODIATRIST

## 2022-12-21 RX ORDER — FLUOXETINE HYDROCHLORIDE 40 MG/1
40 CAPSULE ORAL
COMMUNITY
Start: 2022-12-17

## 2022-12-21 NOTE — PROGRESS NOTES
[facility] - PODIATRY  [address] [place] [address], [address]  [facility] [address]  Dept: [phone]  Dept Fax: [phone]    Gurinder Augustine Jr., CHARLENE Augustine Jr.     New Patient Visit  Assessment:     1. Dislocation of tarsometatarsal joint of right foot, initial encounter [S93.324A (ICD-10-CM)]  Ambulatory referral/consult to Physical/Occupational Therapy    acetaminophen (TYLENOL) 650 MG TbSR      2. Lisfranc's sprain, left, initial encounter  Ambulatory referral/consult to Podiatry    CT Foot Without Contrast Left    Ambulatory referral/consult to Physical/Occupational Therapy    acetaminophen (TYLENOL) 650 MG TbSR      3. Localized edema  acetaminophen (TYLENOL) 650 MG TbSR      4. Injury of left foot, initial encounter  acetaminophen (TYLENOL) 650 MG TbSR        1. Dislocation of tarsometatarsal joint of right foot, initial encounter [S93.324A (ICD-10-CM)]  -     Ambulatory referral/consult to Physical/Occupational Therapy; Future; Expected date: 12/28/2022  -     acetaminophen (TYLENOL) 650 MG TbSR; Take 1 tablet (650 mg total) by mouth every 8 (eight) hours. for 21 days  Dispense: 63 tablet; Refill: 0    2. Lisfranc's sprain, left, initial encounter  -     Ambulatory referral/consult to Podiatry  -     CT Foot Without Contrast Left; Future; Expected date: 12/21/2022  -     Ambulatory referral/consult to Physical/Occupational Therapy; Future; Expected date: 12/28/2022  -     acetaminophen (TYLENOL) 650 MG TbSR; Take 1 tablet (650 mg total) by mouth every 8 (eight) hours. for 21 days  Dispense: 63 tablet; Refill: 0    3. Localized edema  -     acetaminophen (TYLENOL) 650 MG TbSR; Take 1 tablet (650 mg total) by mouth every 8 (eight) hours. for 21 days  Dispense: 63 tablet; Refill: 0    4. Injury of left foot, initial encounter  -     acetaminophen (TYLENOL) 650 MG TbSR; Take 1 tablet (650 mg total) by mouth every 8 (eight) hours. for 21 days  Dispense: 63 tablet; Refill:  0      Plan:     MDM    Coding    - pt seen evaluated and treated  - discussed surgical and conservative tx options  - all alternatives, risks, benefits of all tx options were discussed in detail  -XR and MRI reviewed  -pt had a LF fx dislocation which in my opinion was operative (at time of injury) due to the lateral displacement of the metatarsals. At ~ 4 wks out from injury - not sure surgery is best option to go fwd with  -will have to watch and wait to see how injury heals  -CT ordered to better characterize injury  -pt would benefit from / Pt opts for a trial of non-operative management  -labs reviewed by me: no nsaids due to pt condition  -implemented icing/stretching regimen  - CAM boot  dispensed  -unna boot applied to the L LE for edema   - rx dispensed: tylenol OA  - referrals: PT for crutch training  -Continue use of assistive devices  Weight bearing restriction: pwbat on heel LLE  -Fracture care principles discussed  Patient education pamphlet shared and discussed and sent with patient  -XR b4 nxt visit     Follow up in about 8 weeks (around 2/15/2023).    Subjective:      Patient ID: Taty Kenney is a 62 y.o. female.    Chief Complaint:   Chief Complaint   Patient presents with    Ankle Pain     Left         DOI: 11/21/22    CC - ankle/foot injury: Patient complains of injury to the left ankle/foot. This is evaluated as a personal injury. The injury occurred abt 1 month ago, and occurred while while using eliptical. Patient did not hear or sense a pop or snap at the time of the injury. The patient notes pain and severe swelling of the foot/ankle since the injury. Patient has treated the ankle with ice. Pain is localized to the midfoot area. Patients rates pain 7/10 on pain scale. Pt waited abt 1 month prior to seeking treatment bc she thought she could heal injury on her own. She was seen by ortho PA who recognized injury and referred appropriately.     Able to ambulate: no    HPI      Outside  reports reviewed: historical medical records.  Family hx: as below  Past Medical History:   Diagnosis Date    Anxiety     Arthritis     Depression     Fractures     Headache     Hypertension     Movement disorder     Multiple sclerosis     Neuropathy      Past Surgical History:   Procedure Laterality Date    ABDOMINAL SURGERY      ANKLE FRACTURE SURGERY      ANKLE HARDWARE REMOVAL Left 10/6/2020    Procedure: REMOVAL, HARDWARE, ANKLE, medial and lateral ankle plates;  Surgeon: Triston Naidu IV, MD;  Location: [facility] OR;  Service: Orthopedics;  Laterality: Left;  hair screw removal tray (Zac notified per Carly 9/29, EF)confirmed 10/5 CW  hardware removal tray    FOOT SURGERY      HYSTERECTOMY      IRRIGATION AND DEBRIDEMENT OF LOWER EXTREMITY Left 7/24/2020    Procedure: LEFT ANKLE INFECTION IRRIGATION AND DEBRIDEMENT, possible External fixation;  Surgeon: Triston Naidu IV, MD;  Location: [facility] OR;  Service: Orthopedics;  Laterality: Left;  correct card and give to Shari  Pulse Lavage and large bone or ortho minor tray- to start case  Zac- Styker to bring ankle set and ex fix - needed in case of removal of harware-Confrimed zac- AM 07/23    OPEN REDUCTION AND INTERNAL FIXATION (ORIF) OF INJURY OF ANKLE Left 7/7/2020    Procedure: ORIF, ANKLE;  Surgeon: Triston Naidu IV, MD;  Location: [facility] OR;  Service: Orthopedics;  Laterality: Left;  GENERAL + REGIONAL  ORTHO MAJOR TRAY  BONE FOAM RAMP  FLUOROSCOPY  Hair Lopez confirmed CW 7/6     Family History   Problem Relation Age of Onset    Hypertension Mother     Hyperlipidemia Mother     Alcohol abuse Mother     Osteoporosis Mother     Cancer Father     Osteoporosis Sister     Broken bones Neg Hx     Dislocations Neg Hx     Scoliosis Neg Hx     Severe sprains Neg Hx     Anesthesia problems Neg Hx     Clotting disorder Neg Hx      Current Outpatient Medications   Medication Sig Dispense Refill    ALPRAZolam (XANAX) 1 MG tablet Take 1 mg by mouth daily as  needed.      hydroCHLOROthiazide (HYDRODIURIL) 25 MG tablet Take 25 mg by mouth once daily.      QUEtiapine (SEROQUEL) 400 MG tablet       acetaminophen (TYLENOL) 650 MG TbSR Take 1 tablet (650 mg total) by mouth every 8 (eight) hours. for 21 days 63 tablet 0    aspirin (ECOTRIN) 81 MG EC tablet Take 1 tablet (81 mg total) by mouth once daily. (Patient not taking: Reported on 12/21/2022) 30 tablet 0    FLUoxetine 40 MG capsule Take 40 mg by mouth.      naproxen (NAPROSYN) 500 MG tablet Take 1 tablet (500 mg total) by mouth 2 (two) times daily. (Patient not taking: Reported on 12/21/2022) 30 tablet 0    ondansetron (ZOFRAN-ODT) 4 MG TbDL Take 2 tablets (8 mg total) by mouth every 8 (eight) hours as needed (nausea). (Patient not taking: Reported on 12/21/2022) 15 tablet 0    propranoloL (INDERAL) 40 MG tablet Take 20 mg by mouth 2 (two) times daily.       tiZANidine (ZANAFLEX) 4 MG tablet 1-2 po qhs (Patient not taking: Reported on 12/21/2022) 60 tablet 3     No current facility-administered medications for this visit.     Facility-Administered Medications Ordered in Other Visits   Medication Dose Route Frequency Provider Last Rate Last Admin    0.9%  NaCl infusion   Intravenous Continuous Triston Naidu IV, MD        lidocaine HCL 10 mg/ml (1%) injection 1 mL  1 mL Intradermal Once PRN Triston Naidu IV, MD         Review of patient's allergies indicates:   Allergen Reactions    Augmentin [amoxicillin-pot clavulanate] Other (See Comments)     Stomach cramps     Social History     Socioeconomic History    Marital status:    Tobacco Use    Smoking status: Former    Smokeless tobacco: Never   Substance and Sexual Activity    Alcohol use: No    Drug use: No       ROS    REVIEW OF SYSTEMS: Negative as documented below as well as positive findings in bold.       Constitutional  Respiratory  Gastrointestinal  Skin   - Fever - Cough - Heartburn - Rash   - Chills - Spit blood - Nausea - Itching   - Weight Loss -  "Shortness of breath - Vomiting - Nail pain   - Malaise/Fatigue - Wheezing - Abdominal Pain  Wound/Ulcer   - Weight Gain   - Blood in Stool  Poor wound healing       - Diarrhea          Cardiovascular  Genitourinary  Neurological  HEENT   - Chest Pain - Dysuria - Dizziness - Headache   - Palpitations - Hematuria - Tingling - Congestion   - Pain at night in legs - Flank Pain - Tremor - Sore Throat   - Cramping   - Sensory Change - Blurred Vision   - Leg Swelling   - Speech Change - Double Vision   - Dizzy when standing   - Focal Weakness - Eye Redness       - Seizures - Dry Eyes       - Loss of Consciousness          Endocrine  Musculoskeletal  Psychiatric   - Cold intolerance - Muscle Pain - Depression   - Heat intolerance - Neck Pain - Insomnia   - Anemia - Joint Pain - Memory Loss   -  Easy bruising, bleeding - Heel pain - Anxiety      Toe Pain        Leg/Ankle/Foot Pain         Objective:     BP (!) 149/83 (BP Location: Left arm, Patient Position: Sitting, BP Method: X-Large (Automatic))   Pulse (!) 7   Resp 18   Ht 5' 7" (1.702 m)   Wt 81.6 kg (180 lb)   BMI 28.19 kg/m²     Physical Exam    General Appearance:   Patient appears well developed, well nourished  Patient appears stated age    Psychiatric:   Patient is oriented to time, place, and person  Patient has appropriate mood and affect    Neck:  Trachea Midline  No visible masses    Respiratory/Ears:  No distress or labored breathing.  Able to differentiate between normal talking voice and whisper.  Able to follow commands    Eyes:  Visual Acuity intact  Lids and conjunctivae normal. No discoloration noted.    Foot/Ankle Musculoskeletal Exam  Ortho Exam  Foot Exam  R LE exam con't:  V:  DP 2/4, PT 2/4   CRT< 3s to all digits tested   Tibial and popliteal lymph nodes are w/o abnormality   Edema present, varicose veins absent    N: Patient displays normal ankle reflexes   SILT in SP/DP/T/Zeferino/Saph distributions    Ortho: +Motor EHL/FHL/TA/GA   +TTP dorsal " and plantar midfoot at TMTJ 2-4   Pain with ROM at lesser TMTJ 2-4 with swelling   Instability: present   Compartments soft/compressible. No pain on passive stretch of big toe. No calf  Pain.    Derm: skin is intact, skin warm and dry, skin without ulcers or lesions, skin without induration, nails normal,no ecchymosis          Imaging / Labs:    X-Ray Foot Complete Left    Result Date: 12/21/2022  EXAMINATION: Three radiographic views of the LEFT FOOT. CLINICAL HISTORY: Pain in left foot TECHNIQUE: Three radiographic views of the LEFT FOOT. COMPARISON: Left foot radiograph 12/01/2022. FINDINGS: Three views of the left foot demonstrate normal alignment.  The irregularity at the medial 2nd metatarsal base and adjacent cuneiform is not visualized on this exam.  This may be secondary to lack of weight-bearing.  There is no fracture.  Alignment is normal.  There is no soft tissue swelling.  The postsurgical change and fracture deformity of the distal tibia and fibula is stable.     No acute abnormality of the left foot.  The irregularity at the medial 2nd metatarsal base and adjacent cuneiform seen on the prior exam is not visualized possibly secondary to the lack of weight-bearing positioning. Electronically signed by: Corby Chase MD Date:    12/21/2022 Time:    14:06    X-Ray Foot Complete 3 view Left    Result Date: 12/1/2022  EXAMINATION: XR FOOT COMPLETE 3 VIEW LEFT CLINICAL HISTORY: .  Pain, unspecified TECHNIQUE: AP, lateral and oblique views of the left foot were performed. COMPARISON: No prior similar imaging available. FINDINGS: There is some irregularity at the medial 2nd metatarsal base with slight step-off relative to the subjacent cuneiform on weight-bearing view.  Findings which raise concern for spectrum of Lisfranc injury/fracture, though age indeterminate.  To correlate with history.  Scattered mild degenerative change at the interphalangeal joints and 1st MTP.  Remote deformity of the distal fibula.      As above. This report was flagged in Epic as abnormal. Electronically signed by: Vincent Piña Date:    12/01/2022 Time:    13:22    MRI Foot (Midfoot) Left Without Contrast    Result Date: 12/6/2022  EXAMINATION: MRI FOOT (MIDFOOT) LEFT WITHOUT CONTRAST CLINICAL HISTORY: Foot trauma, Lisfranc injury suspected, xray equivocal (Age >= 6y);  Sprain of tarsometatarsal ligament of left foot, initial encounter TECHNIQUE: Multiplanar, multisequence MR imaging of the LEFT forefoot without the use of intravenous gadolinium IV contrast. COMPARISON: Radiograph of 12/01/2022 FINDINGS: Lisfranc Ligament: Abnormal appearance worrisome for injury of the interosseous component, poorly visualized.  Dorsal component likely torn.  Plantar component intact as visualized on long axis images, confirmed on short axis Alignment: Normal. Bones: Fractures at the level of the base/proximal aspect of the 2nd, 3rd, and 4th metatarsals. Tendons: Extensor tendons of the toes are normal in signal characteristics. Flexor tendons of the toes are normal in signal characteristics. Hallux-Sesamoid Complex: Normal without evidence of an effusion. MTP Joints: Normal. No evidence of effusion, synovitis, OCD, OA or dorsal osteophytes. Soft Tissues: No edema or other abnormality. Muscles: T2 signal hyperintensity of muscles of the foot associated with atrophy/neuropathic change. Vascular: Normal. Nerves: Normal. Miscellaneous: No evidence of Alberto's neuroma.     Fractures of base of 2/3/4 metatarsals. Heterogeneous poorly defined interosseous component Lisfranc ligament at least sprained if not, at least partially torn.  Plantar component appears intact.  Lisfranc injury must be suspect on the basis of the multiple midfoot fractures and the abnormal signal intensity at the level of the Lisfranc ligament. Electronically signed by: Abhilash Neville MD Date:    12/06/2022 Time:    16:52        Note: This was dictated using a computer transcription  program. Although proofread, it may contain computer transcription errors and phonetic errors. Other human proofreading errors may also exist. Corrections may be performed at a later time. Please contact us for any clarification if needed.    Gurinder Augustine DPM  [facility]

## 2022-12-21 NOTE — PATIENT INSTRUCTIONS
Lisfranc Injuries    What Is the Lisfranc Joint?      Diagram of the Lisfranc joint    The Lisfranc joint is the point at which the metatarsal bones (long bones that lead up to the toes) and the tarsal bones (bones in the arch) connect. The Lisfranc ligament is a tough band of tissue that joins two of these bones. This is important for maintaining proper alignment and strength of the joint.    How Do Lisfranc Injuries Occur?  Injuries to the Lisfranc joint most commonly occur in automobile accident victims,  personnel, runners, horseback riders, football players and participants of other contact sports, or something as simple as missing a step on a staircase. Lisfranc injuries occur as a result of direct or indirect forces to the foot. A direct force often involves something heavy falling on the foot. Indirect force commonly involves twisting the foot.    Types of Lisfranc Injuries  There are three types of Lisfranc injuries, which sometimes occur together:    Sprains. The Lisfranc ligament and other ligaments on the bottom of the midfoot are stronger than those on the top of the midfoot. Therefore, when they are weakened through a sprain (a stretching of the ligament), patients experience instability of the joint in the middle of the foot.  Fractures. A break in a bone in the Lisfranc joint can be either an avulsion fracture (a small piece of bone is pulled off) or a break through the bone or bones of the midfoot.  Dislocations. The bones of the Lisfranc joint may be forced from their normal positions.     Symptoms  Symptoms of a Lisfranc injury may include:    Swelling of the foot  Pain throughout the midfoot when standing or when pressure is applied  Inability to bear weight (in severe injuries)  Bruising or blistering on the arch are important signs of a Lisfranc injury. Bruising may also occur on the top of the foot.  Abnormal widening of the foot.     Diagnosis  Lisfranc injuries are sometimes  mistaken for ankle sprains, making the diagnostic process very important. To arrive at a diagnosis, the foot and ankle surgeon will ask questions about how the injury occurred and will examine the foot to determine the severity of the injury. X-rays and other imaging studies may be necessary to fully evaluate the extent of the injury. The surgeon may also perform an additional examination while the patient is under anesthesia to further evaluate a fracture or weakening of the joint and surrounding bones.    Nonsurgical Treatment  Anyone who has symptoms of a Lisfranc injury should see a foot and ankle surgeon right away. If unable to do so immediately, it is important to stay off the injured foot, keep it elevated (at or slightly above hip level) and apply a bag of ice wrapped in a thin towel to the area every 20 minutes of each waking hour. These steps will help keep the swelling and pain under control.    Treatment by the foot and ankle surgeon may include one or more of the following, depending on the type and severity of the Lisfranc injury:    Immobilization. Sometimes the foot is placed in a cast to keep it immobile, and crutches are used to avoid putting weight on the injured foot.  Oral medications. Nonsteroidal anti-inflammatory medications (NSAIDs), such as ibuprofen, help reduce pain and inflammation.  Ice and elevation. Swelling is reduced by icing the affected area and keeping the foot elevated, as described above.  Physical therapy. After the swelling and pain have subsided, physical therapy may be prescribed.     When Is Surgery Needed?  Certain types of Lisfranc injuries require surgery. The foot and ankle surgeon will determine the type of procedure that is best suited to the individual patient. Some injuries of this type may require emergency surgery.    Complications of Lisfranc Injuries  Complications can and often arise following Lisfranc injuries. A possible early complication following the  injury is compartment syndrome, in which pressure builds up within the tissues of the foot, requiring immediate surgery to prevent tissue damage. A buildup of pressure could damage the nerves, blood vessels and muscles in the foot. Arthritis and problems with foot alignment are very likely to develop. In most cases, arthritis develops several months after a Lisfranc injury, requiring additional treatment.        Understanding Lisfranc Joint Injury  A Lisfranc joint injury is a kind of injury to the bones or ligaments in the arch of your foot. There is often also damage to the cartilage that covers these bones. The injury gets its name from a Bengali surgeon.  Parts of the arch of the foot  In the middle region of your foot, a group of 10 small bones forms an arch. Five of these long bones (metatarsals) lead to your toes. The group also includes the cuboid bone and the medial, middle, and lateral cuneiform bones. Tight connective tissue bands hold all of these bones in place and give the joint its stability. This area of your foot is important to keep your arch steady. It also moves the force from your calves to the front of your feet when you walk.  What causes a Lisfranc joint injury?  A twisting fall may break one or more of these bones or move them out of place. This causes a Lisfranc injury. Its also called a tarsometatarsal joint injury. There are different types of Lisfranc injuries. They depend on the direction of the shifted metatarsals and how badly they are shifted.  A Lisfranc joint injury may be the result of twisting and falling on a foot that is pointing down. This is common in football and soccer players. Lisfranc injuries can also happen from a car accident.  What are the symptoms of a Lisfranc joint injury?  A Lisfranc joint injury can cause symptoms such as:  Pain in your midfoot  Pain to the touch  Swelling or deformity in the middle part of your foot  Inability to put weight on your  foot  Bruising in the middle of your foot  How is a Lisfranc joint injury diagnosed?  It is important to have a Lisfranc joint injury diagnosed correctly. It has many of the same symptoms as an ankle sprain. But the treatment for an ankle sprain is very different. Your healthcare provider will ask about your health history. He or she will ask questions about your symptoms and recent accidents. He or she will also check your foot, looking for pain, deformity, bruising, and swelling. Your healthcare provider may hold your toes and move them up and down to see if this causes pain.  You will likely have X-rays. The X-rays will be done at special angles, so they can show the injury. A Lisfranc joint injury may not show up on standard X-rays.  You may also need other imaging tests of your foot. These tests may show a Lisfranc joint injury that an X-ray may not. You may have tests such as:  MRI scan. This uses strong magnets and a computer to make images of the body. It gives more information about damage to the soft tissues in your foot.  CT scan. This uses a series of X-rays and a computer to make detailed images. A special kind of imaging dye may be used. The scan gives information about damage to your bones.  Date Last Reviewed: 4/1/2017 © 2000-2017 The [employer]. [address] [place] [address]. All rights reserved. This information is not intended as a substitute for professional medical care. Always follow your healthcare professional's instructions.      Treatment for Lisfranc Joint Injury   A Lisfranc joint injury is a kind of injury to the bones or ligaments in the arch of your foot. There is often also damage to the cartilage that covers these bones. The injury gets its name from a [place] surgeon.  Types of treatment   Your treatment may vary depending on how severe of your injury is. Your treatment may include:  Pain medicine  Wearing a nonweight-bearing cast for 6 weeks  Wearing a  weight-bearing cast or a special foot support after the first 6 weeks  Serial X-rays to see how your foot is healing  It is important not to put weight on your foot during the initial healing period.  In some cases, you may need surgery. You may need surgery if you:  Have broken bones  Your bones are not lined up correctly  Your ligaments are completely torn  The types of surgery include:  Open reduction and internal fixation (ORIF). This is the most common type of surgery for the injury. The bones are lined up correctly. Injured ligaments are repaired. Special metal plates and screws hold the bones in place. These may be removed at later date.  Joint fusion. This type of surgery is only done if the damage is very severe and cant be repaired. This surgery fuses one or more of your bones together. They then heal into a single, solid piece. Fusion is reserved for: injuries that have severe joint comminution, injuries that have failed ORIF, chronic deformity from non-treated or previously treated injuries, and some high energy acute ligamentous injuries.The published non-union rate for injuries that require a primary fusion are as high as 30%.  After surgery, you need to use a cast for several weeks. You will not be able to put weight on your foot.  Possible complications of a Lisfranc joint injury  A Lisfranc joint injury can cause arthritis in the injured bones of your foot. This can lead to chronic pain in the area. You are more likely to develop arthritis if you had a severe Lisfranc joint injury that damaged a lot of the cartilage. Arthritis may occur even if your surgery worked well. Some people need to have joint fusion surgery to relieve these symptoms if the arthritis is severe.  There is also a risk that your bones will not heal properly. This may require a follow-up surgery. These risks may be higher if you smoke or have thinned bones (osteoporosis).  When to call your healthcare provider  Call your  healthcare provider right away if you have any of these:  Fever of 100.4°F (38°C) or higher  Pain that gets worse  Numbness in your foot   Date Last Reviewed: 8/10/2015  © 1810-6138 [employer]. [address] [place] [address], [place] [address]. All rights reserved. This information is not intended as a substitute for professional medical care. Always follow your healthcare professional's instructions.

## 2022-12-23 ENCOUNTER — TELEPHONE (OUTPATIENT)
Dept: ORTHOPEDICS | Facility: CLINIC | Age: 62
End: 2022-12-23
Payer: MEDICAID

## 2022-12-23 ENCOUNTER — TELEPHONE (OUTPATIENT)
Dept: PODIATRY | Facility: CLINIC | Age: 62
End: 2022-12-23
Payer: MEDICAID

## 2022-12-23 RX ORDER — DEXTROMETHORPHAN HYDROBROMIDE, GUAIFENESIN 5; 100 MG/5ML; MG/5ML
650 LIQUID ORAL EVERY 8 HOURS
Qty: 63 TABLET | Refills: 0 | Status: SHIPPED | OUTPATIENT
Start: 2022-12-23 | End: 2023-01-13

## 2022-12-23 NOTE — TELEPHONE ENCOUNTER
----- Message from Ashlyn Morataya sent at 12/23/2022  1:38 PM CST -----  Regarding: call back  Contact: [phone]  Type:  Patient Returning Call    Who Called: PT   Who Left Message for Patient: Nurse   Does the patient know what this is regarding?: Yes   Would the patient rather a call back or a response via [facility]? Call back   Best Call Back Number: [phone]  Additional Information:

## 2022-12-23 NOTE — TELEPHONE ENCOUNTER
----- Message from Analy Zhang sent at 12/23/2022 12:02 PM CST -----  Type:  Needs Medical Advice    Who Called:  Pt  Symptoms (please be specific):  Left foot pain  How long has patient had these symptoms:  two days  Pharmacy [facility] and phone #:  [id number] [place] [address] [facility] [address]   Phone:  [phone]  Would the patient rather a call back or a response via [facility]? Call   Best Call Back Number: [phone]  Additional Information: Pt would like to be prescribed medication for foot pain  Pt is taking tylenol for pain.

## 2022-12-23 NOTE — TELEPHONE ENCOUNTER
Patient requesting something for pain, the tylenol is not helping. Would like something called in

## 2022-12-23 NOTE — TELEPHONE ENCOUNTER
tried calling patient x3, left message--per Dr Augustine  Rx will be sent into pharamcy for pain

## 2022-12-23 NOTE — TELEPHONE ENCOUNTER
----- Message from Ashlyn Morataya sent at 12/23/2022 11:25 AM CST -----  Regarding: call back  Contact: [phone]  Type:  Needs Medical Advice    Who Called: PT   Symptoms (please be specific): Left foot pain   How long has patient had these symptoms:  12/21/22  Pharm[facility] phone [facility]: [id number] [address] [facility] [address] Phone:  [phone]  Fax:  [phone]  Would the patient rather a call back or a response via [facility]? Call back   Best Call Back Number:  [phone]  Additional Information:

## 2023-01-06 ENCOUNTER — HOSPITAL ENCOUNTER (OUTPATIENT)
Dept: RADIOLOGY | Facility: HOSPITAL | Age: 63
Discharge: HOME | End: 2023-01-06
Attending: PODIATRIST
Payer: MEDICAID

## 2023-01-06 DIAGNOSIS — S93.622A LISFRANC'S SPRAIN, LEFT, INITIAL ENCOUNTER: ICD-10-CM

## 2023-01-06 PROCEDURE — 73700 CT LOWER EXTREMITY W/O DYE: CPT | Mod: TC,LT

## 2023-01-06 PROCEDURE — 76377 3D RENDER W/INTRP POSTPROCES: CPT | Mod: 26,,, | Performed by: INTERNAL MEDICINE

## 2023-01-06 PROCEDURE — 76377 3D RENDER W/INTRP POSTPROCES: CPT | Mod: TC

## 2023-01-06 PROCEDURE — 73700 CT FOOT WITHOUT CONTRAST LEFT: ICD-10-PCS | Mod: 26,LT,, | Performed by: INTERNAL MEDICINE

## 2023-01-06 PROCEDURE — 73700 CT LOWER EXTREMITY W/O DYE: CPT | Mod: 26,LT,, | Performed by: INTERNAL MEDICINE

## 2023-01-06 PROCEDURE — 76377 CT 3D RECON WITH INDEPENDENT WS: ICD-10-PCS | Mod: 26,,, | Performed by: INTERNAL MEDICINE

## 2023-02-15 DIAGNOSIS — M79.671 RIGHT FOOT PAIN: Primary | ICD-10-CM

## 2023-02-16 ENCOUNTER — OFFICE VISIT (OUTPATIENT)
Dept: PODIATRY | Facility: CLINIC | Age: 63
End: 2023-02-16
Payer: MEDICAID

## 2023-02-16 VITALS
HEART RATE: 80 BPM | HEIGHT: 67 IN | BODY MASS INDEX: 28.25 KG/M2 | SYSTOLIC BLOOD PRESSURE: 139 MMHG | DIASTOLIC BLOOD PRESSURE: 79 MMHG | WEIGHT: 180 LBS | RESPIRATION RATE: 18 BRPM

## 2023-02-16 DIAGNOSIS — S93.324D DISLOCATION OF TARSOMETATARSAL JOINT OF RIGHT FOOT, SUBSEQUENT ENCOUNTER: Primary | ICD-10-CM

## 2023-02-16 DIAGNOSIS — S93.622D LISFRANC'S SPRAIN, LEFT, SUBSEQUENT ENCOUNTER: ICD-10-CM

## 2023-02-16 PROCEDURE — 1160F PR REVIEW ALL MEDS BY PRESCRIBER/CLIN PHARMACIST DOCUMENTED: ICD-10-PCS | Mod: CPTII,,, | Performed by: PODIATRIST

## 2023-02-16 PROCEDURE — 3075F PR MOST RECENT SYSTOLIC BLOOD PRESS GE 130-139MM HG: ICD-10-PCS | Mod: CPTII,,, | Performed by: PODIATRIST

## 2023-02-16 PROCEDURE — 3078F PR MOST RECENT DIASTOLIC BLOOD PRESSURE < 80 MM HG: ICD-10-PCS | Mod: CPTII,,, | Performed by: PODIATRIST

## 2023-02-16 PROCEDURE — 99999 PR PBB SHADOW E&M-EST. PATIENT-LVL III: CPT | Mod: PBBFAC,,, | Performed by: PODIATRIST

## 2023-02-16 PROCEDURE — 3008F PR BODY MASS INDEX (BMI) DOCUMENTED: ICD-10-PCS | Mod: CPTII,,, | Performed by: PODIATRIST

## 2023-02-16 PROCEDURE — 3008F BODY MASS INDEX DOCD: CPT | Mod: CPTII,,, | Performed by: PODIATRIST

## 2023-02-16 PROCEDURE — 1159F PR MEDICATION LIST DOCUMENTED IN MEDICAL RECORD: ICD-10-PCS | Mod: CPTII,,, | Performed by: PODIATRIST

## 2023-02-16 PROCEDURE — 1160F RVW MEDS BY RX/DR IN RCRD: CPT | Mod: CPTII,,, | Performed by: PODIATRIST

## 2023-02-16 PROCEDURE — 3075F SYST BP GE 130 - 139MM HG: CPT | Mod: CPTII,,, | Performed by: PODIATRIST

## 2023-02-16 PROCEDURE — 3078F DIAST BP <80 MM HG: CPT | Mod: CPTII,,, | Performed by: PODIATRIST

## 2023-02-16 PROCEDURE — 99024 POSTOP FOLLOW-UP VISIT: CPT | Mod: ,,, | Performed by: PODIATRIST

## 2023-02-16 PROCEDURE — 99213 OFFICE O/P EST LOW 20 MIN: CPT | Mod: PBBFAC,PO | Performed by: PODIATRIST

## 2023-02-16 PROCEDURE — 99024 PR POST-OP FOLLOW-UP VISIT: ICD-10-PCS | Mod: ,,, | Performed by: PODIATRIST

## 2023-02-16 PROCEDURE — 99999 PR PBB SHADOW E&M-EST. PATIENT-LVL III: ICD-10-PCS | Mod: PBBFAC,,, | Performed by: PODIATRIST

## 2023-02-16 PROCEDURE — 1159F MED LIST DOCD IN RCRD: CPT | Mod: CPTII,,, | Performed by: PODIATRIST

## 2023-02-16 NOTE — PATIENT INSTRUCTIONS
Lisfranc Injuries    What Is the Lisfranc Joint?      Diagram of the Lisfranc joint    The Lisfranc joint is the point at which the metatarsal bones (long bones that lead up to the toes) and the tarsal bones (bones in the arch) connect. The Lisfranc ligament is a tough band of tissue that joins two of these bones. This is important for maintaining proper alignment and strength of the joint.    How Do Lisfranc Injuries Occur?  Injuries to the Lisfranc joint most commonly occur in automobile accident victims,  personnel, runners, horseback riders, football players and participants of other contact sports, or something as simple as missing a step on a staircase. Lisfranc injuries occur as a result of direct or indirect forces to the foot. A direct force often involves something heavy falling on the foot. Indirect force commonly involves twisting the foot.    Types of Lisfranc Injuries  There are three types of Lisfranc injuries, which sometimes occur together:    Sprains. The Lisfranc ligament and other ligaments on the bottom of the midfoot are stronger than those on the top of the midfoot. Therefore, when they are weakened through a sprain (a stretching of the ligament), patients experience instability of the joint in the middle of the foot.  Fractures. A break in a bone in the Lisfranc joint can be either an avulsion fracture (a small piece of bone is pulled off) or a break through the bone or bones of the midfoot.  Dislocations. The bones of the Lisfranc joint may be forced from their normal positions.     Symptoms  Symptoms of a Lisfranc injury may include:    Swelling of the foot  Pain throughout the midfoot when standing or when pressure is applied  Inability to bear weight (in severe injuries)  Bruising or blistering on the arch are important signs of a Lisfranc injury. Bruising may also occur on the top of the foot.  Abnormal widening of the foot.     Diagnosis  Lisfranc injuries are sometimes  mistaken for ankle sprains, making the diagnostic process very important. To arrive at a diagnosis, the foot and ankle surgeon will ask questions about how the injury occurred and will examine the foot to determine the severity of the injury. X-rays and other imaging studies may be necessary to fully evaluate the extent of the injury. The surgeon may also perform an additional examination while the patient is under anesthesia to further evaluate a fracture or weakening of the joint and surrounding bones.    Nonsurgical Treatment  Anyone who has symptoms of a Lisfranc injury should see a foot and ankle surgeon right away. If unable to do so immediately, it is important to stay off the injured foot, keep it elevated (at or slightly above hip level) and apply a bag of ice wrapped in a thin towel to the area every 20 minutes of each waking hour. These steps will help keep the swelling and pain under control.    Treatment by the foot and ankle surgeon may include one or more of the following, depending on the type and severity of the Lisfranc injury:    Immobilization. Sometimes the foot is placed in a cast to keep it immobile, and crutches are used to avoid putting weight on the injured foot.  Oral medications. Nonsteroidal anti-inflammatory medications (NSAIDs), such as ibuprofen, help reduce pain and inflammation.  Ice and elevation. Swelling is reduced by icing the affected area and keeping the foot elevated, as described above.  Physical therapy. After the swelling and pain have subsided, physical therapy may be prescribed.     When Is Surgery Needed?  Certain types of Lisfranc injuries require surgery. The foot and ankle surgeon will determine the type of procedure that is best suited to the individual patient. Some injuries of this type may require emergency surgery.    Complications of Lisfranc Injuries  Complications can and often arise following Lisfranc injuries. A possible early complication following the  injury is compartment syndrome, in which pressure builds up within the tissues of the foot, requiring immediate surgery to prevent tissue damage. A buildup of pressure could damage the nerves, blood vessels and muscles in the foot. Arthritis and problems with foot alignment are very likely to develop. In most cases, arthritis develops several months after a Lisfranc injury, requiring additional treatment.        Understanding Lisfranc Joint Injury  A Lisfranc joint injury is a kind of injury to the bones or ligaments in the arch of your foot. There is often also damage to the cartilage that covers these bones. The injury gets its name from a English surgeon.  Parts of the arch of the foot  In the middle region of your foot, a group of 10 small bones forms an arch. Five of these long bones (metatarsals) lead to your toes. The group also includes the cuboid bone and the medial, middle, and lateral cuneiform bones. Tight connective tissue bands hold all of these bones in place and give the joint its stability. This area of your foot is important to keep your arch steady. It also moves the force from your calves to the front of your feet when you walk.  What causes a Lisfranc joint injury?  A twisting fall may break one or more of these bones or move them out of place. This causes a Lisfranc injury. Its also called a tarsometatarsal joint injury. There are different types of Lisfranc injuries. They depend on the direction of the shifted metatarsals and how badly they are shifted.  A Lisfranc joint injury may be the result of twisting and falling on a foot that is pointing down. This is common in football and soccer players. Lisfranc injuries can also happen from a car accident.  What are the symptoms of a Lisfranc joint injury?  A Lisfranc joint injury can cause symptoms such as:  Pain in your midfoot  Pain to the touch  Swelling or deformity in the middle part of your foot  Inability to put weight on your  foot  Bruising in the middle of your foot  How is a Lisfranc joint injury diagnosed?  It is important to have a Lisfranc joint injury diagnosed correctly. It has many of the same symptoms as an ankle sprain. But the treatment for an ankle sprain is very different. Your healthcare provider will ask about your health history. He or she will ask questions about your symptoms and recent accidents. He or she will also check your foot, looking for pain, deformity, bruising, and swelling. Your healthcare provider may hold your toes and move them up and down to see if this causes pain.  You will likely have X-rays. The X-rays will be done at special angles, so they can show the injury. A Lisfranc joint injury may not show up on standard X-rays.  You may also need other imaging tests of your foot. These tests may show a Lisfranc joint injury that an X-ray may not. You may have tests such as:  MRI scan. This uses strong magnets and a computer to make images of the body. It gives more information about damage to the soft tissues in your foot.  CT scan. This uses a series of X-rays and a computer to make detailed images. A special kind of imaging dye may be used. The scan gives information about damage to your bones.  Date Last Reviewed: 4/1/2017 © 2000-2017 [employer]. [address] [place] [address]. All rights reserved. This information is not intended as a substitute for professional medical care. Always follow your healthcare professional's instructions.      Treatment for Lisfranc Joint Injury   A Lisfranc joint injury is a kind of injury to the bones or ligaments in the arch of your foot. There is often also damage to the cartilage that covers these bones. The injury gets its name from a [place] surgeon.  Types of treatment   Your treatment may vary depending on how severe of your injury is. Your treatment may include:  Pain medicine  Wearing a nonweight-bearing cast for 6 weeks  Wearing a  weight-bearing cast or a special foot support after the first 6 weeks  Serial X-rays to see how your foot is healing  It is important not to put weight on your foot during the initial healing period.  In some cases, you may need surgery. You may need surgery if you:  Have broken bones  Your bones are not lined up correctly  Your ligaments are completely torn  The types of surgery include:  Open reduction and internal fixation (ORIF). This is the most common type of surgery for the injury. The bones are lined up correctly. Injured ligaments are repaired. Special metal plates and screws hold the bones in place. These may be removed at later date.  Joint fusion. This type of surgery is only done if the damage is very severe and cant be repaired. This surgery fuses one or more of your bones together. They then heal into a single, solid piece. Fusion is reserved for: injuries that have severe joint comminution, injuries that have failed ORIF, chronic deformity from non-treated or previously treated injuries, and some high energy acute ligamentous injuries.The published non-union rate for injuries that require a primary fusion are as high as 30%.  After surgery, you need to use a cast for several weeks. You will not be able to put weight on your foot.  Possible complications of a Lisfranc joint injury  A Lisfranc joint injury can cause arthritis in the injured bones of your foot. This can lead to chronic pain in the area. You are more likely to develop arthritis if you had a severe Lisfranc joint injury that damaged a lot of the cartilage. Arthritis may occur even if your surgery worked well. Some people need to have joint fusion surgery to relieve these symptoms if the arthritis is severe.  There is also a risk that your bones will not heal properly. This may require a follow-up surgery. These risks may be higher if you smoke or have thinned bones (osteoporosis).  When to call your healthcare provider  Call your  healthcare provider right away if you have any of these:  Fever of 100.4°F (38°C) or higher  Pain that gets worse  Numbness in your foot   Date Last Reviewed: 8/10/2015  © 4730-5630 [employer]. [address] [place] [address], [place] [address]. All rights reserved. This information is not intended as a substitute for professional medical care. Always follow your healthcare professional's instructions.

## 2023-02-16 NOTE — PROGRESS NOTES
[facility] - PODIATRY  [id number] [place] [address], [address]  [facility] [place] [address]  Dept: [phone]  Dept Fax: [phone]    Gurinder Augustine Jr., CHARLENE Augustine Jr.     Established Patient Visit  Assessment:     1. Dislocation of tarsometatarsal joint of right foot, subsequent encounter        2. Lisfranc's sprain, left, subsequent encounter          1. Dislocation of tarsometatarsal joint of right foot, subsequent encounter    2. Lisfranc's sprain, left, subsequent encounter      Plan:     MDM    Coding    - pt seen evaluated and treated  - discussed surgical and conservative tx options  - all alternatives, risks, benefits of all tx options were discussed in detail  -XR reviewed  -no pain on exam  - rx dispensed: none  - referrals: none       Follow up if symptoms worsen or fail to improve.    Subjective:      Patient ID: Taty Kenney is a 63 y.o. female.    Chief Complaint:   Chief Complaint   Patient presents with    Follow-up     Left foot injury, pain injured 11/2022       DOI: 11/21/22    CC - ankle/foot injury: Patient complains of injury to the left ankle/foot. This is evaluated as a personal injury. The injury occurred abt 1 month ago, and occurred while while using eliptical. Patient did not hear or sense a pop or snap at the time of the injury. The patient notes pain and severe swelling of the foot/ankle since the injury. Patient has treated the ankle with ice. Pain is localized to the midfoot area. Patients rates pain 7/10 on pain scale. Pt waited abt 1 month prior to seeking treatment bc she thought she could heal injury on her own. She was seen by ortho PA who recognized injury and referred appropriately.     Able to ambulate: no    2/16/23:  Hx as above. Pt opted for nonop tx. No pain.        Outside reports reviewed: historical medical records.  Family hx: as below  Past Medical History:   Diagnosis Date    Anxiety     Arthritis     Depression     Fractures      Headache     Hypertension     Movement disorder     Multiple sclerosis     Neuropathy      Past Surgical History:   Procedure Laterality Date    ABDOMINAL SURGERY      ANKLE FRACTURE SURGERY      ANKLE HARDWARE REMOVAL Left 10/6/2020    Procedure: REMOVAL, HARDWARE, ANKLE, medial and lateral ankle plates;  Surgeon: Triston Naidu IV, MD;  Location: [facility] OR;  Service: Orthopedics;  Laterality: Left;  hair screw removal tray (Zac notified per Carly 9/29, EF)confirmed 10/5 CW  hardware removal tray    FOOT SURGERY      HYSTERECTOMY      IRRIGATION AND DEBRIDEMENT OF LOWER EXTREMITY Left 7/24/2020    Procedure: LEFT ANKLE INFECTION IRRIGATION AND DEBRIDEMENT, possible External fixation;  Surgeon: Triston Naidu IV, MD;  Location: [facility] OR;  Service: Orthopedics;  Laterality: Left;  correct card and give to Shari  Pulse Lavage and large bone or ortho minor tray- to start case  Zac- Styker to bring ankle set and ex fix - needed in case of removal of harware-Confrimed zac- AM 07/23    OPEN REDUCTION AND INTERNAL FIXATION (ORIF) OF INJURY OF ANKLE Left 7/7/2020    Procedure: ORIF, ANKLE;  Surgeon: Triston Naidu IV, MD;  Location: [facility] OR;  Service: Orthopedics;  Laterality: Left;  GENERAL + REGIONAL  ORTHO MAJOR TRAY  BONE FOAM RAMP  FLUOROSCOPY  Hair Steen Mohamud Lopez confirmed CW 7/6     Family History   Problem Relation Age of Onset    Hypertension Mother     Hyperlipidemia Mother     Alcohol abuse Mother     Osteoporosis Mother     Cancer Father     Osteoporosis Sister     Broken bones Neg Hx     Dislocations Neg Hx     Scoliosis Neg Hx     Severe sprains Neg Hx     Anesthesia problems Neg Hx     Clotting disorder Neg Hx      Current Outpatient Medications   Medication Sig Dispense Refill    ALPRAZolam (XANAX) 1 MG tablet Take 1 mg by mouth daily as needed.      aspirin (ECOTRIN) 81 MG EC tablet Take 1 tablet (81 mg total) by mouth once daily. 30 tablet 0    FLUoxetine 40 MG capsule Take 40 mg by mouth.       hydroCHLOROthiazide (HYDRODIURIL) 25 MG tablet Take 25 mg by mouth once daily.      naproxen (NAPROSYN) 500 MG tablet Take 1 tablet (500 mg total) by mouth 2 (two) times daily. 30 tablet 0    ondansetron (ZOFRAN-ODT) 4 MG TbDL Take 2 tablets (8 mg total) by mouth every 8 (eight) hours as needed (nausea). 15 tablet 0    propranoloL (INDERAL) 40 MG tablet Take 20 mg by mouth 2 (two) times daily.       QUEtiapine (SEROQUEL) 400 MG tablet       tiZANidine (ZANAFLEX) 4 MG tablet 1-2 po qhs 60 tablet 3     No current facility-administered medications for this visit.     Facility-Administered Medications Ordered in Other Visits   Medication Dose Route Frequency Provider Last Rate Last Admin    0.9%  NaCl infusion   Intravenous Continuous Triston Naidu IV, MD        lidocaine HCL 10 mg/ml (1%) injection 1 mL  1 mL Intradermal Once PRN Triston Naidu IV, MD         Review of patient's allergies indicates:   Allergen Reactions    Augmentin [amoxicillin-pot clavulanate] Other (See Comments)     Stomach cramps     Social History     Socioeconomic History    Marital status:    Tobacco Use    Smoking status: Former    Smokeless tobacco: Never   Substance and Sexual Activity    Alcohol use: No    Drug use: No       ROS    REVIEW OF SYSTEMS: Negative as documented below as well as positive findings in bold.       Constitutional  Respiratory  Gastrointestinal  Skin   - Fever - Cough - Heartburn - Rash   - Chills - Spit blood - Nausea - Itching   - Weight Loss - Shortness of breath - Vomiting - Nail pain   - Malaise/Fatigue - Wheezing - Abdominal Pain  Wound/Ulcer   - Weight Gain   - Blood in Stool  Poor wound healing       - Diarrhea          Cardiovascular  Genitourinary  Neurological  HEENT   - Chest Pain - Dysuria - Dizziness - Headache   - Palpitations - Hematuria - Tingling - Congestion   - Pain at night in legs - Flank Pain - Tremor - Sore Throat   - Cramping   - Sensory Change - Blurred Vision   - Leg Swelling   -  "Speech Change - Double Vision   - Dizzy when standing   - Focal Weakness - Eye Redness       - Seizures - Dry Eyes       - Loss of Consciousness          Endocrine  Musculoskeletal  Psychiatric   - Cold intolerance - Muscle Pain - Depression   - Heat intolerance - Neck Pain - Insomnia   - Anemia - Joint Pain - Memory Loss   -  Easy bruising, bleeding - Heel pain - Anxiety      Toe Pain        Leg/Ankle/Foot Pain         Objective:     /79 (BP Location: Left arm, Patient Position: Sitting, BP Method: Large (Automatic))   Pulse 80   Resp 18   Ht 5' 7" (1.702 m)   Wt 81.6 kg (180 lb)   BMI 28.19 kg/m²     Physical Exam    General Appearance:   Patient appears well developed, well nourished  Patient appears stated age    Psychiatric:   Patient is oriented to time, place, and person  Patient has appropriate mood and affect    Neck:  Trachea Midline  No visible masses    Respiratory/Ears:  No distress or labored breathing.  Able to differentiate between normal talking voice and whisper.  Able to follow commands    Eyes:  Visual Acuity intact  Lids and conjunctivae normal. No discoloration noted.    Foot/Ankle Musculoskeletal Exam  Ortho Exam  Foot Exam  R LE exam con't:  V:  DP 2/4, PT 2/4   CRT< 3s to all digits tested   Tibial and popliteal lymph nodes are w/o abnormality   Edema present, varicose veins absent    N: Patient displays normal ankle reflexes   SILT in SP/DP/T/Zeferino/Saph distributions    Ortho: +Motor EHL/FHL/TA/GA   No TTP   ROM TMTJ non painful and normal   Compartments soft/compressible. No pain on passive stretch of big toe. No calf  Pain.    Derm: skin is intact, skin warm and dry, skin without ulcers or lesions, skin without induration, nails normal,no ecchymosis          Imaging / Labs:    X-Ray Foot Complete Left    Result Date: 12/21/2022  EXAMINATION: Three radiographic views of the LEFT FOOT. CLINICAL HISTORY: Pain in left foot TECHNIQUE: Three radiographic views of the LEFT FOOT. " COMPARISON: Left foot radiograph 12/01/2022. FINDINGS: Three views of the left foot demonstrate normal alignment.  The irregularity at the medial 2nd metatarsal base and adjacent cuneiform is not visualized on this exam.  This may be secondary to lack of weight-bearing.  There is no fracture.  Alignment is normal.  There is no soft tissue swelling.  The postsurgical change and fracture deformity of the distal tibia and fibula is stable.     No acute abnormality of the left foot.  The irregularity at the medial 2nd metatarsal base and adjacent cuneiform seen on the prior exam is not visualized possibly secondary to the lack of weight-bearing positioning. Electronically signed by: Corby Chase MD Date:    12/21/2022 Time:    14:06    X-Ray Foot Complete 3 view Left    Result Date: 12/1/2022  EXAMINATION: XR FOOT COMPLETE 3 VIEW LEFT CLINICAL HISTORY: .  Pain, unspecified TECHNIQUE: AP, lateral and oblique views of the left foot were performed. COMPARISON: No prior similar imaging available. FINDINGS: There is some irregularity at the medial 2nd metatarsal base with slight step-off relative to the subjacent cuneiform on weight-bearing view.  Findings which raise concern for spectrum of Lisfranc injury/fracture, though age indeterminate.  To correlate with history.  Scattered mild degenerative change at the interphalangeal joints and 1st MTP.  Remote deformity of the distal fibula.     As above. This report was flagged in Epic as abnormal. Electronically signed by: Vincent Piña Date:    12/01/2022 Time:    13:22    MRI Foot (Midfoot) Left Without Contrast    Result Date: 12/6/2022  EXAMINATION: MRI FOOT (MIDFOOT) LEFT WITHOUT CONTRAST CLINICAL HISTORY: Foot trauma, Lisfranc injury suspected, xray equivocal (Age >= 6y);  Sprain of tarsometatarsal ligament of left foot, initial encounter TECHNIQUE: Multiplanar, multisequence MR imaging of the LEFT forefoot without the use of intravenous gadolinium IV contrast.  COMPARISON: Radiograph of 12/01/2022 FINDINGS: Lisfranc Ligament: Abnormal appearance worrisome for injury of the interosseous component, poorly visualized.  Dorsal component likely torn.  Plantar component intact as visualized on long axis images, confirmed on short axis Alignment: Normal. Bones: Fractures at the level of the base/proximal aspect of the 2nd, 3rd, and 4th metatarsals. Tendons: Extensor tendons of the toes are normal in signal characteristics. Flexor tendons of the toes are normal in signal characteristics. Hallux-Sesamoid Complex: Normal without evidence of an effusion. MTP Joints: Normal. No evidence of effusion, synovitis, OCD, OA or dorsal osteophytes. Soft Tissues: No edema or other abnormality. Muscles: T2 signal hyperintensity of muscles of the foot associated with atrophy/neuropathic change. Vascular: Normal. Nerves: Normal. Miscellaneous: No evidence of Alberto's neuroma.     Fractures of base of 2/3/4 metatarsals. Heterogeneous poorly defined interosseous component Lisfranc ligament at least sprained if not, at least partially torn.  Plantar component appears intact.  Lisfranc injury must be suspect on the basis of the multiple midfoot fractures and the abnormal signal intensity at the level of the Lisfranc ligament. Electronically signed by: Abhilash Neville MD Date:    12/06/2022 Time:    16:52        Note: This was dictated using a computer transcription program. Although proofread, it may contain computer transcription errors and phonetic errors. Other human proofreading errors may also exist. Corrections may be performed at a later time. Please contact us for any clarification if needed.    Gurinder Augustine DPM  [facility]

## 2023-03-01 ENCOUNTER — TELEPHONE (OUTPATIENT)
Dept: ORTHOPEDICS | Facility: CLINIC | Age: 63
End: 2023-03-01
Payer: MEDICAID

## 2023-03-01 DIAGNOSIS — R52 PAIN: Primary | ICD-10-CM

## 2023-03-02 ENCOUNTER — HOSPITAL ENCOUNTER (OUTPATIENT)
Dept: RADIOLOGY | Facility: HOSPITAL | Age: 63
Discharge: HOME | End: 2023-03-02
Attending: PHYSICIAN ASSISTANT
Payer: MEDICAID

## 2023-03-02 ENCOUNTER — OFFICE VISIT (OUTPATIENT)
Dept: ORTHOPEDICS | Facility: CLINIC | Age: 63
End: 2023-03-02
Payer: MEDICAID

## 2023-03-02 VITALS
WEIGHT: 179.88 LBS | BODY MASS INDEX: 28.23 KG/M2 | SYSTOLIC BLOOD PRESSURE: 109 MMHG | HEIGHT: 67 IN | DIASTOLIC BLOOD PRESSURE: 71 MMHG | HEART RATE: 84 BPM

## 2023-03-02 DIAGNOSIS — M17.12 PRIMARY OSTEOARTHRITIS OF LEFT KNEE: Primary | ICD-10-CM

## 2023-03-02 DIAGNOSIS — M62.81 QUADRICEPS WEAKNESS: ICD-10-CM

## 2023-03-02 DIAGNOSIS — R52 PAIN: ICD-10-CM

## 2023-03-02 DIAGNOSIS — M23.92 INTERNAL DERANGEMENT OF LEFT KNEE: ICD-10-CM

## 2023-03-02 PROCEDURE — 1160F PR REVIEW ALL MEDS BY PRESCRIBER/CLIN PHARMACIST DOCUMENTED: ICD-10-PCS | Mod: CPTII,,, | Performed by: PHYSICIAN ASSISTANT

## 2023-03-02 PROCEDURE — 99213 OFFICE O/P EST LOW 20 MIN: CPT | Mod: PBBFAC,PN | Performed by: PHYSICIAN ASSISTANT

## 2023-03-02 PROCEDURE — 99999 PR PBB SHADOW E&M-EST. PATIENT-LVL III: ICD-10-PCS | Mod: PBBFAC,,, | Performed by: PHYSICIAN ASSISTANT

## 2023-03-02 PROCEDURE — 3008F BODY MASS INDEX DOCD: CPT | Mod: CPTII,,, | Performed by: PHYSICIAN ASSISTANT

## 2023-03-02 PROCEDURE — 1159F PR MEDICATION LIST DOCUMENTED IN MEDICAL RECORD: ICD-10-PCS | Mod: CPTII,,, | Performed by: PHYSICIAN ASSISTANT

## 2023-03-02 PROCEDURE — 73564 XR KNEE COMP 4 OR MORE VIEWS LEFT: ICD-10-PCS | Mod: 26,LT,, | Performed by: RADIOLOGY

## 2023-03-02 PROCEDURE — 99214 PR OFFICE/OUTPT VISIT, EST, LEVL IV, 30-39 MIN: ICD-10-PCS | Mod: 25,S$PBB,, | Performed by: PHYSICIAN ASSISTANT

## 2023-03-02 PROCEDURE — 99999 PR PBB SHADOW E&M-EST. PATIENT-LVL III: CPT | Mod: PBBFAC,,, | Performed by: PHYSICIAN ASSISTANT

## 2023-03-02 PROCEDURE — 20610 DRAIN/INJ JOINT/BURSA W/O US: CPT | Mod: S$PBB,LT,, | Performed by: PHYSICIAN ASSISTANT

## 2023-03-02 PROCEDURE — 3074F SYST BP LT 130 MM HG: CPT | Mod: CPTII,,, | Performed by: PHYSICIAN ASSISTANT

## 2023-03-02 PROCEDURE — 3078F PR MOST RECENT DIASTOLIC BLOOD PRESSURE < 80 MM HG: ICD-10-PCS | Mod: CPTII,,, | Performed by: PHYSICIAN ASSISTANT

## 2023-03-02 PROCEDURE — 1159F MED LIST DOCD IN RCRD: CPT | Mod: CPTII,,, | Performed by: PHYSICIAN ASSISTANT

## 2023-03-02 PROCEDURE — 73564 X-RAY EXAM KNEE 4 OR MORE: CPT | Mod: 26,LT,, | Performed by: RADIOLOGY

## 2023-03-02 PROCEDURE — 3078F DIAST BP <80 MM HG: CPT | Mod: CPTII,,, | Performed by: PHYSICIAN ASSISTANT

## 2023-03-02 PROCEDURE — 73564 X-RAY EXAM KNEE 4 OR MORE: CPT | Mod: TC,PN,LT

## 2023-03-02 PROCEDURE — 99214 OFFICE O/P EST MOD 30 MIN: CPT | Mod: 25,S$PBB,, | Performed by: PHYSICIAN ASSISTANT

## 2023-03-02 PROCEDURE — 3074F PR MOST RECENT SYSTOLIC BLOOD PRESSURE < 130 MM HG: ICD-10-PCS | Mod: CPTII,,, | Performed by: PHYSICIAN ASSISTANT

## 2023-03-02 PROCEDURE — 20610 DRAIN/INJ JOINT/BURSA W/O US: CPT | Mod: PBBFAC,PN,LT | Performed by: PHYSICIAN ASSISTANT

## 2023-03-02 PROCEDURE — 20610 LARGE JOINT ASPIRATION/INJECTION: L KNEE: ICD-10-PCS | Mod: S$PBB,LT,, | Performed by: PHYSICIAN ASSISTANT

## 2023-03-02 PROCEDURE — 1160F RVW MEDS BY RX/DR IN RCRD: CPT | Mod: CPTII,,, | Performed by: PHYSICIAN ASSISTANT

## 2023-03-02 PROCEDURE — 3008F PR BODY MASS INDEX (BMI) DOCUMENTED: ICD-10-PCS | Mod: CPTII,,, | Performed by: PHYSICIAN ASSISTANT

## 2023-03-02 RX ORDER — OMEPRAZOLE 40 MG/1
40 CAPSULE, DELAYED RELEASE ORAL
COMMUNITY
Start: 2022-12-22

## 2023-03-02 RX ORDER — TRIAMCINOLONE ACETONIDE 40 MG/ML
40 INJECTION, SUSPENSION INTRA-ARTICULAR; INTRAMUSCULAR
Status: DISCONTINUED | OUTPATIENT
Start: 2023-03-02 | End: 2023-03-02 | Stop reason: HOSPADM

## 2023-03-02 RX ADMIN — TRIAMCINOLONE ACETONIDE 40 MG: 40 INJECTION, SUSPENSION INTRA-ARTICULAR; INTRAMUSCULAR at 02:03

## 2023-03-02 NOTE — PROGRESS NOTES
"Subjective:      Patient ID: Taty Kenney is a 63 y.o. female.    Chief Complaint: Pain of the Left Knee      64yo female presents with couple month history of left knee pain. Pain is medially. Tripped over an elevator.Alaina saw patient for left foot lisfranc injury. She was having knee pain then but her foot injury was the more presssing injury at the time. She is in a short cam walker boot today. Has pain with ambulation and crossing legs. States her knee feels like it "wants to pop." Notes swelling medially. Taking tylenol and naproxen without any relief.       Review of Systems   Constitutional: Negative for chills and fever.   Cardiovascular:  Negative for chest pain.   Respiratory:  Negative for cough.    Hematologic/Lymphatic: Does not bruise/bleed easily.   Skin:  Negative for poor wound healing and rash.   Musculoskeletal:  Positive for falls, joint pain, myalgias and stiffness.   Gastrointestinal:  Negative for abdominal pain.   Genitourinary:  Negative for bladder incontinence.   Neurological:  Negative for dizziness, loss of balance and weakness.   Psychiatric/Behavioral:  Negative for altered mental status.      Review of patient's allergies indicates:   Allergen Reactions    Augmentin [amoxicillin-pot clavulanate] Other (See Comments)     Stomach cramps        Current Outpatient Medications   Medication Sig Dispense Refill    ALPRAZolam (XANAX) 1 MG tablet Take 1 mg by mouth daily as needed.      aspirin (ECOTRIN) 81 MG EC tablet Take 1 tablet (81 mg total) by mouth once daily. 30 tablet 0    FLUoxetine 40 MG capsule Take 40 mg by mouth.      hydroCHLOROthiazide (HYDRODIURIL) 25 MG tablet Take 25 mg by mouth once daily.      naproxen (NAPROSYN) 500 MG tablet Take 1 tablet (500 mg total) by mouth 2 (two) times daily. 30 tablet 0    omeprazole (PRILOSEC) 40 MG capsule Take 40 mg by mouth.      ondansetron (ZOFRAN-ODT) 4 MG TbDL Take 2 tablets (8 mg total) by mouth every 8 (eight) hours as " "needed (nausea). 15 tablet 0    propranoloL (INDERAL) 40 MG tablet Take 20 mg by mouth 2 (two) times daily.       QUEtiapine (SEROQUEL) 400 MG tablet       tiZANidine (ZANAFLEX) 4 MG tablet 1-2 po qhs 60 tablet 3     No current facility-administered medications for this visit.     Facility-Administered Medications Ordered in Other Visits   Medication Dose Route Frequency Provider Last Rate Last Admin    0.9%  NaCl infusion   Intravenous Continuous Triston Naidu IV, MD        lidocaine HCL 10 mg/ml (1%) injection 1 mL  1 mL Intradermal Once PRN Triston Naidu IV, MD            The patient's relevant past medical, surgical, and social history was reviewed in Epic.       Objective:      VITAL SIGNS: /71   Pulse 84   Ht 5' 7" (1.702 m)   Wt 81.6 kg (179 lb 14.3 oz)   BMI 28.18 kg/m²     General    Nursing note and vitals reviewed.  Constitutional: She is oriented to person, place, and time. She appears well-developed and well-nourished.   Neurological: She is alert and oriented to person, place, and time.     General Musculoskeletal Exam   Gait: antalgic         Left Knee Exam     Inspection   Swelling: present    Tenderness   The patient tender to palpation of the medial joint line.    Range of Motion   Extension:  5   Flexion:  120     Tests   Meniscus   Dallas:  Medial - positive   Stability   Lachman: normal (-1 to 2mm)   MCL - Valgus: normal (0 to 2mm)    Other   Sensation: normal    Muscle Strength   Right Lower Extremity   Quadriceps:  4/5   Left Lower Extremity   Quadriceps:  3/5      X-Ray Knee Complete 4 or More Views Left  Narrative: EXAMINATION:  XR KNEE COMP 4 OR MORE VIEWS LEFT    CLINICAL HISTORY:  Pain, unspecified    TECHNIQUE:  Left knee radiograph series.    COMPARISON:  None.    FINDINGS:  No acute fracture, dislocation, or osseous destruction.  Narrowing at the lateral femorotibial compartment, more-so on weight-bearing flexion view.  No significant osteophyte production.  Patellofemoral " cartilage space appears maintained.  Mild suprapatellar density suggesting fusion.  Impression: As above.    Electronically signed by: Vincent Piña  Date:    03/02/2023  Time:    15:13    I have reviewed the above radiograph and agree with the findings stated by the radiologist.         Assessment:       1. Primary osteoarthritis of left knee    2. Internal derangement of left knee    3. Quadriceps weakness          Plan:         Taty was seen today for pain.    Diagnoses and all orders for this visit:    Primary osteoarthritis of left knee  -     Ambulatory referral/consult to Physical/Occupational Therapy; Future  -     Large Joint Aspiration/Injection: L knee    Internal derangement of left knee  -     Ambulatory referral/consult to Physical/Occupational Therapy; Future    Quadriceps weakness  -     Ambulatory referral/consult to Physical/Occupational Therapy; Future    Left knee osteoarthritis but mostly in lateral compartment. Patients pain today is medially. Slightly positive McMurrays so may have a medial meniscus tear. Discussed the options with the patient today. She would like to try conservative treatment first. This would include physical therapy and corticosteroid injection. Will see how this does. If no relief, consider MRI of the left knee.     Diagnoses and plan discussed with the patient, as well as the expected course and duration of his symptoms.  All questions and concerns were addressed prior to the end of the visit.   Instructed patient to call office if they have any future questions/concerns or to schedule apt. Patient will return to see me if symptoms worsen or fail to improve    Note dictated with voice recognition software, please excuse any grammatical errors.        Nikki Dukes PA-C   03/02/2023

## 2023-03-02 NOTE — PROCEDURES
Large Joint Aspiration/Injection: L knee    Date/Time: 3/2/2023 2:30 PM  Performed by: Nikki Dukes PA-C  Authorized by: Nikki Dukes PA-C     Consent Done?:  Yes (Verbal)  Indications:  Pain  Site marked: the procedure site was marked    Timeout: prior to procedure the correct patient, procedure, and site was verified    Prep: patient was prepped and draped in usual sterile fashion    Local anesthesia used?: No    Local anesthetic:  Topical anesthetic and lidocaine 1% without epinephrine    Details:  Needle Size:  22 G  Ultrasonic Guidance for needle placement?: No    Approach:  Anterolateral  Location:  Knee  Site:  L knee  Medications:  40 mg triamcinolone acetonide 40 mg/mL  Patient tolerance:  Patient tolerated the procedure well with no immediate complications

## 2023-06-29 ENCOUNTER — TELEPHONE (OUTPATIENT)
Dept: ORTHOPEDICS | Facility: CLINIC | Age: 63
End: 2023-06-29
Payer: MEDICAID

## 2023-06-29 NOTE — TELEPHONE ENCOUNTER
Spoke with patient to schedule next available 8/7. Notified patient that she can use otc Voltaren gel for her knee pain and Tylenol extra strength as directed to help with pain until upcoming appointment

## 2023-06-29 NOTE — TELEPHONE ENCOUNTER
----- Message from Erica Palomino sent at 6/29/2023  1:42 PM CDT -----  .Type:  Needs Medical Advice    Who Called: pt    Would the patient rather a call back or a response via [facility]? Call back  Best Call Back Number: [phone]  Additional Information:     Pt would like a call back concerning a referral that was sent over

## 2023-06-29 NOTE — TELEPHONE ENCOUNTER
Lvm leaving medicaid hotline for further assistance in scheduling. Also notified patient she can give our office a call back if she wish to schedule follow up with VIOLETTA Jordan

## 2023-06-30 ENCOUNTER — TELEPHONE (OUTPATIENT)
Dept: ORTHOPEDICS | Facility: CLINIC | Age: 63
End: 2023-06-30
Payer: MEDICAID

## 2023-06-30 NOTE — TELEPHONE ENCOUNTER
----- Message from Shalini Beauchamp sent at 6/30/2023  3:07 PM CDT -----  Type:  Patient Returning Call    Who Called:Pt   Who Left Message for Patient:?  Does the patient know what this is regarding?:yes   Would the patient rather a call back or a response via [facility]? Call back   Best Call Back Number:[phone]  Additional Information: pt says she received a call to call Dr Naidu office to be seen sooner ... do not name or could not hear name ... pt wants pain  medication to be called in for her until she can be seen

## 2023-07-27 ENCOUNTER — TELEPHONE (OUTPATIENT)
Dept: ORTHOPEDICS | Facility: CLINIC | Age: 63
End: 2023-07-27
Payer: MEDICAID

## 2023-07-27 NOTE — TELEPHONE ENCOUNTER
----- Message from Erica Palomino sent at 7/27/2023 10:37 AM CDT -----  .Type:  Needs Medical Advice    Who Called: pt    Would the patient rather a call back or a response via [facility]? Call back  Best Call Back Number: [phone]  Additional Information:     Pt would like a call back to see if she can get an earlier appt for next week

## 2023-08-02 ENCOUNTER — OFFICE VISIT (OUTPATIENT)
Dept: ORTHOPEDICS | Facility: CLINIC | Age: 63
End: 2023-08-02
Payer: MEDICAID

## 2023-08-02 VITALS
SYSTOLIC BLOOD PRESSURE: 108 MMHG | HEIGHT: 67 IN | BODY MASS INDEX: 35.95 KG/M2 | HEART RATE: 101 BPM | DIASTOLIC BLOOD PRESSURE: 74 MMHG | WEIGHT: 229.06 LBS

## 2023-08-02 DIAGNOSIS — M17.12 PRIMARY OSTEOARTHRITIS OF LEFT KNEE: Primary | ICD-10-CM

## 2023-08-02 PROCEDURE — 20610 LARGE JOINT ASPIRATION/INJECTION: L KNEE: ICD-10-PCS | Mod: S$PBB,LT,, | Performed by: PHYSICIAN ASSISTANT

## 2023-08-02 PROCEDURE — 99213 OFFICE O/P EST LOW 20 MIN: CPT | Mod: PBBFAC,PN,25 | Performed by: PHYSICIAN ASSISTANT

## 2023-08-02 PROCEDURE — 99999 PR PBB SHADOW E&M-EST. PATIENT-LVL III: ICD-10-PCS | Mod: PBBFAC,,, | Performed by: PHYSICIAN ASSISTANT

## 2023-08-02 PROCEDURE — 99213 OFFICE O/P EST LOW 20 MIN: CPT | Mod: 25,S$PBB,, | Performed by: PHYSICIAN ASSISTANT

## 2023-08-02 PROCEDURE — 3074F SYST BP LT 130 MM HG: CPT | Mod: CPTII,,, | Performed by: PHYSICIAN ASSISTANT

## 2023-08-02 PROCEDURE — 1159F PR MEDICATION LIST DOCUMENTED IN MEDICAL RECORD: ICD-10-PCS | Mod: CPTII,,, | Performed by: PHYSICIAN ASSISTANT

## 2023-08-02 PROCEDURE — 99213 PR OFFICE/OUTPT VISIT, EST, LEVL III, 20-29 MIN: ICD-10-PCS | Mod: 25,S$PBB,, | Performed by: PHYSICIAN ASSISTANT

## 2023-08-02 PROCEDURE — 99999PBSHW PR PBB SHADOW TECHNICAL ONLY FILED TO HB: ICD-10-PCS | Mod: PBBFAC,,,

## 2023-08-02 PROCEDURE — 3008F PR BODY MASS INDEX (BMI) DOCUMENTED: ICD-10-PCS | Mod: CPTII,,, | Performed by: PHYSICIAN ASSISTANT

## 2023-08-02 PROCEDURE — 1159F MED LIST DOCD IN RCRD: CPT | Mod: CPTII,,, | Performed by: PHYSICIAN ASSISTANT

## 2023-08-02 PROCEDURE — 3008F BODY MASS INDEX DOCD: CPT | Mod: CPTII,,, | Performed by: PHYSICIAN ASSISTANT

## 2023-08-02 PROCEDURE — 3078F PR MOST RECENT DIASTOLIC BLOOD PRESSURE < 80 MM HG: ICD-10-PCS | Mod: CPTII,,, | Performed by: PHYSICIAN ASSISTANT

## 2023-08-02 PROCEDURE — 99999PBSHW PR PBB SHADOW TECHNICAL ONLY FILED TO HB: Mod: PBBFAC,,,

## 2023-08-02 PROCEDURE — 20610 DRAIN/INJ JOINT/BURSA W/O US: CPT | Mod: S$PBB,LT,, | Performed by: PHYSICIAN ASSISTANT

## 2023-08-02 PROCEDURE — 20610 DRAIN/INJ JOINT/BURSA W/O US: CPT | Mod: PBBFAC,PN,LT | Performed by: PHYSICIAN ASSISTANT

## 2023-08-02 PROCEDURE — 99999 PR PBB SHADOW E&M-EST. PATIENT-LVL III: CPT | Mod: PBBFAC,,, | Performed by: PHYSICIAN ASSISTANT

## 2023-08-02 PROCEDURE — 3074F PR MOST RECENT SYSTOLIC BLOOD PRESSURE < 130 MM HG: ICD-10-PCS | Mod: CPTII,,, | Performed by: PHYSICIAN ASSISTANT

## 2023-08-02 PROCEDURE — 3078F DIAST BP <80 MM HG: CPT | Mod: CPTII,,, | Performed by: PHYSICIAN ASSISTANT

## 2023-08-02 RX ORDER — TRIAMCINOLONE ACETONIDE 40 MG/ML
40 INJECTION, SUSPENSION INTRA-ARTICULAR; INTRAMUSCULAR
Status: DISCONTINUED | OUTPATIENT
Start: 2023-08-02 | End: 2023-08-02 | Stop reason: HOSPADM

## 2023-08-02 RX ADMIN — TRIAMCINOLONE ACETONIDE 40 MG: 40 INJECTION, SUSPENSION INTRA-ARTICULAR; INTRAMUSCULAR at 02:08

## 2023-08-02 NOTE — PROCEDURES
Large Joint Aspiration/Injection: L knee    Date/Time: 8/2/2023 2:45 PM    Performed by: Nikki Dukes PA-C  Authorized by: Nikki Dukes PA-C    Consent Done?:  Yes (Verbal)  Indications:  Pain  Site marked: the procedure site was marked    Timeout: prior to procedure the correct patient, procedure, and site was verified    Prep: patient was prepped and draped in usual sterile fashion    Local anesthesia used?: No    Local anesthetic:  Topical anesthetic and lidocaine 1% without epinephrine    Details:  Needle Size:  22 G  Ultrasonic Guidance for needle placement?: No    Approach:  Anterolateral  Location:  Knee  Site:  L knee  Medications:  40 mg triamcinolone acetonide 40 mg/mL  Patient tolerance:  Patient tolerated the procedure well with no immediate complications

## 2023-08-02 NOTE — PROGRESS NOTES
Subjective:      Patient ID: Taty Kenney is a 63 y.o. female.    Chief Complaint: Pain of the Left Knee      64yo female follow up left knee pain/arthritis. Concerned for possible medial meniscus tear. Previous corticosteroid injection helped well until about 2-3 weeks ago after she had a fall. I referred patient to physical therapy also. Patient states she went but I do not see any PT visit notes in Epic. She still has pain medially. Denies mechanical symptoms. In wheelchair today.         Review of Systems   Constitutional: Negative for chills and fever.   Cardiovascular:  Negative for chest pain.   Respiratory:  Negative for cough.    Hematologic/Lymphatic: Does not bruise/bleed easily.   Skin:  Negative for poor wound healing and rash.   Musculoskeletal:  Positive for arthritis, joint pain, myalgias and stiffness.   Gastrointestinal:  Negative for abdominal pain.   Genitourinary:  Negative for bladder incontinence.   Neurological:  Negative for dizziness, loss of balance and weakness.   Psychiatric/Behavioral:  Negative for altered mental status.        Review of patient's allergies indicates:   Allergen Reactions    Augmentin [amoxicillin-pot clavulanate] Other (See Comments)     Stomach cramps        Current Outpatient Medications   Medication Sig Dispense Refill    ALPRAZolam (XANAX) 1 MG tablet Take 1 mg by mouth daily as needed.      aspirin (ECOTRIN) 81 MG EC tablet Take 1 tablet (81 mg total) by mouth once daily. 30 tablet 0    FLUoxetine 40 MG capsule Take 40 mg by mouth.      hydroCHLOROthiazide (HYDRODIURIL) 25 MG tablet Take 25 mg by mouth once daily.      naproxen (NAPROSYN) 500 MG tablet Take 1 tablet (500 mg total) by mouth 2 (two) times daily. 30 tablet 0    omeprazole (PRILOSEC) 40 MG capsule Take 40 mg by mouth.      ondansetron (ZOFRAN-ODT) 4 MG TbDL Take 2 tablets (8 mg total) by mouth every 8 (eight) hours as needed (nausea). 15 tablet 0    propranoloL (INDERAL) 40 MG tablet Take 20  "mg by mouth 2 (two) times daily.       QUEtiapine (SEROQUEL) 400 MG tablet       tiZANidine (ZANAFLEX) 4 MG tablet 1-2 po qhs 60 tablet 3     No current facility-administered medications for this visit.     Facility-Administered Medications Ordered in Other Visits   Medication Dose Route Frequency Provider Last Rate Last Admin    0.9%  NaCl infusion   Intravenous Continuous Triston Naidu IV, MD        lidocaine HCL 10 mg/ml (1%) injection 1 mL  1 mL Intradermal Once PRN Triston Naidu IV, MD            The patient's relevant past medical, surgical, and social history was reviewed in Epic.       Objective:      VITAL SIGNS: /74   Pulse 101   Ht 5' 7" (1.702 m)   Wt 103.9 kg (229 lb 0.9 oz)   BMI 35.88 kg/m²     General    Nursing note and vitals reviewed.  Constitutional: She is oriented to person, place, and time. She appears well-developed and well-nourished.   Neurological: She is alert and oriented to person, place, and time.             Left Knee Exam     Inspection   Swelling: present    Tenderness   The patient tender to palpation of the medial joint line.    Range of Motion   Extension:  abnormal   Flexion:  abnormal     Tests   Meniscus   [place]:  Medial - negative     Muscle Strength   Left Lower Extremity   Quadriceps:  4/5            Assessment:       1. Primary osteoarthritis of left knee          Plan:         Taty was seen today for pain.    Diagnoses and all orders for this visit:    Primary osteoarthritis of left knee  -     Large Joint Aspiration/Injection: L knee    Left knee arthritis. Possible medial meniscus tear. Patient is not interested in MRI for confirmation at this time. Would like repeat CS injection which is reasonable. Unsure if she went to PT. Would have notes in [facility] system if she went. States she went to [facility].     Diagnoses and plan discussed with the patient, as well as the expected course and duration of his symptoms.  All questions and concerns were " addressed prior to the end of the visit.   Instructed patient to call office if they have any future questions/concerns or to schedule apt. Patient will return to see me if symptoms worsen or fail to improve    Note dictated with voice recognition software, please excuse any grammatical errors.        Nikki Dukse PA-C   08/02/2023

## 2023-08-14 ENCOUNTER — TELEPHONE (OUTPATIENT)
Dept: ORTHOPEDICS | Facility: CLINIC | Age: 63
End: 2023-08-14
Payer: MEDICAID

## 2023-08-14 DIAGNOSIS — M17.12 PRIMARY OSTEOARTHRITIS OF LEFT KNEE: ICD-10-CM

## 2023-08-14 DIAGNOSIS — M23.92 INTERNAL DERANGEMENT OF LEFT KNEE: Primary | ICD-10-CM

## 2023-08-14 DIAGNOSIS — M62.81 QUADRICEPS WEAKNESS: ICD-10-CM

## 2023-08-14 NOTE — TELEPHONE ENCOUNTER
----- Message from Shalini Beauchamp sent at 8/14/2023  1:19 PM CDT -----  Type:  Patient Returning Call    Who Called:Pt   Would the patient rather a call back or a response via [facility]? Call back   Best Call Back Number:[phone]  Additional Information: Calling to schedule MRI ... No orders

## 2023-08-24 ENCOUNTER — HOSPITAL ENCOUNTER (OUTPATIENT)
Dept: RADIOLOGY | Facility: HOSPITAL | Age: 63
Discharge: HOME | End: 2023-08-24
Attending: PHYSICIAN ASSISTANT
Payer: MEDICAID

## 2023-08-24 DIAGNOSIS — M17.12 PRIMARY OSTEOARTHRITIS OF LEFT KNEE: ICD-10-CM

## 2023-08-24 DIAGNOSIS — M62.81 QUADRICEPS WEAKNESS: ICD-10-CM

## 2023-08-24 DIAGNOSIS — M23.92 INTERNAL DERANGEMENT OF LEFT KNEE: ICD-10-CM

## 2023-08-24 PROCEDURE — 73721 MRI KNEE WITHOUT CONTRAST LEFT: ICD-10-PCS | Mod: 26,LT,, | Performed by: RADIOLOGY

## 2023-08-24 PROCEDURE — 73721 MRI JNT OF LWR EXTRE W/O DYE: CPT | Mod: 26,LT,, | Performed by: RADIOLOGY

## 2023-08-24 PROCEDURE — 73721 MRI JNT OF LWR EXTRE W/O DYE: CPT | Mod: TC,LT

## 2023-08-25 ENCOUNTER — OFFICE VISIT (OUTPATIENT)
Dept: ORTHOPEDICS | Facility: CLINIC | Age: 63
End: 2023-08-25
Payer: MEDICAID

## 2023-08-25 ENCOUNTER — TELEPHONE (OUTPATIENT)
Dept: ORTHOPEDICS | Facility: CLINIC | Age: 63
End: 2023-08-25

## 2023-08-25 DIAGNOSIS — M25.562 CHRONIC PAIN OF LEFT KNEE: ICD-10-CM

## 2023-08-25 DIAGNOSIS — G89.29 CHRONIC PAIN OF LEFT KNEE: ICD-10-CM

## 2023-08-25 DIAGNOSIS — S83.512A RUPTURE OF ANTERIOR CRUCIATE LIGAMENT OF LEFT KNEE, INITIAL ENCOUNTER: ICD-10-CM

## 2023-08-25 DIAGNOSIS — M17.12 PRIMARY OSTEOARTHRITIS OF LEFT KNEE: Primary | ICD-10-CM

## 2023-08-25 PROCEDURE — 1160F PR REVIEW ALL MEDS BY PRESCRIBER/CLIN PHARMACIST DOCUMENTED: ICD-10-PCS | Mod: CPTII,95,, | Performed by: PHYSICIAN ASSISTANT

## 2023-08-25 PROCEDURE — 1159F PR MEDICATION LIST DOCUMENTED IN MEDICAL RECORD: ICD-10-PCS | Mod: CPTII,95,, | Performed by: PHYSICIAN ASSISTANT

## 2023-08-25 PROCEDURE — 1160F RVW MEDS BY RX/DR IN RCRD: CPT | Mod: CPTII,95,, | Performed by: PHYSICIAN ASSISTANT

## 2023-08-25 PROCEDURE — 1159F MED LIST DOCD IN RCRD: CPT | Mod: CPTII,95,, | Performed by: PHYSICIAN ASSISTANT

## 2023-08-25 PROCEDURE — 99441 PR PHYSICIAN TELEPHONE EVALUATION 5-10 MIN: CPT | Mod: 95,,, | Performed by: PHYSICIAN ASSISTANT

## 2023-08-25 PROCEDURE — 99441 PR PHYSICIAN TELEPHONE EVALUATION 5-10 MIN: ICD-10-PCS | Mod: 95,,, | Performed by: PHYSICIAN ASSISTANT

## 2023-08-25 NOTE — PROGRESS NOTES
Established Patient - Audio Only Telehealth Visit     The patient location is: home  The chief complaint leading to consultation is: MRI results left knee  Visit type: Virtual visit with audio only (telephone)  Total time spent with patient: 6 min        The reason for the audio only service rather than synchronous audio and video virtual visit was related to technical difficulties or patient preference/necessity.     Each patient to whom I provide medical services by telemedicine is:  (1) informed of the relationship between the physician and patient and the respective role of any other health care provider with respect to management of the patient; and (2) notified that they may decline to receive medical services by telemedicine and may withdraw from such care at any time. Patient verbally consented to receive this service via voice-only telephone call.       HPI: 64yo female with left knee pain since last year. Had a trip and fall incident. Pain is medially. Worse with ambulation. Denied mechanical symptoms at last visit. Was getting relief from CS injections, but last one helped minimally. She originally declined ordering a left knee MRI but decided to order one once last CS injection did not help.     MRI Knee Without Contrast Left  Narrative: EXAMINATION:  MRI KNEE WITHOUT CONTRAST LEFT    CLINICAL HISTORY:  Knee trauma, internal derangement suspected, xray done;    TECHNIQUE:  Routine left MRI knee protocol performed without IV contrast.    COMPARISON:  None    FINDINGS:  Cruciate Ligament: There is a proximal ACL tear, noting a few intact fibers and/or scar tissue may be present (series 8, images 14-15).  The PCL is intact.    Collateral ligaments: The lateral collateral ligament complex is intact.The MCL is intact.    Menisci: The medial meniscus is intact.There is a complex posterior horn lateral meniscal tear with a probable full-thickness radial component (series 8, image 6 and series 9, image 26).  The  menisci as partially extruded from the joint space.  No flipped fragment identified.    Extensor Mechanism: Intact.   No prepatellar fluid collections/bursitis.    Cartilage:    Patellofemoral: Mild cartilage fissuring with trace amount of subchondral marrow change.    Medial tibiofemoral: Generalized thinning.    Lateral tibiofemoral: Large area of partial to full-thickness cartilage loss involving the weight-bearing medial femoral condyle and opposing tibial plateau with prominent subchondral marrow edema (series 500, images 15-21).    Moderate-sized joint effusion.  Small popliteal cyst.    Bones: No fractures.  No avascular necrosis.  No marrow replacement process.    The muscle signal is within normal limits.  No atrophy or strain.  No fluid collections.  Impression: Complex lateral meniscal tear, as above.    Large area of cartilage loss with prominent subchondral marrow edema in the lateral compartment, as above.    Moderate-sized joint effusion.    Proximal ACL tear, as above.    This report was flagged in Epic as abnormal.    Electronically signed by: Jaun Koenig MD  Date:    08/24/2023  Time:    12:05       Assessment and plan:    MRI reveals almost bone on bone lateral compartment arthritis with ACL tear. Discussed with the patient that we will treat this as arthritis since ACL recon is not recommended in the setting of severe arthritis. Patient is also repeating instability in her knee. I recommend physical therapy to help with stabilization. She would like to go to [place].  Then discussed next options in injection therapy for pain relief. Discussed gel injections vs Zilretta injection. After discussing both in detail, she would like to try gel HA injection. Will order Gelsyn3 injections. She will return to clinic once injections are approved.    Patient agreed with the plan.  To note, Dr. Mccabe reviewed Mri with me and agrees with the findings.                      This service was not originating from  a related E/M service provided within the previous 7 days nor will  to an E/M service or procedure within the next 24 hours or my soonest available appointment.  Prevailing standard of care was able to be met in this audio-only visit.

## 2023-08-29 ENCOUNTER — TELEPHONE (OUTPATIENT)
Dept: ORTHOPEDICS | Facility: CLINIC | Age: 63
End: 2023-08-29
Payer: MEDICAID

## 2023-08-29 NOTE — TELEPHONE ENCOUNTER
----- Message from Teagan Vasquez sent at 8/29/2023 10:33 AM CDT -----  Type:  appt access    Who Called:[facility]   Does the patient know what this is regarding?:checking to see why pt's virtual appt on 08/28 was canceled and not r/s  Would the patient rather a call back or a response via [facility]? Call   Best Call Back Number:  Additional Information:

## 2023-09-08 ENCOUNTER — OFFICE VISIT (OUTPATIENT)
Dept: ORTHOPEDICS | Facility: CLINIC | Age: 63
End: 2023-09-08
Payer: MEDICAID

## 2023-09-08 VITALS
SYSTOLIC BLOOD PRESSURE: 123 MMHG | HEART RATE: 75 BPM | BODY MASS INDEX: 35.95 KG/M2 | DIASTOLIC BLOOD PRESSURE: 70 MMHG | WEIGHT: 229.06 LBS | HEIGHT: 67 IN

## 2023-09-08 DIAGNOSIS — M17.12 PRIMARY OSTEOARTHRITIS OF LEFT KNEE: Primary | ICD-10-CM

## 2023-09-08 PROCEDURE — 99499 NO LOS: ICD-10-PCS | Mod: S$PBB,,, | Performed by: PHYSICIAN ASSISTANT

## 2023-09-08 PROCEDURE — 20610 DRAIN/INJ JOINT/BURSA W/O US: CPT | Mod: PBBFAC,PN,LT | Performed by: PHYSICIAN ASSISTANT

## 2023-09-08 PROCEDURE — 20610 DRAIN/INJ JOINT/BURSA W/O US: CPT | Mod: S$PBB,LT,, | Performed by: PHYSICIAN ASSISTANT

## 2023-09-08 PROCEDURE — 99499 UNLISTED E&M SERVICE: CPT | Mod: S$PBB,,, | Performed by: PHYSICIAN ASSISTANT

## 2023-09-08 PROCEDURE — 99999PBSHW PR PBB SHADOW TECHNICAL ONLY FILED TO HB: ICD-10-PCS | Mod: JZ,PBBFAC,,

## 2023-09-08 PROCEDURE — 99213 OFFICE O/P EST LOW 20 MIN: CPT | Mod: PBBFAC,PN | Performed by: PHYSICIAN ASSISTANT

## 2023-09-08 PROCEDURE — 20610 LARGE JOINT ASPIRATION/INJECTION: L KNEE: ICD-10-PCS | Mod: S$PBB,LT,, | Performed by: PHYSICIAN ASSISTANT

## 2023-09-08 PROCEDURE — 99999 PR PBB SHADOW E&M-EST. PATIENT-LVL III: CPT | Mod: PBBFAC,,, | Performed by: PHYSICIAN ASSISTANT

## 2023-09-08 PROCEDURE — 99999 PR PBB SHADOW E&M-EST. PATIENT-LVL III: ICD-10-PCS | Mod: PBBFAC,,, | Performed by: PHYSICIAN ASSISTANT

## 2023-09-08 PROCEDURE — 99999PBSHW PR PBB SHADOW TECHNICAL ONLY FILED TO HB: Mod: JZ,PBBFAC,,

## 2023-09-08 RX ADMIN — Medication 16.8 MG: at 10:09

## 2023-09-08 NOTE — PROCEDURES
Large Joint Aspiration/Injection: L knee    Date/Time: 9/8/2023 10:15 AM    Performed by: Nikki Dukes PA-C  Authorized by: Nikki Dukes PA-C    Consent Done?:  Yes (Verbal)  Indications:  Pain  Site marked: the procedure site was marked    Timeout: prior to procedure the correct patient, procedure, and site was verified    Prep: patient was prepped and draped in usual sterile fashion    Local anesthesia used?: No    Local anesthetic:  Topical anesthetic    Details:  Needle Size:  22 G  Ultrasonic Guidance for needle placement?: No    Approach:  Anterolateral  Location:  Knee  Site:  L knee  Medications:  16.8 mg sodium hyaluronate 16.8 mg/2 mL  Patient tolerance:  Patient tolerated the procedure well with no immediate complications

## 2023-09-08 NOTE — PROGRESS NOTES
Subjective:      Patient ID: Taty Kenney is a 63 y.o. female.    Chief Complaint: Pain and Injections of the Left Knee      Patient is here for First Gelsyn3 injection(s) for left knee osteoarthritis. Patient tolerated last injection well. No new complaints today. Has not heard from PT.           Review of Systems   Constitutional: Negative for chills and fever.   Cardiovascular:  Negative for chest pain.   Respiratory:  Negative for cough.    Hematologic/Lymphatic: Does not bruise/bleed easily.   Skin:  Negative for poor wound healing and rash.   Musculoskeletal:  Positive for arthritis, joint pain, myalgias and stiffness.   Gastrointestinal:  Negative for abdominal pain.   Genitourinary:  Negative for bladder incontinence.   Neurological:  Negative for dizziness, loss of balance and weakness.   Psychiatric/Behavioral:  Negative for altered mental status.        Review of patient's allergies indicates:   Allergen Reactions    Augmentin [amoxicillin-pot clavulanate] Other (See Comments)     Stomach cramps        Current Outpatient Medications   Medication Sig Dispense Refill    ALPRAZolam (XANAX) 1 MG tablet Take 1 mg by mouth daily as needed.      aspirin (ECOTRIN) 81 MG EC tablet Take 1 tablet (81 mg total) by mouth once daily. 30 tablet 0    FLUoxetine 40 MG capsule Take 40 mg by mouth.      hydroCHLOROthiazide (HYDRODIURIL) 25 MG tablet Take 25 mg by mouth once daily.      naproxen (NAPROSYN) 500 MG tablet Take 1 tablet (500 mg total) by mouth 2 (two) times daily. 30 tablet 0    omeprazole (PRILOSEC) 40 MG capsule Take 40 mg by mouth.      ondansetron (ZOFRAN-ODT) 4 MG TbDL Take 2 tablets (8 mg total) by mouth every 8 (eight) hours as needed (nausea). 15 tablet 0    propranoloL (INDERAL) 40 MG tablet Take 20 mg by mouth 2 (two) times daily.       QUEtiapine (SEROQUEL) 400 MG tablet       tiZANidine (ZANAFLEX) 4 MG tablet 1-2 po qhs 60 tablet 3     No current facility-administered medications for this  "visit.     Facility-Administered Medications Ordered in Other Visits   Medication Dose Route Frequency Provider Last Rate Last Admin    0.9%  NaCl infusion   Intravenous Continuous Triston Naidu IV, MD        lidocaine HCL 10 mg/ml (1%) injection 1 mL  1 mL Intradermal Once PRN Triston Naidu IV, MD            The patient's relevant past medical, surgical, and social history was reviewed in Epic.       Objective:      VITAL SIGNS: /70   Pulse 75   Ht 5' 7" (1.702 m)   Wt 103.9 kg (229 lb 0.9 oz)   BMI 35.88 kg/m²     Ortho/SPM Exam         Assessment:       1. Primary osteoarthritis of left knee          Plan:         Taty was seen today for pain and injections.    Diagnoses and all orders for this visit:    Primary osteoarthritis of left knee  -     Large Joint Aspiration/Injection: L knee    I injected the left knee with one dose of Gelsyn3 today.  We will see the patient back next week or as needed.      Diagnoses and plan discussed with the patient, as well as the expected course and duration of his symptoms.  All questions and concerns were addressed prior to the end of the visit.   Instructed patient to call office if they have any future questions/concerns or to schedule apt. Patient will return to see me if symptoms worsen or fail to improve    Note dictated with voice recognition software, please excuse any grammatical errors.        Nikki Dukes PA-C   09/08/2023    "

## 2023-09-15 ENCOUNTER — OFFICE VISIT (OUTPATIENT)
Dept: ORTHOPEDICS | Facility: CLINIC | Age: 63
End: 2023-09-15
Payer: MEDICAID

## 2023-09-15 VITALS
BODY MASS INDEX: 35.95 KG/M2 | HEIGHT: 67 IN | HEART RATE: 83 BPM | DIASTOLIC BLOOD PRESSURE: 85 MMHG | SYSTOLIC BLOOD PRESSURE: 137 MMHG | WEIGHT: 229.06 LBS

## 2023-09-15 DIAGNOSIS — S83.512A RUPTURE OF ANTERIOR CRUCIATE LIGAMENT OF LEFT KNEE, INITIAL ENCOUNTER: ICD-10-CM

## 2023-09-15 DIAGNOSIS — M25.562 CHRONIC PAIN OF LEFT KNEE: ICD-10-CM

## 2023-09-15 DIAGNOSIS — M17.12 PRIMARY OSTEOARTHRITIS OF LEFT KNEE: ICD-10-CM

## 2023-09-15 DIAGNOSIS — G89.29 CHRONIC PAIN OF LEFT KNEE: ICD-10-CM

## 2023-09-15 PROCEDURE — 99499 UNLISTED E&M SERVICE: CPT | Mod: S$PBB,,, | Performed by: PHYSICIAN ASSISTANT

## 2023-09-15 PROCEDURE — 99999PBSHW PR PBB SHADOW TECHNICAL ONLY FILED TO HB: ICD-10-PCS | Mod: JZ,PBBFAC,,

## 2023-09-15 PROCEDURE — 99999 PR PBB SHADOW E&M-EST. PATIENT-LVL III: ICD-10-PCS | Mod: PBBFAC,,, | Performed by: PHYSICIAN ASSISTANT

## 2023-09-15 PROCEDURE — 99213 OFFICE O/P EST LOW 20 MIN: CPT | Mod: PBBFAC,PN,25 | Performed by: PHYSICIAN ASSISTANT

## 2023-09-15 PROCEDURE — 20610 DRAIN/INJ JOINT/BURSA W/O US: CPT | Mod: S$PBB,LT,, | Performed by: PHYSICIAN ASSISTANT

## 2023-09-15 PROCEDURE — 20610 DRAIN/INJ JOINT/BURSA W/O US: CPT | Mod: PBBFAC,PN,LT | Performed by: PHYSICIAN ASSISTANT

## 2023-09-15 PROCEDURE — 99999 PR PBB SHADOW E&M-EST. PATIENT-LVL III: CPT | Mod: PBBFAC,,, | Performed by: PHYSICIAN ASSISTANT

## 2023-09-15 PROCEDURE — 99999PBSHW PR PBB SHADOW TECHNICAL ONLY FILED TO HB: Mod: JZ,PBBFAC,,

## 2023-09-15 PROCEDURE — 20610 LARGE JOINT ASPIRATION/INJECTION: L KNEE: ICD-10-PCS | Mod: S$PBB,LT,, | Performed by: PHYSICIAN ASSISTANT

## 2023-09-15 PROCEDURE — 99499 NO LOS: ICD-10-PCS | Mod: S$PBB,,, | Performed by: PHYSICIAN ASSISTANT

## 2023-09-15 RX ADMIN — Medication 16.8 MG: at 10:09

## 2023-09-15 NOTE — PROCEDURES
Large Joint Aspiration/Injection: L knee    Date/Time: 9/15/2023 10:30 AM    Performed by: Nikki Dukes PA-C  Authorized by: Nikki Dukes PA-C    Consent Done?:  Yes (Verbal)  Indications:  Pain  Site marked: the procedure site was marked    Timeout: prior to procedure the correct patient, procedure, and site was verified    Prep: patient was prepped and draped in usual sterile fashion    Local anesthesia used?: No    Local anesthetic:  Topical anesthetic    Details:  Needle Size:  22 G  Ultrasonic Guidance for needle placement?: No    Approach:  Anterolateral  Location:  Knee  Site:  L knee  Medications:  16.8 mg sodium hyaluronate 16.8 mg/2 mL  Patient tolerance:  Patient tolerated the procedure well with no immediate complications

## 2023-09-15 NOTE — PROGRESS NOTES
Subjective:      Patient ID: Taty Kenney is a 63 y.o. female.    Chief Complaint: Pain and Injections of the Left Knee      Patient is here for Second Gelsyn3 injection(s) for left knee osteoarthritis. Patient tolerated last injection well. No relief so far. No new complaints today.           Review of Systems   Constitutional: Negative for chills and fever.   Cardiovascular:  Negative for chest pain.   Respiratory:  Negative for cough.    Hematologic/Lymphatic: Does not bruise/bleed easily.   Skin:  Negative for poor wound healing and rash.   Musculoskeletal:  Positive for arthritis, joint pain, myalgias and stiffness.   Gastrointestinal:  Negative for abdominal pain.   Genitourinary:  Negative for bladder incontinence.   Neurological:  Negative for dizziness, loss of balance and weakness.   Psychiatric/Behavioral:  Negative for altered mental status.        Review of patient's allergies indicates:   Allergen Reactions    Augmentin [amoxicillin-pot clavulanate] Other (See Comments)     Stomach cramps        Current Outpatient Medications   Medication Sig Dispense Refill    ALPRAZolam (XANAX) 1 MG tablet Take 1 mg by mouth daily as needed.      aspirin (ECOTRIN) 81 MG EC tablet Take 1 tablet (81 mg total) by mouth once daily. 30 tablet 0    FLUoxetine 40 MG capsule Take 40 mg by mouth.      hydroCHLOROthiazide (HYDRODIURIL) 25 MG tablet Take 25 mg by mouth once daily.      naproxen (NAPROSYN) 500 MG tablet Take 1 tablet (500 mg total) by mouth 2 (two) times daily. 30 tablet 0    omeprazole (PRILOSEC) 40 MG capsule Take 40 mg by mouth.      ondansetron (ZOFRAN-ODT) 4 MG TbDL Take 2 tablets (8 mg total) by mouth every 8 (eight) hours as needed (nausea). 15 tablet 0    propranoloL (INDERAL) 40 MG tablet Take 20 mg by mouth 2 (two) times daily.       QUEtiapine (SEROQUEL) 400 MG tablet       tiZANidine (ZANAFLEX) 4 MG tablet 1-2 po qhs 60 tablet 3     No current facility-administered medications for this visit.  "    Facility-Administered Medications Ordered in Other Visits   Medication Dose Route Frequency Provider Last Rate Last Admin    0.9%  NaCl infusion   Intravenous Continuous Triston Naidu IV, MD        lidocaine HCL 10 mg/ml (1%) injection 1 mL  1 mL Intradermal Once PRN Triston Naidu IV, MD            The patient's relevant past medical, surgical, and social history was reviewed in Epic.       Objective:      VITAL SIGNS: /85   Pulse 83   Ht 5' 7" (1.702 m)   Wt 103.9 kg (229 lb 0.9 oz)   BMI 35.88 kg/m²     Ortho/SPM Exam         Assessment:       1. Primary osteoarthritis of left knee    2. Rupture of anterior cruciate ligament of left knee, initial encounter    3. Chronic pain of left knee          Plan:         Taty was seen today for pain and injections.    Diagnoses and all orders for this visit:    Primary osteoarthritis of left knee  -     Ambulatory referral/consult to Physical/Occupational Therapy  -     Large Joint Aspiration/Injection: L knee    Rupture of anterior cruciate ligament of left knee, initial encounter  -     Ambulatory referral/consult to Physical/Occupational Therapy    Chronic pain of left knee  -     Ambulatory referral/consult to Physical/Occupational Therapy    I injected the left knee with one dose of Gelsyn3 today.  We will see the patient back next week or as needed.      Diagnoses and plan discussed with the patient, as well as the expected course and duration of his symptoms.  All questions and concerns were addressed prior to the end of the visit.   Instructed patient to call office if they have any future questions/concerns or to schedule apt. Patient will return to see me if symptoms worsen or fail to improve    Note dictated with voice recognition software, please excuse any grammatical errors.        Nikki Dukes PA-C   09/15/2023    "

## 2023-09-28 ENCOUNTER — OFFICE VISIT (OUTPATIENT)
Dept: ORTHOPEDICS | Facility: CLINIC | Age: 63
End: 2023-09-28
Payer: MEDICAID

## 2023-09-28 VITALS
SYSTOLIC BLOOD PRESSURE: 127 MMHG | BODY MASS INDEX: 35.95 KG/M2 | HEIGHT: 67 IN | DIASTOLIC BLOOD PRESSURE: 85 MMHG | WEIGHT: 229.06 LBS | HEART RATE: 89 BPM

## 2023-09-28 DIAGNOSIS — M17.12 PRIMARY OSTEOARTHRITIS OF LEFT KNEE: Primary | ICD-10-CM

## 2023-09-28 PROCEDURE — 99999 PR PBB SHADOW E&M-EST. PATIENT-LVL III: ICD-10-PCS | Mod: PBBFAC,,, | Performed by: PHYSICIAN ASSISTANT

## 2023-09-28 PROCEDURE — 99999PBSHW PR PBB SHADOW TECHNICAL ONLY FILED TO HB: Mod: JZ,PBBFAC,,

## 2023-09-28 PROCEDURE — 20610 LARGE JOINT ASPIRATION/INJECTION: L KNEE: ICD-10-PCS | Mod: S$PBB,LT,, | Performed by: PHYSICIAN ASSISTANT

## 2023-09-28 PROCEDURE — 99999PBSHW PR PBB SHADOW TECHNICAL ONLY FILED TO HB: ICD-10-PCS | Mod: JZ,PBBFAC,,

## 2023-09-28 PROCEDURE — 20610 DRAIN/INJ JOINT/BURSA W/O US: CPT | Mod: PBBFAC,PN,LT | Performed by: PHYSICIAN ASSISTANT

## 2023-09-28 PROCEDURE — 20610 DRAIN/INJ JOINT/BURSA W/O US: CPT | Mod: S$PBB,LT,, | Performed by: PHYSICIAN ASSISTANT

## 2023-09-28 PROCEDURE — 99499 UNLISTED E&M SERVICE: CPT | Mod: S$PBB,,, | Performed by: PHYSICIAN ASSISTANT

## 2023-09-28 PROCEDURE — 99999 PR PBB SHADOW E&M-EST. PATIENT-LVL III: CPT | Mod: PBBFAC,,, | Performed by: PHYSICIAN ASSISTANT

## 2023-09-28 PROCEDURE — 99499 NO LOS: ICD-10-PCS | Mod: S$PBB,,, | Performed by: PHYSICIAN ASSISTANT

## 2023-09-28 PROCEDURE — 99213 OFFICE O/P EST LOW 20 MIN: CPT | Mod: PBBFAC,PN | Performed by: PHYSICIAN ASSISTANT

## 2023-09-28 RX ADMIN — Medication 16.8 MG: at 09:09

## 2023-09-28 NOTE — PROCEDURES
Large Joint Aspiration/Injection: L knee    Date/Time: 9/28/2023 9:00 AM    Performed by: Nikki Dukes PA-C  Authorized by: Nikki Dukes PA-C    Consent Done?:  Yes (Verbal)  Indications:  Pain  Site marked: the procedure site was marked    Timeout: prior to procedure the correct patient, procedure, and site was verified    Prep: patient was prepped and draped in usual sterile fashion    Local anesthesia used?: No    Local anesthetic:  Topical anesthetic    Details:  Needle Size:  22 G  Ultrasonic Guidance for needle placement?: No    Approach:  Anterolateral  Location:  Knee  Site:  L knee  Medications:  16.8 mg sodium hyaluronate 16.8 mg/2 mL  Patient tolerance:  Patient tolerated the procedure well with no immediate complications

## 2023-09-28 NOTE — PROGRESS NOTES
Subjective:      Patient ID: Taty Kenney is a 63 y.o. female.    Chief Complaint: Pain and Injections of the Left Knee      Patient is here for Third Gelsyn3 injection(s) for left knee osteoarthritis.  Deferred PT. Not noticing much improvement. In wheelchair.           Review of Systems   Constitutional: Negative for chills and fever.   Cardiovascular:  Negative for chest pain.   Respiratory:  Negative for cough.    Hematologic/Lymphatic: Does not bruise/bleed easily.   Skin:  Negative for poor wound healing and rash.   Musculoskeletal:  Positive for arthritis, joint pain, myalgias and stiffness.   Gastrointestinal:  Negative for abdominal pain.   Genitourinary:  Negative for bladder incontinence.   Neurological:  Negative for dizziness, loss of balance and weakness.   Psychiatric/Behavioral:  Negative for altered mental status.        Review of patient's allergies indicates:   Allergen Reactions    Augmentin [amoxicillin-pot clavulanate] Other (See Comments)     Stomach cramps        Current Outpatient Medications   Medication Sig Dispense Refill    ALPRAZolam (XANAX) 1 MG tablet Take 1 mg by mouth daily as needed.      aspirin (ECOTRIN) 81 MG EC tablet Take 1 tablet (81 mg total) by mouth once daily. 30 tablet 0    FLUoxetine 40 MG capsule Take 40 mg by mouth.      hydroCHLOROthiazide (HYDRODIURIL) 25 MG tablet Take 25 mg by mouth once daily.      naproxen (NAPROSYN) 500 MG tablet Take 1 tablet (500 mg total) by mouth 2 (two) times daily. 30 tablet 0    omeprazole (PRILOSEC) 40 MG capsule Take 40 mg by mouth.      ondansetron (ZOFRAN-ODT) 4 MG TbDL Take 2 tablets (8 mg total) by mouth every 8 (eight) hours as needed (nausea). 15 tablet 0    propranoloL (INDERAL) 40 MG tablet Take 20 mg by mouth 2 (two) times daily.       QUEtiapine (SEROQUEL) 400 MG tablet       tiZANidine (ZANAFLEX) 4 MG tablet 1-2 po qhs 60 tablet 3     No current facility-administered medications for this visit.  "    Facility-Administered Medications Ordered in Other Visits   Medication Dose Route Frequency Provider Last Rate Last Admin    0.9%  NaCl infusion   Intravenous Continuous Triston Naidu IV, MD        lidocaine HCL 10 mg/ml (1%) injection 1 mL  1 mL Intradermal Once PRN Triston Naidu IV, MD            The patient's relevant past medical, surgical, and social history was reviewed in Epic.       Objective:      VITAL SIGNS: Ht 5' 7" (1.702 m)   BMI 35.88 kg/m²     Ortho/SPM Exam         Assessment:       1. Primary osteoarthritis of left knee          Plan:         Taty was seen today for pain and injections.    Diagnoses and all orders for this visit:    Primary osteoarthritis of left knee  -     Large Joint Aspiration/Injection: L knee    I injected the left knee with one dose of Gelsyn3 today.  We will see the patient back in 6 mos or as needed.  Consider iopatricka next or refer to dr. Mccabe for further eval.     Diagnoses and plan discussed with the patient, as well as the expected course and duration of his symptoms.  All questions and concerns were addressed prior to the end of the visit.   Instructed patient to call office if they have any future questions/concerns or to schedule apt. Patient will return to see me if symptoms worsen or fail to improve    Note dictated with voice recognition software, please excuse any grammatical errors.        Nikki Dukes PA-C   09/28/2023    "

## 2023-10-25 NOTE — PLAN OF CARE
Meets criteria for discharge from PACU. BRIAN franco. Pain   5/10, Report to Dr Doherty         Satisfactory

## 2023-11-21 NOTE — ANESTHESIA PREPROCEDURE EVALUATION
07/24/2020  Taty Kenney is a 60 y.o., female scheduled for left ankle debridement under GETA/RE.    Past Medical History:   Diagnosis Date    Anxiety     Depression     Hypertension      Past Surgical History:   Procedure Laterality Date    HYSTERECTOMY      OPEN REDUCTION AND INTERNAL FIXATION (ORIF) OF INJURY OF ANKLE Left 7/7/2020    Procedure: ORIF, ANKLE;  Surgeon: Triston Naidu IV, MD;  Location: [facility];  Service: Orthopedics;  Laterality: Left;  GENERAL + REGIONAL  ORTHO MAJOR TRAY  BONE FOAM RAMP  FLUOROSCOPY  Hair Lopez confirmed CW 7/6     Pre-op Assessment    I have reviewed the Patient Summary Reports.     I have reviewed the Nursing Notes. I have reviewed the NPO Status.   I have reviewed the Medications.     Review of Systems  Anesthesia Hx:  Hx of Anesthetic complications PONV Denies Family Hx of Anesthesia complications.    Social:  Former Smoker, No Alcohol Use    Hematology/Oncology:  Hematology Normal        Cardiovascular:   Exercise tolerance: good Hypertension  Denies Angina.     States she takes propanolol for HTN   Pulmonary:  Pulmonary Normal  Denies Shortness of breath.    Renal/:  Renal/ Normal     Hepatic/GI:  Hepatic/GI Normal    Neurological:  Neurology Normal    Endocrine:  Endocrine Normal    Psych:   depression          Physical Exam  General:  Well nourished    Airway/Jaw/Neck:  Airway Findings: Mouth Opening: Normal Tongue: Normal  General Airway Assessment: Adult  Mallampati: II       Chest/Lungs:  Chest/Lungs Findings: Clear to auscultation, Normal Respiratory Rate     Heart/Vascular:  Heart Findings: Rate: Normal  Sounds: Normal        Mental Status:  Mental Status Findings:  Cooperative, Alert and Oriented         Anesthesia Plan  Type of Anesthesia, risks & benefits discussed:  Anesthesia Type:  general, regional  Patient's Preference:  A1c has improved to 8.4%.  Previously at 13%.  Doing much better job at scanning on the Freestyle Yoav.  Glucose much better.  Still with highs after meals.      Given improvement in glycemic control, I would continue current regimen.  Do not change    HUMALOG to 12 units 3 times a day before meals do not skip.  Unless not eating        Goal blood sugar in the morning, before breakfast:   Glucose goal AFTER MEALS:    2 Hour after: less than 140  Before going to bed: 100-140  Do not go to bed with glucose less than 100  Have a small snack if glucose is lower than 100    Continue glucose monitor  Freestyle Yoav monitor  Remember to scan to check your blood sugar regularly 4 to 5  times a day  When you wake up and before bed and every 4-6 hours while awake (before each meals)  THE MORE YOU SCAN MORE INFORMATION I GET.      We will plan an in-clinic visit in 4 months, with labs prior to that appointment.    Contact information:  Dominick Rahman M.D  [facility] [address], [address]  [place] [address]    Office:  [phone]  Fax:  [phone]     ----------------------------------------------------------      Intra-op Monitoring Plan: standard ASA monitors  Intra-op Monitoring Plan Comments:   Post Op Pain Control Plan: multimodal analgesia  Post Op Pain Control Plan Comments:   Induction:   IV  Beta Blocker:  Patient is on a Beta-Blocker and has received one dose within the past 24 hours (No further documentation required).       Informed Consent: Patient understands risks and agrees with Anesthesia plan.  Questions answered. Anesthesia consent signed with patient.  ASA Score: 2     Day of Surgery Review of History & Physical:        Anesthesia Plan Notes:           Ready For Surgery From Anesthesia Perspective.

## 2023-12-20 ENCOUNTER — OFFICE VISIT (OUTPATIENT)
Dept: ORTHOPEDICS | Facility: CLINIC | Age: 63
End: 2023-12-20
Payer: MEDICAID

## 2023-12-20 VITALS
SYSTOLIC BLOOD PRESSURE: 132 MMHG | WEIGHT: 229.06 LBS | DIASTOLIC BLOOD PRESSURE: 84 MMHG | HEIGHT: 67 IN | HEART RATE: 80 BPM | BODY MASS INDEX: 35.95 KG/M2

## 2023-12-20 DIAGNOSIS — M17.12 PRIMARY OSTEOARTHRITIS OF LEFT KNEE: Primary | ICD-10-CM

## 2023-12-20 DIAGNOSIS — S83.512S RUPTURE OF ANTERIOR CRUCIATE LIGAMENT OF LEFT KNEE, SEQUELA: ICD-10-CM

## 2023-12-20 PROCEDURE — 99999 PR PBB SHADOW E&M-EST. PATIENT-LVL III: CPT | Mod: PBBFAC,,, | Performed by: PHYSICIAN ASSISTANT

## 2023-12-20 PROCEDURE — 99999 PR PBB SHADOW E&M-EST. PATIENT-LVL III: ICD-10-PCS | Mod: PBBFAC,,, | Performed by: PHYSICIAN ASSISTANT

## 2023-12-20 PROCEDURE — 20610 DRAIN/INJ JOINT/BURSA W/O US: CPT | Mod: S$PBB,LT,, | Performed by: PHYSICIAN ASSISTANT

## 2023-12-20 PROCEDURE — 99999PBSHW PR PBB SHADOW TECHNICAL ONLY FILED TO HB: Mod: PBBFAC,,,

## 2023-12-20 PROCEDURE — 99213 OFFICE O/P EST LOW 20 MIN: CPT | Mod: PBBFAC,25,PN | Performed by: PHYSICIAN ASSISTANT

## 2023-12-20 PROCEDURE — 99213 OFFICE O/P EST LOW 20 MIN: CPT | Mod: 25,S$PBB,, | Performed by: PHYSICIAN ASSISTANT

## 2023-12-20 PROCEDURE — 20610 DRAIN/INJ JOINT/BURSA W/O US: CPT | Mod: PBBFAC,PN | Performed by: PHYSICIAN ASSISTANT

## 2023-12-20 PROCEDURE — 20610 LARGE JOINT ASPIRATION/INJECTION: L KNEE: ICD-10-PCS | Mod: S$PBB,LT,, | Performed by: PHYSICIAN ASSISTANT

## 2023-12-20 PROCEDURE — 99999PBSHW PR PBB SHADOW TECHNICAL ONLY FILED TO HB: ICD-10-PCS | Mod: PBBFAC,,,

## 2023-12-20 PROCEDURE — 99213 PR OFFICE/OUTPT VISIT, EST, LEVL III, 20-29 MIN: ICD-10-PCS | Mod: 25,S$PBB,, | Performed by: PHYSICIAN ASSISTANT

## 2023-12-20 RX ORDER — KETOROLAC TROMETHAMINE 30 MG/ML
30 INJECTION, SOLUTION INTRAMUSCULAR; INTRAVENOUS
Status: DISCONTINUED | OUTPATIENT
Start: 2023-12-20 | End: 2023-12-20 | Stop reason: HOSPADM

## 2023-12-20 RX ORDER — TIZANIDINE 2 MG/1
4 TABLET ORAL NIGHTLY PRN
Qty: 30 TABLET | Refills: 0 | Status: SHIPPED | OUTPATIENT
Start: 2023-12-20 | End: 2024-01-19

## 2023-12-20 RX ADMIN — KETOROLAC TROMETHAMINE 30 MG: 30 INJECTION, SOLUTION INTRAMUSCULAR at 01:12

## 2023-12-20 NOTE — PROCEDURES
Large Joint Aspiration/Injection: L knee    Date/Time: 12/20/2023 1:45 PM    Performed by: Nikki Dukes PA-C  Authorized by: Nikki Dukes PA-C    Consent Done?:  Yes (Verbal)  Indications:  Arthritis  Site marked: the procedure site was marked    Timeout: prior to procedure the correct patient, procedure, and site was verified    Prep: patient was prepped and draped in usual sterile fashion      Local anesthesia used?: Yes    Local anesthetic:  Topical anesthetic    Details:  Needle Size:  22 G  Ultrasonic Guidance for needle placement?: No    Approach:  Anterolateral  Location:  Knee  Site:  L knee  Medications:  30 mg ketorolac 30 mg/mL (1 mL)  Patient tolerance:  Patient tolerated the procedure well with no immediate complications

## 2023-12-21 ENCOUNTER — TELEPHONE (OUTPATIENT)
Dept: ORTHOPEDICS | Facility: CLINIC | Age: 63
End: 2023-12-21
Payer: MEDICAID

## 2023-12-21 NOTE — TELEPHONE ENCOUNTER
----- Message from May Fernández sent at 12/21/2023 12:56 PM CST -----  Type:  Needs Medical Advice    Who Called: pt     Would the patient rather a call back or a response via [facility]? call  Best Call Back Number: [phone]  Additional Information: pt is calling to get a status check on the medication tiZANidine (ZANAFLEX) 2 MG tablet pt stated that the pharmacy has not received it but according to her chart it was sent /received on 12/20/23

## 2024-02-14 ENCOUNTER — TELEPHONE (OUTPATIENT)
Dept: ORTHOPEDICS | Facility: CLINIC | Age: 64
End: 2024-02-14
Payer: MEDICARE

## 2024-02-14 NOTE — TELEPHONE ENCOUNTER
----- Message from May Fernández sent at 2/14/2024 12:16 PM CST -----  Type:  Needs Medical Advice    Who Called: pt     Would the patient rather a call back or a response via [facility]?  Call back   Best Call Back Number: [phone]  Additional Information: pt is requesting to get a return call to discuss having a surgery on her left knee

## 2024-02-15 ENCOUNTER — OFFICE VISIT (OUTPATIENT)
Dept: ORTHOPEDICS | Facility: CLINIC | Age: 64
End: 2024-02-15
Payer: MEDICARE

## 2024-02-15 VITALS
HEART RATE: 66 BPM | BODY MASS INDEX: 35.95 KG/M2 | HEIGHT: 67 IN | WEIGHT: 229.06 LBS | SYSTOLIC BLOOD PRESSURE: 141 MMHG | DIASTOLIC BLOOD PRESSURE: 88 MMHG

## 2024-02-15 DIAGNOSIS — M17.12 PRIMARY OSTEOARTHRITIS OF LEFT KNEE: Primary | ICD-10-CM

## 2024-02-15 DIAGNOSIS — S83.512S RUPTURE OF ANTERIOR CRUCIATE LIGAMENT OF LEFT KNEE, SEQUELA: ICD-10-CM

## 2024-02-15 PROCEDURE — 99213 OFFICE O/P EST LOW 20 MIN: CPT | Mod: PBBFAC,PN,25 | Performed by: PHYSICIAN ASSISTANT

## 2024-02-15 PROCEDURE — 99213 OFFICE O/P EST LOW 20 MIN: CPT | Mod: 25,S$PBB,, | Performed by: PHYSICIAN ASSISTANT

## 2024-02-15 PROCEDURE — 99999PBSHW PR PBB SHADOW TECHNICAL ONLY FILED TO HB: Mod: PBBFAC,,,

## 2024-02-15 PROCEDURE — 99999 PR PBB SHADOW E&M-EST. PATIENT-LVL III: CPT | Mod: PBBFAC,,, | Performed by: PHYSICIAN ASSISTANT

## 2024-02-15 PROCEDURE — 20610 DRAIN/INJ JOINT/BURSA W/O US: CPT | Mod: PBBFAC,PN,LT | Performed by: PHYSICIAN ASSISTANT

## 2024-02-15 PROCEDURE — 20610 DRAIN/INJ JOINT/BURSA W/O US: CPT | Mod: S$PBB,LT,, | Performed by: PHYSICIAN ASSISTANT

## 2024-02-15 RX ORDER — KETOROLAC TROMETHAMINE 30 MG/ML
30 INJECTION, SOLUTION INTRAMUSCULAR; INTRAVENOUS
Status: DISCONTINUED | OUTPATIENT
Start: 2024-02-15 | End: 2024-02-15 | Stop reason: HOSPADM

## 2024-02-15 RX ADMIN — KETOROLAC TROMETHAMINE 30 MG: 30 INJECTION, SOLUTION INTRAMUSCULAR at 02:02

## 2024-02-15 NOTE — PROGRESS NOTES
Subjective:      Patient ID: Taty Kenney is a 64 y.o. female.    Chief Complaint: Pain of the Left Knee      63yo F follow-up left knee osteoarthritis and chronic ACL tear.  Previous Gelsyn-3 injections provided minimal relief.  She was supposed to have Zilretta last year but I believe it was denied by insurance.  She is here today stating she has Medicare now.  Her left knee pain is laterally and notes instability and giving out sensation.  She is in a wheelchair.  She would like to discuss surgery.        Review of Systems   Constitutional: Negative for chills and fever.   Cardiovascular:  Negative for chest pain.   Respiratory:  Negative for cough.    Hematologic/Lymphatic: Does not bruise/bleed easily.   Skin:  Negative for poor wound healing and rash.   Musculoskeletal:  Positive for joint pain, myalgias and stiffness.   Gastrointestinal:  Negative for abdominal pain.   Genitourinary:  Negative for bladder incontinence.   Neurological:  Negative for dizziness, loss of balance and weakness.   Psychiatric/Behavioral:  Negative for altered mental status.        Review of patient's allergies indicates:   Allergen Reactions    Augmentin [amoxicillin-pot clavulanate] Other (See Comments)     Stomach cramps        Current Outpatient Medications   Medication Sig Dispense Refill    ALPRAZolam (XANAX) 1 MG tablet Take 1 mg by mouth daily as needed.      aspirin (ECOTRIN) 81 MG EC tablet Take 1 tablet (81 mg total) by mouth once daily. 30 tablet 0    FLUoxetine 40 MG capsule Take 40 mg by mouth.      hydroCHLOROthiazide (HYDRODIURIL) 25 MG tablet Take 25 mg by mouth once daily.      omeprazole (PRILOSEC) 40 MG capsule Take 40 mg by mouth.      ondansetron (ZOFRAN-ODT) 4 MG TbDL Take 2 tablets (8 mg total) by mouth every 8 (eight) hours as needed (nausea). 15 tablet 0    propranoloL (INDERAL) 40 MG tablet Take 20 mg by mouth 2 (two) times daily.       QUEtiapine (SEROQUEL) 400 MG tablet        No current  "facility-administered medications for this visit.     Facility-Administered Medications Ordered in Other Visits   Medication Dose Route Frequency Provider Last Rate Last Admin    0.9%  NaCl infusion   Intravenous Continuous Triston Naidu IV, MD        lidocaine HCL 10 mg/ml (1%) injection 1 mL  1 mL Intradermal Once PRN Triston Naidu IV, MD            The patient's relevant past medical, surgical, and social history was reviewed in Epic.       Objective:      VITAL SIGNS: BP (!) 141/88   Pulse 66   Ht 5' 7" (1.702 m)   Wt 103.9 kg (229 lb 0.9 oz)   BMI 35.88 kg/m²     General    Nursing note and vitals reviewed.  Constitutional: She is oriented to person, place, and time. She appears well-developed and well-nourished.   Neurological: She is alert and oriented to person, place, and time.     General Musculoskeletal Exam   Gait: antalgic         Left Knee Exam     Inspection   Swelling: present    Tenderness   The patient tender to palpation of the lateral joint line.    Range of Motion   Extension:  abnormal   Flexion:  abnormal     Tests   Meniscus   Dallas:  Medial - negative   Stability   Lachman: abnormal  - grade II    Other   Sensation: normal    Muscle Strength   Left Lower Extremity   Quadriceps:  3/5            Assessment:       1. Primary osteoarthritis of left knee    2. Rupture of anterior cruciate ligament of left knee, sequela          Plan:         Taty was seen today for pain.    Diagnoses and all orders for this visit:    Primary osteoarthritis of left knee  -     Prior authorization Order  -     Large Joint Aspiration/Injection: L knee    Rupture of anterior cruciate ligament of left knee, sequela    Left knee osteoarthritis.  Previous MRI revealed severe bone-on-bone lateral compartment arthritis.  I would like her to try Zilretta again to give her relief in the meanwhile. Toradol given today for acute pain. I will have her follow-up with Dr. Mccabe to discuss possible Tactoset or to see if " she has a candidate for a TKA. She will follow up for zilretta then.  Did discuss iovera but her pain is laterally so may not help with her symptoms.     Diagnoses and plan discussed with the patient, as well as the expected course and duration of his symptoms.  All questions and concerns were addressed prior to the end of the visit.   Instructed patient to call office if they have any future questions/concerns or to schedule apt. Patient will return to see me if symptoms worsen or fail to improve    Note dictated with voice recognition software, please excuse any grammatical errors.        Nikki Dukes PA-C   02/15/2024

## 2024-02-15 NOTE — PROCEDURES
Large Joint Aspiration/Injection: L knee    Date/Time: 2/15/2024 2:45 PM    Performed by: Nikki Dukes PA-C  Authorized by: Nikki Dukes PA-C    Consent Done?:  Yes (Verbal)  Indications:  Arthritis  Site marked: the procedure site was marked    Timeout: prior to procedure the correct patient, procedure, and site was verified    Prep: patient was prepped and draped in usual sterile fashion      Local anesthesia used?: Yes    Local anesthetic:  Topical anesthetic    Details:  Needle Size:  22 G  Ultrasonic Guidance for needle placement?: No    Approach:  Anterolateral  Location:  Knee  Site:  L knee  Medications:  30 mg ketorolac 30 mg/mL (1 mL)  Patient tolerance:  Patient tolerated the procedure well with no immediate complications

## 2024-02-19 DIAGNOSIS — M25.562 LEFT KNEE PAIN, UNSPECIFIED CHRONICITY: Primary | ICD-10-CM

## 2024-02-27 ENCOUNTER — OFFICE VISIT (OUTPATIENT)
Dept: ORTHOPEDICS | Facility: CLINIC | Age: 64
End: 2024-02-27
Payer: MEDICARE

## 2024-02-27 ENCOUNTER — HOSPITAL ENCOUNTER (OUTPATIENT)
Dept: RADIOLOGY | Facility: HOSPITAL | Age: 64
Discharge: HOME | End: 2024-02-27
Attending: ORTHOPAEDIC SURGERY
Payer: MEDICARE

## 2024-02-27 ENCOUNTER — CLINICAL SUPPORT (OUTPATIENT)
Dept: LAB | Facility: HOSPITAL | Age: 64
End: 2024-02-27
Attending: ORTHOPAEDIC SURGERY
Payer: MEDICARE

## 2024-02-27 VITALS — WEIGHT: 229.06 LBS | HEIGHT: 67 IN | BODY MASS INDEX: 35.95 KG/M2

## 2024-02-27 DIAGNOSIS — M62.81 QUADRICEPS WEAKNESS: ICD-10-CM

## 2024-02-27 DIAGNOSIS — M25.562 LEFT KNEE PAIN, UNSPECIFIED CHRONICITY: ICD-10-CM

## 2024-02-27 DIAGNOSIS — M25.562 CHRONIC PAIN OF LEFT KNEE: Primary | ICD-10-CM

## 2024-02-27 DIAGNOSIS — R93.7 BONE MARROW EDEMA: ICD-10-CM

## 2024-02-27 DIAGNOSIS — G89.29 CHRONIC PAIN OF LEFT KNEE: Primary | ICD-10-CM

## 2024-02-27 DIAGNOSIS — M25.562 CHRONIC PAIN OF LEFT KNEE: ICD-10-CM

## 2024-02-27 DIAGNOSIS — G89.29 CHRONIC PAIN OF LEFT KNEE: ICD-10-CM

## 2024-02-27 PROCEDURE — 93005 ELECTROCARDIOGRAM TRACING: CPT

## 2024-02-27 PROCEDURE — 99215 OFFICE O/P EST HI 40 MIN: CPT | Mod: S$PBB,,, | Performed by: ORTHOPAEDIC SURGERY

## 2024-02-27 PROCEDURE — G2211 COMPLEX E/M VISIT ADD ON: HCPCS | Mod: S$PBB,,, | Performed by: ORTHOPAEDIC SURGERY

## 2024-02-27 PROCEDURE — 99214 OFFICE O/P EST MOD 30 MIN: CPT | Mod: PBBFAC,25,PN | Performed by: ORTHOPAEDIC SURGERY

## 2024-02-27 PROCEDURE — 93010 ELECTROCARDIOGRAM REPORT: CPT | Mod: ,,, | Performed by: INTERNAL MEDICINE

## 2024-02-27 PROCEDURE — 99999 PR PBB SHADOW E&M-EST. PATIENT-LVL IV: CPT | Mod: PBBFAC,,, | Performed by: ORTHOPAEDIC SURGERY

## 2024-02-27 PROCEDURE — 73562 X-RAY EXAM OF KNEE 3: CPT | Mod: 26,LT,, | Performed by: RADIOLOGY

## 2024-02-27 PROCEDURE — 73562 X-RAY EXAM OF KNEE 3: CPT | Mod: TC,PN,LT

## 2024-02-27 NOTE — PROGRESS NOTES
[facility] [address]         Patient ID: Taty Kenney is a 64 y.o. female.    Chief Complaint: Pain of the Left Knee      KNEE PAIN: Complains of pain to the leftknee.   PAIN LOCATED: diffuse, anterior, lateral, and medial  ONSET: 1 year   QUALITY:  Patient states the pain is worsening  HISTORY: Previous knee injury/surgery: No  Hx: none significant, previous lisfranc on this side   ASSOCIATED SYMPTOM AND TRIGGERS:Standing/Weightbearing, walking, trouble w stairs, stiffness, swelling, limping, stiffness w sitting   Has tried and failed cardiovascular activities such as walking, biking and resistance exercises  USES ASSISTIVE DEVICE: wheelchair  RELIEVED BY:HA injection  PATIENT DENIES: bruising, redness, deformity     Patient with now about 1 year history of left knee pain she does have an MRI demonstrating degenerative meniscal tears as well as some bone marrow edema in the lateral compartment.     She is got multiple rounds of injections including HA and Toradol, recently submitted for [facility].         Past Medical History:   Diagnosis Date    Anxiety     Arthritis     Depression     Fractures     Headache     Hypertension     Movement disorder     Multiple sclerosis     Neuropathy         Past Surgical History:   Procedure Laterality Date    ABDOMINAL SURGERY      ANKLE FRACTURE SURGERY      ANKLE HARDWARE REMOVAL Left 10/6/2020    Procedure: REMOVAL, HARDWARE, ANKLE, medial and lateral ankle plates;  Surgeon: Triston Naidu IV, MD;  Location: [facility];  Service: Orthopedics;  Laterality: Left;  shannon screw removal tray (Vijay notified per Carly 9/29, EF)confirmed 10/5 CW  hardware removal tray    FOOT SURGERY      HYSTERECTOMY      IRRIGATION AND DEBRIDEMENT OF LOWER EXTREMITY Left 7/24/2020    Procedure: LEFT ANKLE INFECTION IRRIGATION AND DEBRIDEMENT, possible External fixation;  Surgeon: Triston Naidu IV, MD;  Location: [facility] OR;  Service: Orthopedics;  Laterality: Left;  correct card and give to  Shari  Pulse Lavage and large bone or ortho minor tray- to start case  Zac- Styker to bring ankle set and ex fix - needed in case of removal of harware-Confrimed zac- AM 07/23    OPEN REDUCTION AND INTERNAL FIXATION (ORIF) OF INJURY OF ANKLE Left 7/7/2020    Procedure: ORIF, ANKLE;  Surgeon: Triston Naidu IV, MD;  Location: [facility];  Service: Orthopedics;  Laterality: Left;  GENERAL + REGIONAL  ORTHO MAJOR TRAY  BONE FOAM RAMP  FLUOROSCOPY  Hair Lopez confirmed CW 7/6        Current Outpatient Medications   Medication Instructions    ALPRAZolam (XANAX) 1 mg, Oral, Daily PRN    aspirin (ECOTRIN) 81 mg, Oral, Daily    FLUoxetine 40 mg, Oral    hydroCHLOROthiazide (HYDRODIURIL) 25 mg, Oral, Daily    omeprazole (PRILOSEC) 40 mg, Oral    ondansetron (ZOFRAN-ODT) 8 mg, Oral, Every 8 hours PRN    propranoloL (INDERAL) 20 mg, Oral, 2 times daily    QUEtiapine (SEROQUEL) 400 MG tablet No dose, route, or frequency recorded.        Review of patient's allergies indicates:   Allergen Reactions    Augmentin [amoxicillin-pot clavulanate] Other (See Comments)     Stomach cramps       Social History     Socioeconomic History    Marital status:    Tobacco Use    Smoking status: Former    Smokeless tobacco: Never   Substance and Sexual Activity    Alcohol use: No    Drug use: No       Family History   Problem Relation Age of Onset    Hypertension Mother     Hyperlipidemia Mother     Alcohol abuse Mother     Osteoporosis Mother     Cancer Father     Osteoporosis Sister     Broken bones Neg Hx     Dislocations Neg Hx     Scoliosis Neg Hx     Severe sprains Neg Hx     Anesthesia problems Neg Hx     Clotting disorder Neg Hx          Review of systems positive for joint pain/swelling.     Objective:   Physical Exam:     Skin atraumatic   In clinic in a wheelchair  nonTTP medial joint line, TTP lateral joint line  ROM 5-80  Stable anterior/posterior   Stable varus/valgus  Motor intact TA/GS/EHL/FHL  SILT  SP/DP/Sa/Mclaughlin/T  Braden MASSEY      Imaging: X-Ray knee reviewed, KL 3 changes with joint space narrowing, sclerosis, and osteophytosis. She has an MRI from 08/24/2023 with a complex posterior horn lateral meniscus tear with associated extrusion.  There is chondral thinning in the lateral compartment.  She also has significant marrow edema on the distal femur and proximal tibia in the lateral compartment.      Assessment:        Taty Kenney is a 64 y.o. female with left knee pain that has failed injections.  MRI significant for meniscal pathology as well as marrow edema.    Encounter Diagnoses   Name Primary?    Chronic pain of left knee Yes    Bone marrow edema     Quadriceps weakness        Plan :          The patient presents today for followup. MRI revealed a tear of the meniscus as well as some degenerative arthritis of the Left knee. We discussed these findings with the patient. We did discuss arthroscopy and the fact that its role would hopefully help the symptoms related to meniscal tear; however, would not necessarily address any symptoms related to degenerative arthritis. The patient was also offered a course of PT first but would rather move forward with arthroscopy. The patient understands this and would like to move forward. I think that is reasonable. We reviewed risks and benefits of surgery. We also reviewed the role of physical therapy vs arthroscopy. Research indicates that approx 9 mos of PT will achieve similar results to the near immediate improvement with arthroscopy. The patient would like the more quick improvement compared to the delayed improvement with PT. We will go ahead and schedule this at a time that is convenient for the patient.  MRI shows bone marrow edema. we discussed a relatively new procedure where we inject calcium and place a bioabsorbable nail called ossio into the area of bony edema to provide more structural support in the hope of alleviating the edema mediated bone  pain. this procedure can be painful because of drilling the bone. the dilemma is understanding how much pain is coming from the intraarticular pathology vs the bony pathology. The patient has elected to move forward with the injection of calcium into the bone. Major risks given the limited clinical science include fracture, infection, continued pain, and extravasation of the calcium into the joint. we discussed the benefits vs risks and patient feels the benefits outweigh the risks.       Orders Placed This Encounter   Procedures    X-Ray Chest 1 View Pre-OP    CBC Auto Differential    Basic Metabolic Panel    Ambulatory referral/consult to Physical/Occupational Therapy    EKG 12-lead        Margie Llamas MD   02/27/2024

## 2024-02-28 LAB
OHS QRS DURATION: 76 MS
OHS QTC CALCULATION: 460 MS

## 2024-02-28 NOTE — DISCHARGE INSTRUCTIONS
Activity as tolerated   Ok to change dressing in 3-4 days   Ok to shower after 5 days   Follow up with Dr. Mccabe in 2 weeks      ANESTHESIA  -For the first 24 hours after surgery:  Do not drive, use heavy equipment, make important decisions, or drink alcohol  -It is normal to feel sleepy for several hours.  Rest until you are more awake.  -Have someone stay with you, if needed.  They can watch for problems and help keep you safe.  -Some possible post anesthesia side effects include: nausea and vomiting, sore throat and hoarseness, sleepiness, and dizziness.    PAIN  -If you have pain after surgery, pain medicine will help you feel better.  Take it as directed, before pain becomes severe.  Most pain relievers taken by mouth need at least 20-30 minutes to start working.  -Do not drive or drink alcohol while taking pain medicine.  -Pain medication can upset your stomach.  Taking them with a little food may help.  -Other ways to help control pain: elevation, ice, and relaxation  -Call your surgeon if still having unmanageable pain an hour after taking pain medicine.  -Pain medicine can cause constipation.  Taking an over-the counter stool softener while on prescription pain medicine and drinking plenty of fluids can prevent this side effect.  -Call your surgeon if you have severe side effects like: breathing problems, trouble waking up, dizziness, confusion, or severe constipation.    NAUSEA  -Some people have nausea after surgery.  This is often because of anesthesia, pain, pain medicine, or the stress of surgery.  -Do not push yourself to eat.  Start off with clear liquids and soup.  Slowly move to solid foods.  Don't eat fatty, rich, spicy foods at first.  Eat smaller amounts.  -If you develop persistent nausea and vomiting please notify your surgeon immediately.    BLEEDING  -Different types of surgery require different types of care and dressing changes.  It is important to follow all instructions and advice from  your surgeon.  Change dressing as directed.  Call your surgeon for any concerns regarding postop bleeding.    SIGNS OF INFECTION  -Signs of infection include: fever, swelling, drainage, and redness  -Notify your surgeon if you have a fever of 100.4 F (38.0 C) or higher.  -Notify your surgeon if you notice redness, swelling, increased pain, pus, or a foul smell at the incision site.

## 2024-03-03 ENCOUNTER — ANESTHESIA EVENT (OUTPATIENT)
Dept: SURGERY | Facility: HOSPITAL | Age: 64
End: 2024-03-03
Payer: MEDICARE

## 2024-03-03 NOTE — ANESTHESIA PREPROCEDURE EVALUATION
03/03/2024  [facility]  Anesthesia Pre-Operative Evaluation         Patient Name: Taty Kenney  YOB: 1960  MRN: [id number]    SUBJECTIVE:     Pre-operative evaluation for Procedure(s) (LRB):  ARTHROSCOPY, KNEE (Left)  INSERTION, ORTHOPEDIC PIN OR SCREW, PERCUTANEOUS (Left)     03/03/2024    Taty Kenney is a 64 y.o. female w/ a significant PMHx of falls and dizziness, obesity, HTN.  Patient now presents for the above procedure(s).    TTE:   none documented.     Patient Active Problem List   Diagnosis    Deep postoperative wound infection    Closed trimalleolar fracture of left ankle    MRSA (methicillin resistant Staphylococcus aureus) infection    Infection due to Enterobacter cloacae    S/P hardware removal    Painful orthopaedic hardware    Retained orthopedic hardware    Ankle pain    Ankle weakness    Decreased ROM of ankle    Ataxia    Bone marrow edema    Chronic pain of left knee    Quadriceps weakness       Review of patient's allergies indicates:   Allergen Reactions    Augmentin [amoxicillin-pot clavulanate] Other (See Comments)     Stomach cramps       Current Inpatient Medications:      Current Facility-Administered Medications on File Prior to Encounter   Medication Dose Route Frequency Provider Last Rate Last Admin    0.9%  NaCl infusion   Intravenous Continuous Triston Naidu IV, MD        lidocaine HCL 10 mg/ml (1%) injection 1 mL  1 mL Intradermal Once PRN Triston Naidu IV, MD         Current Outpatient Medications on File Prior to Encounter   Medication Sig Dispense Refill    ALPRAZolam (XANAX) 1 MG tablet Take 1 mg by mouth daily as needed.      aspirin (ECOTRIN) 81 MG EC tablet Take 1 tablet (81 mg total) by mouth once daily. 30 tablet 0    FLUoxetine 40 MG capsule Take 40 mg by mouth.      hydroCHLOROthiazide (HYDRODIURIL) 25 MG tablet Take  25 mg by mouth once daily.      omeprazole (PRILOSEC) 40 MG capsule Take 40 mg by mouth.      ondansetron (ZOFRAN-ODT) 4 MG TbDL Take 2 tablets (8 mg total) by mouth every 8 (eight) hours as needed (nausea). 15 tablet 0    propranoloL (INDERAL) 40 MG tablet Take 20 mg by mouth 2 (two) times daily.       QUEtiapine (SEROQUEL) 400 MG tablet          Past Surgical History:   Procedure Laterality Date    ABDOMINAL SURGERY      ANKLE FRACTURE SURGERY      ANKLE HARDWARE REMOVAL Left 10/6/2020    Procedure: REMOVAL, HARDWARE, ANKLE, medial and lateral ankle plates;  Surgeon: Triston Naidu IV, MD;  Location: [facility] OR;  Service: Orthopedics;  Laterality: Left;  hair screw removal tray (Zac notified per Carly 9/29, )confirmed 10/5   hardware removal tray    FOOT SURGERY      HYSTERECTOMY      IRRIGATION AND DEBRIDEMENT OF LOWER EXTREMITY Left 7/24/2020    Procedure: LEFT ANKLE INFECTION IRRIGATION AND DEBRIDEMENT, possible External fixation;  Surgeon: Triston Naidu IV, MD;  Location: [facility] OR;  Service: Orthopedics;  Laterality: Left;  correct card and give to Shari  Pulse Lavage and large bone or ortho minor tray- to start case  Zac- Styker to bring ankle set and ex fix - needed in case of removal of harware-Confrimed zac- AM 07/23    OPEN REDUCTION AND INTERNAL FIXATION (ORIF) OF INJURY OF ANKLE Left 7/7/2020    Procedure: ORIF, ANKLE;  Surgeon: Triston Naidu IV, MD;  Location: [facility] OR;  Service: Orthopedics;  Laterality: Left;  GENERAL + REGIONAL  ORTHO MAJOR TRAY  BONE FOAM RAMP  FLUOROSCOPY  Hair Lopez confirmed  7/6       OBJECTIVE:     Vital Signs Range (Last 24H):         Significant Labs:  Lab Results   Component Value Date    WBC 5.68 02/27/2024    HGB 12.9 02/27/2024    HCT 38.6 02/27/2024     02/27/2024    ALT 20 11/02/2020    AST 29 11/02/2020     02/27/2024    K 4.2 02/27/2024     02/27/2024    CREATININE 1.0 02/27/2024    BUN 18 02/27/2024    CO2 24 02/27/2024    TSH  1.68 01/25/2024    INR 1.0 07/06/2020       Diagnostic Studies: No relevant studies.    EKG:   Results for orders placed or performed in visit on 02/27/24   EKG 12-lead    Collection Time: 02/27/24  4:20 PM   Result Value Ref Range    QRS Duration 76 ms    [facility] QTC Calculation 460 ms    Narrative    Test Reason : M25.562,G89.29,R93.7,M62.81,    Vent. Rate : 084 BPM     Atrial Rate : 084 BPM     P-R Int : 162 ms          QRS Dur : 076 ms      QT Int : 390 ms       P-R-T Axes : 044 026 066 degrees     QTc Int : 460 ms    Sinus rhythm with Premature atrial complexes with Aberrant conduction  Possible Anterior infarct ,age undetermined  Abnormal ECG  When compared with ECG of 06-JUL-2020 15:59,  Aberrant conduction is now Present  Confirmed by Ford Doyle MD ([id number]) on 2/28/2024 1:50:37 PM    Referred By: DANIA GALVEZ           Confirmed By:Ford Doyle MD       ASSESSMENT/PLAN:           Pre-op Assessment    I have reviewed the Patient Summary Reports.     I have reviewed the Nursing Notes. I have reviewed the NPO Status.   I have reviewed the Medications.     Review of Systems  Anesthesia Hx:    PONV - reports nothing recently  History of prior surgery of interest to airway management or planning:          Denies Family Hx of Anesthesia complications.    Denies Personal Hx of Anesthesia complications.                    Social:  Former Smoker, No Alcohol Use       Hematology/Oncology:  Hematology Normal                                     Cardiovascular:  Exercise tolerance: good   Hypertension       Denies Angina.       Denies MATA.                            Pulmonary:  Pulmonary Normal      Denies Shortness of breath.                  Renal/:  Renal/ Normal                 Hepatic/GI:  Hepatic/GI Normal     Denies GERD.             Neurological:    Neuromuscular Disease, (MS)  Headaches                                 Endocrine:  Endocrine Normal    DM - past discussion, but not following and no meds        Obesity / BMI > 30  Psych:    depression                Physical Exam  General: Cooperative, Alert, Oriented and Well nourished    Airway:  Mallampati: II   Mouth Opening: Normal  TM Distance: Normal  Tongue: Normal  Neck ROM: Normal ROM    Dental:  Intact    Chest/Lungs:  Normal Respiratory Rate        Anesthesia Plan  Type of Anesthesia, risks & benefits discussed:    Anesthesia Type: Gen Supraglottic Airway  Intra-op Monitoring Plan: Standard ASA Monitors  Post Op Pain Control Plan: multimodal analgesia and IV/PO Opioids PRN  Induction:  IV  Informed Consent: Informed consent signed with the Patient and all parties understand the risks and agree with anesthesia plan.  All questions answered.   ASA Score: 3  Day of Surgery Review of History & Physical: H&P Update referred to the surgeon/provider.    Ready For Surgery From Anesthesia Perspective.     .

## 2024-03-04 ENCOUNTER — HOSPITAL ENCOUNTER (OUTPATIENT)
Facility: HOSPITAL | Age: 64
Discharge: HOME | End: 2024-03-04
Attending: ORTHOPAEDIC SURGERY | Admitting: ORTHOPAEDIC SURGERY
Payer: MEDICARE

## 2024-03-04 ENCOUNTER — ANESTHESIA (OUTPATIENT)
Dept: SURGERY | Facility: HOSPITAL | Age: 64
End: 2024-03-04
Payer: MEDICARE

## 2024-03-04 VITALS
TEMPERATURE: 97 F | OXYGEN SATURATION: 96 % | BODY MASS INDEX: 36.8 KG/M2 | DIASTOLIC BLOOD PRESSURE: 72 MMHG | SYSTOLIC BLOOD PRESSURE: 128 MMHG | HEIGHT: 66 IN | RESPIRATION RATE: 16 BRPM | HEART RATE: 85 BPM | WEIGHT: 229 LBS

## 2024-03-04 DIAGNOSIS — M25.562 CHRONIC PAIN OF LEFT KNEE: Primary | ICD-10-CM

## 2024-03-04 DIAGNOSIS — G89.29 CHRONIC PAIN OF LEFT KNEE: Primary | ICD-10-CM

## 2024-03-04 DIAGNOSIS — M25.569 KNEE PAIN: ICD-10-CM

## 2024-03-04 LAB
APPEARANCE FLD: NORMAL
BODY FLD TYPE: NORMAL
COLOR FLD: NORMAL
LYMPHOCYTES NFR FLD MANUAL: 80 %
MESOTHL CELL NFR FLD MANUAL: 4 %
MONOS+MACROS NFR FLD MANUAL: 8 %
NEUTROPHILS NFR FLD MANUAL: 8 %
WBC # FLD: 785 /CU MM

## 2024-03-04 PROCEDURE — 37000008 HC ANESTHESIA 1ST 15 MINUTES: Performed by: ORTHOPAEDIC SURGERY

## 2024-03-04 PROCEDURE — 29881 ARTHRS KNE SRG MNISECTMY M/L: CPT | Mod: LT,,, | Performed by: ORTHOPAEDIC SURGERY

## 2024-03-04 PROCEDURE — 36000711: Performed by: ORTHOPAEDIC SURGERY

## 2024-03-04 PROCEDURE — 63600175 PHARM REV CODE 636 W HCPCS: Performed by: STUDENT IN AN ORGANIZED HEALTH CARE EDUCATION/TRAINING PROGRAM

## 2024-03-04 PROCEDURE — 36000710: Performed by: ORTHOPAEDIC SURGERY

## 2024-03-04 PROCEDURE — 25000003 PHARM REV CODE 250: Performed by: STUDENT IN AN ORGANIZED HEALTH CARE EDUCATION/TRAINING PROGRAM

## 2024-03-04 PROCEDURE — 27201423 OPTIME MED/SURG SUP & DEVICES STERILE SUPPLY: Performed by: ORTHOPAEDIC SURGERY

## 2024-03-04 PROCEDURE — 37000009 HC ANESTHESIA EA ADD 15 MINS: Performed by: ORTHOPAEDIC SURGERY

## 2024-03-04 PROCEDURE — 25000003 PHARM REV CODE 250: Performed by: NURSE ANESTHETIST, CERTIFIED REGISTERED

## 2024-03-04 PROCEDURE — 89051 BODY FLUID CELL COUNT: CPT | Performed by: ORTHOPAEDIC SURGERY

## 2024-03-04 PROCEDURE — C1713 ANCHOR/SCREW BN/BN,TIS/BN: HCPCS | Performed by: ORTHOPAEDIC SURGERY

## 2024-03-04 PROCEDURE — 25000003 PHARM REV CODE 250: Performed by: ORTHOPAEDIC SURGERY

## 2024-03-04 PROCEDURE — 71000033 HC RECOVERY, INTIAL HOUR: Performed by: ORTHOPAEDIC SURGERY

## 2024-03-04 PROCEDURE — 63600175 PHARM REV CODE 636 W HCPCS: Mod: JG | Performed by: ORTHOPAEDIC SURGERY

## 2024-03-04 PROCEDURE — 63600175 PHARM REV CODE 636 W HCPCS: Performed by: NURSE ANESTHETIST, CERTIFIED REGISTERED

## 2024-03-04 PROCEDURE — D9220A PRA ANESTHESIA: Mod: ANES,,, | Performed by: STUDENT IN AN ORGANIZED HEALTH CARE EDUCATION/TRAINING PROGRAM

## 2024-03-04 PROCEDURE — 71000015 HC POSTOP RECOV 1ST HR: Performed by: ORTHOPAEDIC SURGERY

## 2024-03-04 PROCEDURE — D9220A PRA ANESTHESIA: Mod: CRNA,,, | Performed by: NURSE ANESTHETIST, CERTIFIED REGISTERED

## 2024-03-04 RX ORDER — MIDAZOLAM HYDROCHLORIDE 1 MG/ML
INJECTION, SOLUTION INTRAMUSCULAR; INTRAVENOUS
Status: DISCONTINUED | OUTPATIENT
Start: 2024-03-04 | End: 2024-03-04

## 2024-03-04 RX ORDER — PROCHLORPERAZINE EDISYLATE 5 MG/ML
5 INJECTION INTRAMUSCULAR; INTRAVENOUS EVERY 30 MIN PRN
Status: ACTIVE | OUTPATIENT
Start: 2024-03-04

## 2024-03-04 RX ORDER — LIDOCAINE HYDROCHLORIDE 20 MG/ML
INJECTION INTRAVENOUS
Status: DISCONTINUED | OUTPATIENT
Start: 2024-03-04 | End: 2024-03-04

## 2024-03-04 RX ORDER — HYDROMORPHONE HYDROCHLORIDE 2 MG/ML
0.2 INJECTION, SOLUTION INTRAMUSCULAR; INTRAVENOUS; SUBCUTANEOUS EVERY 5 MIN PRN
Status: DISCONTINUED | OUTPATIENT
Start: 2024-03-04 | End: 2024-03-04 | Stop reason: HOSPADM

## 2024-03-04 RX ORDER — DEXAMETHASONE SODIUM PHOSPHATE 4 MG/ML
INJECTION, SOLUTION INTRA-ARTICULAR; INTRALESIONAL; INTRAMUSCULAR; INTRAVENOUS; SOFT TISSUE
Status: DISCONTINUED | OUTPATIENT
Start: 2024-03-04 | End: 2024-03-04

## 2024-03-04 RX ORDER — METOPROLOL TARTRATE 1 MG/ML
INJECTION, SOLUTION INTRAVENOUS
Status: DISCONTINUED | OUTPATIENT
Start: 2024-03-04 | End: 2024-03-04

## 2024-03-04 RX ORDER — EPINEPHRINE 1 MG/ML
INJECTION, SOLUTION, CONCENTRATE INTRAVENOUS
Status: DISCONTINUED | OUTPATIENT
Start: 2024-03-04 | End: 2024-03-04 | Stop reason: HOSPADM

## 2024-03-04 RX ORDER — CEFAZOLIN SODIUM 2 G/50ML
2 SOLUTION INTRAVENOUS ONCE
Status: COMPLETED | OUTPATIENT
Start: 2024-03-04 | End: 2024-03-04

## 2024-03-04 RX ORDER — PROPOFOL 10 MG/ML
VIAL (ML) INTRAVENOUS
Status: DISCONTINUED | OUTPATIENT
Start: 2024-03-04 | End: 2024-03-04

## 2024-03-04 RX ORDER — DICLOFENAC SODIUM 75 MG/1
75 TABLET, DELAYED RELEASE ORAL 2 TIMES DAILY
Qty: 84 TABLET | Refills: 0 | Status: SHIPPED | OUTPATIENT
Start: 2024-03-04 | End: 2024-03-13

## 2024-03-04 RX ORDER — PHENYLEPHRINE HYDROCHLORIDE 10 MG/ML
INJECTION INTRAVENOUS
Status: DISCONTINUED | OUTPATIENT
Start: 2024-03-04 | End: 2024-03-04

## 2024-03-04 RX ORDER — ACETAMINOPHEN 325 MG/1
325 TABLET ORAL EVERY 4 HOURS
Qty: 84 TABLET | Refills: 0 | Status: SHIPPED | OUTPATIENT
Start: 2024-03-04 | End: 2024-03-18

## 2024-03-04 RX ORDER — SODIUM CHLORIDE 0.9 % (FLUSH) 0.9 %
10 SYRINGE (ML) INJECTION DAILY PRN
Status: ACTIVE | OUTPATIENT
Start: 2024-03-04

## 2024-03-04 RX ORDER — BUPIVACAINE HYDROCHLORIDE 2.5 MG/ML
INJECTION, SOLUTION INFILTRATION; PERINEURAL
Status: DISCONTINUED | OUTPATIENT
Start: 2024-03-04 | End: 2024-03-04 | Stop reason: HOSPADM

## 2024-03-04 RX ORDER — LORAZEPAM 0.5 MG/1
0.5 TABLET ORAL EVERY 6 HOURS PRN
COMMUNITY

## 2024-03-04 RX ORDER — ONDANSETRON HYDROCHLORIDE 2 MG/ML
INJECTION, SOLUTION INTRAVENOUS
Status: DISCONTINUED | OUTPATIENT
Start: 2024-03-04 | End: 2024-03-04

## 2024-03-04 RX ORDER — ACETAMINOPHEN 10 MG/ML
INJECTION, SOLUTION INTRAVENOUS
Status: DISCONTINUED | OUTPATIENT
Start: 2024-03-04 | End: 2024-03-04

## 2024-03-04 RX ORDER — AMLODIPINE BESYLATE 5 MG/1
5 TABLET ORAL DAILY
COMMUNITY

## 2024-03-04 RX ORDER — OXYCODONE HYDROCHLORIDE 5 MG/1
5 TABLET ORAL
Status: DISPENSED | OUTPATIENT
Start: 2024-03-04

## 2024-03-04 RX ORDER — FENTANYL CITRATE 50 UG/ML
INJECTION, SOLUTION INTRAMUSCULAR; INTRAVENOUS
Status: DISCONTINUED | OUTPATIENT
Start: 2024-03-04 | End: 2024-03-04

## 2024-03-04 RX ADMIN — PHENYLEPHRINE HYDROCHLORIDE 50 MCG: 10 INJECTION INTRAVENOUS at 05:03

## 2024-03-04 RX ADMIN — ACETAMINOPHEN 1000 MG: 10 INJECTION, SOLUTION INTRAVENOUS at 04:03

## 2024-03-04 RX ADMIN — HYPROMELLOSE 2910 2 DROP: 5 SOLUTION OPHTHALMIC at 04:03

## 2024-03-04 RX ADMIN — PROPOFOL 150 MG: 10 INJECTION, EMULSION INTRAVENOUS at 04:03

## 2024-03-04 RX ADMIN — PROPOFOL 50 MG: 10 INJECTION, EMULSION INTRAVENOUS at 04:03

## 2024-03-04 RX ADMIN — OXYCODONE HYDROCHLORIDE 5 MG: 5 TABLET ORAL at 05:03

## 2024-03-04 RX ADMIN — FENTANYL CITRATE 50 MCG: 50 INJECTION INTRAMUSCULAR; INTRAVENOUS at 04:03

## 2024-03-04 RX ADMIN — CEFAZOLIN SODIUM 2 G: 2 SOLUTION INTRAVENOUS at 04:03

## 2024-03-04 RX ADMIN — LIDOCAINE HYDROCHLORIDE 60 MG: 20 INJECTION, SOLUTION INTRAVENOUS at 04:03

## 2024-03-04 RX ADMIN — SODIUM CHLORIDE: 0.9 INJECTION, SOLUTION INTRAVENOUS at 04:03

## 2024-03-04 RX ADMIN — ONDANSETRON 8 MG: 2 INJECTION, SOLUTION INTRAMUSCULAR; INTRAVENOUS at 04:03

## 2024-03-04 RX ADMIN — METOPROLOL TARTRATE 1 MG: 1 INJECTION, SOLUTION INTRAVENOUS at 04:03

## 2024-03-04 RX ADMIN — MIDAZOLAM 2 MG: 1 INJECTION INTRAMUSCULAR; INTRAVENOUS at 04:03

## 2024-03-04 RX ADMIN — DEXAMETHASONE SODIUM PHOSPHATE 8 MG: 4 INJECTION, SOLUTION INTRA-ARTICULAR; INTRALESIONAL; INTRAMUSCULAR; INTRAVENOUS; SOFT TISSUE at 04:03

## 2024-03-04 NOTE — H&P
U Ortho Interval H&P  The patient has been personally evaluated by me. They verbally confirmed they will undergo L knee arthroscopy, L knee subchondroplasty with Dr. Mccabe. There have been no changes in patient's health since the last time they were seen.    Margie Llamas MD  [facility] Orthopedic Surgery

## 2024-03-04 NOTE — TRANSFER OF CARE
"Anesthesia Transfer of Care Note    Patient: Taty Kenney    Procedure(s) Performed: Procedure(s) (LRB):  ARTHROSCOPY, KNEE (Left)  INSERTION, ORTHOPEDIC PIN OR SCREW, PERCUTANEOUS (Left)    Patient location: PACU    Anesthesia Type: general    Transport from OR: Transported from OR on 2-3 L/min O2 by NC with adequate spontaneous ventilation    Post pain: adequate analgesia    Post assessment: no apparent anesthetic complications    Post vital signs: stable    Level of consciousness: awake    Nausea/Vomiting: no nausea/vomiting    Complications: none    Transfer of care protocol was followed      Last vitals: Visit Vitals  BP (!) 141/85 (BP Location: Right arm, Patient Position: Lying)   Pulse 88   Temp 36.3 °C (97.3 °F) (Skin)   Resp 18   Ht 5' 6" (1.676 m)   Wt 103.9 kg (229 lb)   SpO2 96%   Breastfeeding No   BMI 36.96 kg/m²     "

## 2024-03-04 NOTE — OP NOTE
3/4/2024    Pre op dx:  Left knee meniscus tear, bone marrow edema/insufficiency fracture lateral femoral condyle and proximal lateral tibia    Post op dx:  Same    Procedure:  Arthroscopic partial lateral meniscectomy, calcium phosphate injection and ossio resorbable nail placement proximal lateral tibia and lateral femoral condyle    Attending Surgeon: Alex Mccabe MD    Assistants: dr sandhu    Complications: none    Estimated bood loss: < 20cc    Implants:  Tactoset calcium phosphate injection, ossio nail placeemnt    Indication:  This is a 64-year-old female with mechanical left knee pain.  MRI revealed bone marrow edema/insufficiency fracture of the proximal lateral tibia and lateral femoral condyle.  She would failed nonsurgical management including physical therapy and injections.  We discussed the fact arthroscopy would hopefully help her symptoms related to meniscus tear however may not address any symptoms related to degenerative arthritis.  She understood this and agreed to move forward with surgery.    Findings:  Patient had significant degenerative changes of the lateral compartment.  There was a degenerative posterior horn lateral meniscus tear    Procedure:  Patient is brought to operating room placed supine operative table.  General anesthesia was induced without any difficulties.  Left knee was prepped and draped in sterile fashion.  Prior to incision proper sent procedures well as antibiotic administration was verified.  Left knee was placed over the side of the bed and anteromedial and anterolateral portal sites injected Marcaine.  Eleven blade was used to establish the anterolateral portal was then dilated using a trocar and cannula and camera placed in suprapatellar pouch.  Undersurface of patella was visualized which showed grade 3 changes as the trochlea.  Patella tracking centrally within trochlear groove.  Camera was then placed in the medial compartment and spinal needle was used to establish  the anteromedial portal.  This was then created using 11 blade and dilated using a trocar.  Knee was then placed in valgus position medial compartment probed.  Medial meniscus had some minor free edge fraying.  Articular surface tibial plateau had grade 3 changes and grade 2 fissuring on the femur.  Knee was then flexed ACL visualized.  This had some partial tearing in it.  Knee was then placed in figure 4 position lateral compartment visualized.  Lateral meniscus posterior horn had degenerative tear which was debrided to a stable rim.  Articular surface tibial plateau grade 4 changes, and femur had grade 3 changes.  Cannula was then placed in suprapatellar pouch and all excess fluid evacuated the cannula.  Next we turned our attention to the calcium phosphate injections.  We identified area of the proximal lateral tibia that matched up with the MRI and under fluoroscopy drilled a guidewire to the area of edema.  This wound was then measured to be 26 mm so the nail could be cut to that length.  Small stab incision was made over the proximal lateral tibia and drilled using the ossio drill.  With the K-wire in place the calcium phosphate cannula was placed over the K-wire and then the K-wire removed.  The inner sleeve was then inserted with the fenestrations pointing sideways, and calcium phosphate injected in 360 degree fashion at the maximum area of bone marrow edema in the proximal lateral tibia until slight resistance was felt.  Calcium phosphate cannula was then removed and K-wire was reinserted and the ossio nail countersunk using the  and mallet.  Once this was verified under fluoroscopy we turned our attention to the lateral femoral condyle.    From a lateral to medial direction under fluoroscopy we isolated the area of bone marrow edema along the posterior aspect lateral femoral condyle coordinating this with the MRI.  We then drilled into the posterior aspect lateral femoral condyle and removed the  inner cannula.  We then placed the fenestrated cannula into the calcium phosphate cannula and screwed the calcium phosphate syringe into place ensuring that the last fenestration was past the lateral cortex.  Calcium phosphate was injected until slight resistance was felt.  We then placed the stylus into the cannula and paused to allow for the calcium phosphate to harden.  Once it was hardened we then removed all the cannulas and closed all our stab incisions with Dermabond and Steri-Strips.  Incisions were dressed with sterile ABD and Ace wrap.  Patient was then extubated by anesthesia without any difficulties and transferred to recovery room in stable condition

## 2024-03-05 ENCOUNTER — TELEPHONE (OUTPATIENT)
Dept: ORTHOPEDICS | Facility: CLINIC | Age: 64
End: 2024-03-05
Payer: MEDICARE

## 2024-03-05 RX ORDER — TRAMADOL HYDROCHLORIDE 50 MG/1
50 TABLET ORAL EVERY 12 HOURS PRN
Qty: 6 TABLET | Refills: 0 | Status: SHIPPED | OUTPATIENT
Start: 2024-03-05 | End: 2024-03-08

## 2024-03-05 NOTE — ANESTHESIA POSTPROCEDURE EVALUATION
Anesthesia Post Evaluation    Patient: Taty Kenney    Procedure(s) Performed: Procedure(s) (LRB):  ARTHROSCOPY, KNEE (Left)  INSERTION, ORTHOPEDIC PIN OR SCREW, PERCUTANEOUS (Left)    Final Anesthesia Type: general      Patient location during evaluation: PACU  Patient participation: Yes- Able to Participate  Level of consciousness: awake and alert, oriented and awake  Post-procedure vital signs: reviewed and stable  Pain management: adequate  Airway patency: patent  CHAYO mitigation strategies: Multimodal analgesia  PONV status at discharge: No PONV  Anesthetic complications: no      Cardiovascular status: blood pressure returned to baseline and hemodynamically stable  Respiratory status: unassisted and spontaneous ventilation  Hydration status: euvolemic  Follow-up not needed.              Vitals Value Taken Time   /72 03/04/24 1850   Temp 36.1 °C (97 °F) 03/04/24 1850   Pulse 85 03/04/24 1850   Resp 16 03/04/24 1850   SpO2 96 % 03/04/24 1850         Event Time   Out of Recovery 18:00:00         Pain/Pranav Score: Pain Rating Prior to Med Admin: 5 (3/4/2024  5:52 PM)  Pranav Score: 10 (3/4/2024  6:52 PM)

## 2024-03-05 NOTE — TELEPHONE ENCOUNTER
----- Message from Erika Acosta sent at 3/5/2024  1:48 PM CST -----  Type:  Needs Medical Advice    Who Called: pt   Symptoms (please be specific): Pain  from surgery   How long has patient had these symptoms:  Yesterday   [id number] [address] [facility] [address]   Phone: [phone]  Fax: [phone]        Would the patient rather a call back or a response via [facility]? Call   Best Call Back Number:  [phone]  Additional Information:

## 2024-03-05 NOTE — BRIEF OP NOTE
[place] - Surgery ([facility])  Brief Operative Note    Surgery Date: 3/4/2024     Surgeon(s) and Role:  Panel 1:     * Alex Mccabe MD - Primary  Panel 2:     * Alex Mccabe MD - Primary    Assisting Surgeon: None    Pre-op Diagnosis:  Chronic pain of left knee [M25.562, G89.29]  Bone marrow edema [R93.7]  Quadriceps weakness [M62.81]    Post-op Diagnosis:  Post-Op Diagnosis Codes:     * Chronic pain of left knee [M25.562, G89.29]     * Bone marrow edema [R93.7]     * Quadriceps weakness [M62.81]    Procedure(s) (LRB):  ARTHROSCOPY, KNEE (Left)  INSERTION, ORTHOPEDIC PIN OR SCREW, PERCUTANEOUS (Left)    Anesthesia: Choice    Operative Findings: see op note    Estimated Blood Loss: * No values recorded between 3/4/2024  4:50 PM and 3/4/2024  5:34 PM *         Specimens:   Specimen (24h ago, onward)      None              Discharge Note    OUTCOME: Patient tolerated treatment/procedure well without complication and is now ready for discharge.    DISPOSITION: Home or Self Care    FINAL DIAGNOSIS:  <principal problem not specified>    FOLLOWUP: In clinic    DISCHARGE INSTRUCTIONS:    Discharge Procedure Orders   Diet Adult Regular     Leave dressing on - Keep it clean, dry, and intact until clinic visit   Order Comments: See patient instructions     Activity as tolerated        Clinical Reference Documents Added to Patient Instructions         Document    ARTHROSCOPY DISCHARGE INSTRUCTIONS (ENGLISH)

## 2024-03-06 ENCOUNTER — DOCUMENTATION ONLY (OUTPATIENT)
Dept: REHABILITATION | Facility: HOSPITAL | Age: 64
End: 2024-03-06

## 2024-03-06 NOTE — PROGRESS NOTES
Physical Therapy: No show/Cancellation of Visit  Date: 03/06/2024    Patient cancelled today's PT appointment. Reason for cancellation: none given. Patient's next scheduled appointment is 3/25/2024. PT called and left voicemail regarding need to see patient sooner given post op status. Email sent to Dr. Mccabe regarding cancellations.     Cancel: 2  No show: 0    Therapist: Jenn Feliciano PT

## 2024-03-07 NOTE — H&P
[facility] H&P         [facility] [address]            Patient ID: Taty Kenney is a 64 y.o. female.     Chief Complaint: Pain of the Left Knee        KNEE PAIN: Complains of pain to the leftknee.   PAIN LOCATED: diffuse, anterior, lateral, and medial  ONSET: 1 year   QUALITY:  Patient states the pain is worsening  HISTORY: Previous knee injury/surgery: No  Hx: none significant, previous lisfranc on this side   ASSOCIATED SYMPTOM AND TRIGGERS:Standing/Weightbearing, walking, trouble w stairs, stiffness, swelling, limping, stiffness w sitting   Has tried and failed cardiovascular activities such as walking, biking and resistance exercises  USES ASSISTIVE DEVICE: wheelchair  RELIEVED BY:HA injection  PATIENT DENIES: bruising, redness, deformity      Patient with now about 1 year history of left knee pain she does have an MRI demonstrating degenerative meniscal tears as well as some bone marrow edema in the lateral compartment.      She is got multiple rounds of injections including HA and Toradol, recently submitted for [facility].                Past Medical History:   Diagnosis Date    Anxiety      Arthritis      Depression      Fractures      Headache      Hypertension      Movement disorder      Multiple sclerosis      Neuropathy                 Past Surgical History:   Procedure Laterality Date    ABDOMINAL SURGERY        ANKLE FRACTURE SURGERY        ANKLE HARDWARE REMOVAL Left 10/6/2020     Procedure: REMOVAL, HARDWARE, ANKLE, medial and lateral ankle plates;  Surgeon: Triston Naidu IV, MD;  Location: [facility] OR;  Service: Orthopedics;  Laterality: Left;  shannon screw removal tray (Vijay notified per Carly 9/29, EF)confirmed 10/5 CW  hardware removal tray    FOOT SURGERY        HYSTERECTOMY        IRRIGATION AND DEBRIDEMENT OF LOWER EXTREMITY Left 7/24/2020     Procedure: LEFT ANKLE INFECTION IRRIGATION AND DEBRIDEMENT, possible External fixation;  Surgeon: Triston Naidu IV, MD;  Location: [facility] OR;   Service: Orthopedics;  Laterality: Left;  correct card and give to Shari  Pulse Lavage and large bone or ortho minor tray- to start case  Zac- Styker to bring ankle set and ex fix - needed in case of removal of harware-Confrimed zac- AM 07/23    OPEN REDUCTION AND INTERNAL FIXATION (ORIF) OF INJURY OF ANKLE Left 7/7/2020     Procedure: ORIF, ANKLE;  Surgeon: Triston Naidu IV, MD;  Location: [facility];  Service: Orthopedics;  Laterality: Left;  GENERAL + REGIONAL  ORTHO MAJOR TRAY  BONE FOAM RAMP  FLUOROSCOPY  Hair Lopez confirmed CW 7/6              Current Outpatient Medications   Medication Instructions    ALPRAZolam (XANAX) 1 mg, Oral, Daily PRN    aspirin (ECOTRIN) 81 mg, Oral, Daily    FLUoxetine 40 mg, Oral    hydroCHLOROthiazide (HYDRODIURIL) 25 mg, Oral, Daily    omeprazole (PRILOSEC) 40 mg, Oral    ondansetron (ZOFRAN-ODT) 8 mg, Oral, Every 8 hours PRN    propranoloL (INDERAL) 20 mg, Oral, 2 times daily    QUEtiapine (SEROQUEL) 400 MG tablet No dose, route, or frequency recorded.               Review of patient's allergies indicates:   Allergen Reactions    Augmentin [amoxicillin-pot clavulanate] Other (See Comments)       Stomach cramps         Social History              Socioeconomic History    Marital status:    Tobacco Use    Smoking status: Former    Smokeless tobacco: Never   Substance and Sexual Activity    Alcohol use: No    Drug use: No                  Family History   Problem Relation Age of Onset    Hypertension Mother      Hyperlipidemia Mother      Alcohol abuse Mother      Osteoporosis Mother      Cancer Father      Osteoporosis Sister      Broken bones Neg Hx      Dislocations Neg Hx      Scoliosis Neg Hx      Severe sprains Neg Hx      Anesthesia problems Neg Hx      Clotting disorder Neg Hx            Review of systems positive for joint pain/swelling.      Objective:   Physical Exam:      Skin atraumatic   In clinic in a wheelchair  nonTTP medial joint line, TTP  lateral joint line  ROM 5-80  Stable anterior/posterior   Stable varus/valgus  Motor intact TA/GS/EHL/FHL  SILT SP/DP/Sa/Mclaughlin/T  Toes WWP        Imaging: X-Ray knee reviewed, KL 3 changes with joint space narrowing, sclerosis, and osteophytosis. She has an MRI from 08/24/2023 with a complex posterior horn lateral meniscus tear with associated extrusion.  There is chondral thinning in the lateral compartment.  She also has significant marrow edema on the distal femur and proximal tibia in the lateral compartment.        Assessment:        Assessment   Taty Kenney is a 64 y.o. female with left knee pain that has failed injections.  MRI significant for meniscal pathology as well as marrow edema.          Encounter Diagnoses   Name Primary?    Chronic pain of left knee Yes    Bone marrow edema      Quadriceps weakness           Plan :              The patient presents today for followup. MRI revealed a tear of the meniscus as well as some degenerative arthritis of the Left knee. We discussed these findings with the patient. We did discuss arthroscopy and the fact that its role would hopefully help the symptoms related to meniscal tear; however, would not necessarily address any symptoms related to degenerative arthritis. The patient was also offered a course of PT first but would rather move forward with arthroscopy. The patient understands this and would like to move forward. I think that is reasonable. We reviewed risks and benefits of surgery. We also reviewed the role of physical therapy vs arthroscopy. Research indicates that approx 9 mos of PT will achieve similar results to the near immediate improvement with arthroscopy. The patient would like the more quick improvement compared to the delayed improvement with PT. We will go ahead and schedule this at a time that is convenient for the patient.  MRI shows bone marrow edema. we discussed a relatively new procedure where we inject calcium and place a  bioabsorbable nail called ossio into the area of bony edema to provide more structural support in the hope of alleviating the edema mediated bone pain. this procedure can be painful because of drilling the bone. the dilemma is understanding how much pain is coming from the intraarticular pathology vs the bony pathology. The patient has elected to move forward with the injection of calcium into the bone. Major risks given the limited clinical science include fracture, infection, continued pain, and extravasation of the calcium into the joint. we discussed the benefits vs risks and patient feels the benefits outweigh the risks.             Orders Placed This Encounter   Procedures    X-Ray Chest 1 View Pre-OP    CBC Auto Differential    Basic Metabolic Panel    Ambulatory referral/consult to Physical/Occupational Therapy    EKG 12-lead         Margie Llamas MD   02/27/2024

## 2024-03-11 ENCOUNTER — TELEPHONE (OUTPATIENT)
Dept: ORTHOPEDICS | Facility: CLINIC | Age: 64
End: 2024-03-11
Payer: MEDICARE

## 2024-03-11 NOTE — TELEPHONE ENCOUNTER
----- Message from Rehana An sent at 3/9/2024  1:57 PM CST -----  Regarding: Post-Op Patient  Good morning,     I have reached out to this patient on separate occasions to get Ms. Kenney schedule but we are unable to reach her. I have left numerous of messages but still the patient has not returned our phone calls. We will be removing her order, but the patient is more than welcome to give us a call at [phone] to schedule.     Thanks  Rehana

## 2024-03-12 ENCOUNTER — TELEPHONE (OUTPATIENT)
Dept: ORTHOPEDICS | Facility: CLINIC | Age: 64
End: 2024-03-12
Payer: MEDICARE

## 2024-03-12 NOTE — TELEPHONE ENCOUNTER
----- Message from Ashlyn Morataya sent at 3/12/2024  2:59 PM CDT -----  Regarding: Call back  Contact: [phone]  Type:  Needs Medical Advice    Who Called: PT   Symptoms (please be specific): Left leg swelling   How long has patient had these symptoms:  today   Pharmacy name and phone #: [id number] [address] [facility] [address] Phone: [phone]  Fax: [phone]  Would the patient rather a call back or a response via [facility]? Call back   Best Call Back Number: [phone]  Additional Information:

## 2024-03-13 ENCOUNTER — TELEPHONE (OUTPATIENT)
Dept: ORTHOPEDICS | Facility: CLINIC | Age: 64
End: 2024-03-13
Payer: MEDICARE

## 2024-03-13 ENCOUNTER — OFFICE VISIT (OUTPATIENT)
Dept: ORTHOPEDICS | Facility: CLINIC | Age: 64
End: 2024-03-13
Payer: MEDICARE

## 2024-03-13 VITALS
WEIGHT: 229.06 LBS | HEIGHT: 66 IN | HEART RATE: 85 BPM | SYSTOLIC BLOOD PRESSURE: 128 MMHG | DIASTOLIC BLOOD PRESSURE: 72 MMHG | BODY MASS INDEX: 36.81 KG/M2

## 2024-03-13 DIAGNOSIS — G89.29 CHRONIC PAIN OF LEFT KNEE: ICD-10-CM

## 2024-03-13 DIAGNOSIS — Z98.890 S/P ARTHROSCOPIC SURGERY OF LEFT KNEE: Primary | ICD-10-CM

## 2024-03-13 DIAGNOSIS — M25.562 CHRONIC PAIN OF LEFT KNEE: ICD-10-CM

## 2024-03-13 DIAGNOSIS — R93.7 BONE MARROW EDEMA: ICD-10-CM

## 2024-03-13 DIAGNOSIS — M79.662 PAIN OF LEFT CALF: ICD-10-CM

## 2024-03-13 PROCEDURE — 99999 PR PBB SHADOW E&M-EST. PATIENT-LVL III: CPT | Mod: PBBFAC,,, | Performed by: PHYSICIAN ASSISTANT

## 2024-03-13 PROCEDURE — 99213 OFFICE O/P EST LOW 20 MIN: CPT | Mod: PBBFAC,PN | Performed by: PHYSICIAN ASSISTANT

## 2024-03-13 PROCEDURE — 99024 POSTOP FOLLOW-UP VISIT: CPT | Mod: POP,,, | Performed by: PHYSICIAN ASSISTANT

## 2024-03-13 RX ORDER — CELECOXIB 200 MG/1
200 CAPSULE ORAL 2 TIMES DAILY
Qty: 60 CAPSULE | Refills: 0 | Status: SHIPPED | OUTPATIENT
Start: 2024-03-13

## 2024-03-13 NOTE — PROGRESS NOTES
Subjective:      Patient ID: Taty Kenney is a 64 y.o. female.    Chief Complaint: Pain and Swelling of the Left Knee      65yo F s/p  Left ATS partial lateral meniscectomy and Tactoset injection on 3/4/24 with Dr. Mccabe. She is here today about 9 days PO c/o worsening pain and swelling in her knee and calf.  She states she has been taking diclofenac, tylenol and took 3 tramadol for pain without any relief. Has also been icing and elevating her knee. She has pain in the calf also she is concerned about. When asked about PT, she states she had to cancel because she had vertigo. She states she has another visit scheduled but there is no visits in the system. She denies fevers, chilld, SOB. In wheelchair today.         Review of Systems   Constitutional: Negative for chills and fever.   Cardiovascular:  Negative for chest pain.   Respiratory:  Negative for cough.    Hematologic/Lymphatic: Does not bruise/bleed easily.   Skin:  Negative for poor wound healing and rash.   Musculoskeletal:  Positive for joint pain, myalgias and stiffness.   Gastrointestinal:  Negative for abdominal pain.   Genitourinary:  Negative for bladder incontinence.   Neurological:  Negative for dizziness, loss of balance and weakness.   Psychiatric/Behavioral:  Negative for altered mental status.        Review of patient's allergies indicates:   Allergen Reactions    Augmentin [amoxicillin-pot clavulanate] Other (See Comments)     Stomach cramps    Shellfish containing products Hives        Current Outpatient Medications   Medication Sig Dispense Refill    acetaminophen (TYLENOL) 325 MG tablet Take 1 tablet (325 mg total) by mouth every 4 (four) hours. for 14 days 84 tablet 0    amLODIPine (NORVASC) 5 MG tablet Take 5 mg by mouth once daily.      FLUoxetine 40 MG capsule Take 40 mg by mouth.      LORazepam (ATIVAN) 0.5 MG tablet Take 0.5 mg by mouth every 6 (six) hours as needed for Anxiety.      omeprazole (PRILOSEC) 40 MG capsule Take  "40 mg by mouth.      ondansetron (ZOFRAN-ODT) 4 MG TbDL Take 2 tablets (8 mg total) by mouth every 8 (eight) hours as needed (nausea). 15 tablet 0    QUEtiapine (SEROQUEL) 400 MG tablet       celecoxib (CELEBREX) 200 MG capsule Take 1 capsule (200 mg total) by mouth 2 (two) times daily. 60 capsule 0     No current facility-administered medications for this visit.     Facility-Administered Medications Ordered in Other Visits   Medication Dose Route Frequency Provider Last Rate Last Admin    0.9%  NaCl infusion   Intravenous Continuous Triston Naidu IV, MD   New Bag at 03/04/24 1619    lidocaine HCL 10 mg/ml (1%) injection 1 mL  1 mL Intradermal Once PRN Triston Naidu IV, MD        oxyCODONE immediate release tablet 5 mg  5 mg Oral Q3H PRN Raheel Moran MD   5 mg at 03/04/24 1752    prochlorperazine injection Soln 5 mg  5 mg Intravenous Q30 Min PRN Raheel Moran MD        sodium chloride 0.9% flush 10 mL  10 mL Intravenous Daily PRN Raheel Moran MD            The patient's relevant past medical, surgical, and social history was reviewed in Epic.       Objective:      VITAL SIGNS: /72   Pulse 85   Ht 5' 6" (1.676 m)   Wt 103.9 kg (229 lb 0.9 oz)   BMI 36.97 kg/m²     General    Nursing note and vitals reviewed.  Constitutional: She is oriented to person, place, and time. She appears well-developed and well-nourished.   Neurological: She is alert and oriented to person, place, and time.     General Musculoskeletal Exam   Gait: antalgic       Right Knee Exam     Inspection   Swelling: present  Bruising: present    Tenderness   The patient is tender to palpation of the lateral joint line.    Range of Motion   Extension:  5   Flexion:  90     Tests   Ligament Examination   Lachman: normal (-1 to 2mm)     Other   Sensation: normal    Comments:  Swelling and bruising noted in the left lower leg and ankle.   +calf tenderness  Negative homans     Muscle Strength   Right Lower Extremity   Quadriceps:  4/5    "         Assessment:       1. S/P arthroscopic surgery of left knee    2. Chronic pain of left knee    3. Pain of left calf    4. Bone marrow edema          Plan:         Taty was seen today for pain and swelling.    Diagnoses and all orders for this visit:    S/P arthroscopic surgery of left knee    Chronic pain of left knee  -     celecoxib (CELEBREX) 200 MG capsule; Take 1 capsule (200 mg total) by mouth 2 (two) times daily.    Pain of left calf  -     US Lower Extremity Veins Left; Future  -     celecoxib (CELEBREX) 200 MG capsule; Take 1 capsule (200 mg total) by mouth 2 (two) times daily.    Bone marrow edema    Left knee pain and swelling s/p 9 days out ATS/tactoset. Will order left calf ultrasound to rule out possible DVT but unlikely she has this. I believe most her pain and swelling is from lack of elevation and not wearing compression stockings which she has. She also has significant arthritis noted intraoperatively. Will prescribe celebrex for pain. Patients states she wants something stronger because  it did not work in the past from previous ankle surgery. Requesting narcotic medication but declined. She will see Dr. Mccabe in a week for PO visit. She can discuss that at her first PO visit.      Diagnoses and plan discussed with the patient, as well as the expected course and duration of his symptoms.  All questions and concerns were addressed prior to the end of the visit.   Instructed patient to call office if they have any future questions/concerns or to schedule apt. Patient will return to see me if symptoms worsen or fail to improve    Note dictated with voice recognition software, please excuse any grammatical errors.        Nikki Dukes PA-C   03/13/2024

## 2024-03-13 NOTE — TELEPHONE ENCOUNTER
----- Message from Anna Banerjee sent at 3/13/2024 11:08 AM CDT -----  Type:  Patient Returning Call    Who Called:pt  Who Left Message for Patient:office  Does the patient know what this is regarding?:coming in earlier  Would the patient rather a call back or a response via [facility]? call  Best Call Back Number: [phone]  Additional Information: states she can come now would like to know if that's okay

## 2024-04-04 ENCOUNTER — RESEARCH ENCOUNTER (OUTPATIENT)
Dept: RESEARCH | Facility: HOSPITAL | Age: 64
End: 2024-04-04
Payer: MEDICARE

## 2024-04-04 ENCOUNTER — OFFICE VISIT (OUTPATIENT)
Dept: ORTHOPEDICS | Facility: CLINIC | Age: 64
End: 2024-04-04
Payer: MEDICARE

## 2024-04-04 VITALS — WEIGHT: 229.06 LBS | HEIGHT: 66 IN | BODY MASS INDEX: 36.81 KG/M2

## 2024-04-04 DIAGNOSIS — Z98.890 STATUS POST ARTHROSCOPY OF KNEE: ICD-10-CM

## 2024-04-04 DIAGNOSIS — M25.562 CHRONIC PAIN OF LEFT KNEE: ICD-10-CM

## 2024-04-04 DIAGNOSIS — M17.12 PRIMARY OSTEOARTHRITIS OF LEFT KNEE: Primary | ICD-10-CM

## 2024-04-04 DIAGNOSIS — G89.29 CHRONIC PAIN OF LEFT KNEE: ICD-10-CM

## 2024-04-04 PROCEDURE — 20610 DRAIN/INJ JOINT/BURSA W/O US: CPT | Mod: PBBFAC,PN | Performed by: ORTHOPAEDIC SURGERY

## 2024-04-04 PROCEDURE — 99213 OFFICE O/P EST LOW 20 MIN: CPT | Mod: PBBFAC,PN,25 | Performed by: ORTHOPAEDIC SURGERY

## 2024-04-04 PROCEDURE — 99024 POSTOP FOLLOW-UP VISIT: CPT | Mod: POP,,, | Performed by: ORTHOPAEDIC SURGERY

## 2024-04-04 PROCEDURE — 99999PBSHW PR PBB SHADOW TECHNICAL ONLY FILED TO HB: Mod: PBBFAC,,,

## 2024-04-04 PROCEDURE — 99999 PR PBB SHADOW E&M-EST. PATIENT-LVL III: CPT | Mod: PBBFAC,,, | Performed by: ORTHOPAEDIC SURGERY

## 2024-04-04 RX ORDER — LIDOCAINE HYDROCHLORIDE 10 MG/ML
4 INJECTION INFILTRATION; PERINEURAL
Status: DISCONTINUED | OUTPATIENT
Start: 2024-04-04 | End: 2024-04-04 | Stop reason: HOSPADM

## 2024-04-04 RX ORDER — KETOROLAC TROMETHAMINE 30 MG/ML
30 INJECTION, SOLUTION INTRAMUSCULAR; INTRAVENOUS
Status: DISCONTINUED | OUTPATIENT
Start: 2024-04-04 | End: 2024-04-04 | Stop reason: HOSPADM

## 2024-04-04 RX ORDER — BUPIVACAINE HYDROCHLORIDE 5 MG/ML
5 INJECTION, SOLUTION PERINEURAL
Status: DISCONTINUED | OUTPATIENT
Start: 2024-04-04 | End: 2024-04-04 | Stop reason: HOSPADM

## 2024-04-04 RX ADMIN — BUPIVACAINE HYDROCHLORIDE 5 ML: 5 INJECTION, SOLUTION PERINEURAL at 11:04

## 2024-04-04 RX ADMIN — KETOROLAC TROMETHAMINE 30 MG: 30 INJECTION, SOLUTION INTRAMUSCULAR; INTRAVENOUS at 11:04

## 2024-04-04 RX ADMIN — LIDOCAINE HYDROCHLORIDE 4 ML: 10 INJECTION, SOLUTION INFILTRATION; PERINEURAL at 11:04

## 2024-04-04 NOTE — PROGRESS NOTES
Protocol: innovations in Genicular Outcomes Registry (Marley)  Protocol#: Marley  IRB#: 2021.156  Version Date: 10-Timmy-2023  PI: Alex Mccabe MD  Patient Initials: LADARIUS.     Study Recruitment Note:     Research coordinator met with patient, in private clinic room, to discuss above mentioned protocol. Patient is alert and oriented x 3. Mood and affect appropriate to the situation. Patient states that she is not participating in any other research studies. Patient states understanding about the diagnosis of osteoarthritis of the knee and of the procedure to being done on that knee.  Purpose of study reviewed with the patient.  Patient states understanding of this information.   Length of study and number of participants reviewed with patient; patient states understanding of the length of the study.   Study procedure discussed in detail with patient. Patient states understanding of this information.  Potential benefits of study along with costs and/or payment reimbursement per insurance discussed with patient; Patient states understanding that insurance will cover cost of all standard of care medications and procedures. Patient aware of $20 payment for qualifying visits with completed questionnaires.  Alternative treatment methods discussed with patient; Patient states understanding of this information.   Study related questions/compensation for injury, and whom to contact regarding rights as a research subject all reviewed with patient; Patient verbalizes understanding of this information.   Voluntary participation and withdrawal from research stressed to patient; patient states understanding that she may withdraw consent at any time without compromise to care.   Confidentiality and HIPAA discussed with patient. Patient verbalizes understanding of this information.          Patient states her interest in study and will consider participation at next OA appointment. Study brochure and paper copy of consent form was given to  patient for review. She was instructed to call if she has any questions or concerns.

## 2024-04-04 NOTE — PROCEDURES
Large Joint Aspiration/Injection: L knee    Date/Time: 4/4/2024 11:00 AM    Performed by: Alex Mccabe MD  Authorized by: Alex Mccabe MD    Consent Done?:  Yes (Verbal)  Indications:  Arthritis  Site marked: the procedure site was marked    Timeout: prior to procedure the correct patient, procedure, and site was verified    Prep: patient was prepped and draped in usual sterile fashion      Local anesthesia used?: Yes    Local anesthetic:  Topical anesthetic    Details:  Needle Size:  22 G  Ultrasonic Guidance for needle placement?: No    Approach:  Anterolateral  Location:  Knee  Site:  L knee  Medications:  30 mg ketorolac 30 mg/mL (1 mL); 5 mL BUPivacaine 0.5 % (5 mg/mL); 4 mL LIDOcaine HCL 10 mg/ml (1%) 10 mg/mL (1 %)  Patient tolerance:  Patient tolerated the procedure well with no immediate complications

## 2024-04-04 NOTE — PROGRESS NOTES
Subjective:      Patient ID: Taty Kenney is a 64 y.o. female.    Chief Complaint: Pain and Post-op Evaluation of the Left Knee    Some improvement in pain s/p knee scope but not much        Social History     Occupational History    Not on file   Tobacco Use    Smoking status: Former    Smokeless tobacco: Never   Substance and Sexual Activity    Alcohol use: No    Drug use: No    Sexual activity: Not on file      ROS      Objective:    Ortho/SPM Exam   Portals healed  Pain free rom      Assessment:       1. Primary osteoarthritis of left knee    2. Chronic pain of left knee    3. Status post arthroscopy of knee          Plan:       Patient had areas of grade 4 changes during arthroscopy.  She is got fairly significant arthritic disease in the knee.  We will go ahead and start Toradol injection today.  And move forward with hyaluronic acid injections as well.  She is kl 3, and is not necessarily a candidate for total knee replacement at this time.

## 2024-04-26 ENCOUNTER — RESEARCH ENCOUNTER (OUTPATIENT)
Dept: RESEARCH | Facility: HOSPITAL | Age: 64
End: 2024-04-26
Payer: MEDICARE

## 2024-04-26 ENCOUNTER — OFFICE VISIT (OUTPATIENT)
Dept: ORTHOPEDICS | Facility: CLINIC | Age: 64
End: 2024-04-26
Payer: MEDICARE

## 2024-04-26 VITALS — HEIGHT: 66 IN | WEIGHT: 229.06 LBS | BODY MASS INDEX: 36.81 KG/M2

## 2024-04-26 DIAGNOSIS — M25.562 CHRONIC PAIN OF LEFT KNEE: ICD-10-CM

## 2024-04-26 DIAGNOSIS — M17.12 PRIMARY OSTEOARTHRITIS OF LEFT KNEE: Primary | ICD-10-CM

## 2024-04-26 DIAGNOSIS — G89.29 CHRONIC PAIN OF LEFT KNEE: ICD-10-CM

## 2024-04-26 PROCEDURE — 20610 DRAIN/INJ JOINT/BURSA W/O US: CPT | Mod: S$PBB,79,LT, | Performed by: PHYSICIAN ASSISTANT

## 2024-04-26 PROCEDURE — 99499 UNLISTED E&M SERVICE: CPT | Mod: S$PBB,,, | Performed by: PHYSICIAN ASSISTANT

## 2024-04-26 PROCEDURE — 99999PBSHW PR PBB SHADOW TECHNICAL ONLY FILED TO HB: Mod: JZ,PBBFAC,,

## 2024-04-26 PROCEDURE — 20610 DRAIN/INJ JOINT/BURSA W/O US: CPT | Mod: PBBFAC,PN,LT | Performed by: PHYSICIAN ASSISTANT

## 2024-04-26 RX ADMIN — Medication 16.8 MG: at 11:04

## 2024-04-26 NOTE — PROGRESS NOTES
Protocol: innovations in Genicular Outcomes Registry (Marley)  Protocol#: Marley  IRB#: 2021.156  Version Date: 10-Timmy-2023  PI: Alex Mccabe MD  Patient Initials: K.N.     Declined Study Note    Patient is in today to be treated for OA knee pain. Patient was called by Scotty about participation in Marley Study as well as to confirm that she was still coming for her Gelysn injection. Patient declined study but confirmed treatment.     Will re-approach patient at a later date about the participation in study after the Gelsyn injection series is complete.

## 2024-04-26 NOTE — PROCEDURES
Large Joint Aspiration/Injection: L knee    Date/Time: 4/26/2024 11:00 AM    Performed by: Nikki Dukes PA-C  Authorized by: Nikki Dukes PA-C    Consent Done?:  Yes (Verbal)  Indications:  Pain  Site marked: the procedure site was marked    Timeout: prior to procedure the correct patient, procedure, and site was verified    Prep: patient was prepped and draped in usual sterile fashion    Local anesthesia used?: No    Local anesthetic:  Topical anesthetic    Details:  Needle Size:  22 G  Ultrasonic Guidance for needle placement?: No    Approach:  Anterolateral  Location:  Knee  Site:  L knee  Medications:  16.8 mg sodium hyaluronate 16.8 mg/2 mL  Patient tolerance:  Patient tolerated the procedure well with no immediate complications

## 2024-04-26 NOTE — PROGRESS NOTES
Subjective:      Patient ID: Taty Kenney is a 64 y.o. female.    Chief Complaint: No chief complaint on file.      Patient is here for First Gelsyn3 injection(s) for left knee osteoarthritis. Patient tolerated last injection well. No new complaints today.          Review of Systems   Constitutional: Negative for chills and fever.   Cardiovascular:  Negative for chest pain.   Respiratory:  Negative for cough.    Hematologic/Lymphatic: Does not bruise/bleed easily.   Skin:  Negative for poor wound healing and rash.   Musculoskeletal:  Positive for joint pain, myalgias and stiffness.   Gastrointestinal:  Negative for abdominal pain.   Genitourinary:  Negative for bladder incontinence.   Neurological:  Negative for dizziness, loss of balance and weakness.   Psychiatric/Behavioral:  Negative for altered mental status.        Review of patient's allergies indicates:   Allergen Reactions    Augmentin [amoxicillin-pot clavulanate] Other (See Comments)     Stomach cramps    Shellfish containing products Hives        Current Outpatient Medications   Medication Sig Dispense Refill    amLODIPine (NORVASC) 5 MG tablet Take 5 mg by mouth once daily.      celecoxib (CELEBREX) 200 MG capsule Take 1 capsule (200 mg total) by mouth 2 (two) times daily. 60 capsule 0    FLUoxetine 40 MG capsule Take 40 mg by mouth.      LORazepam (ATIVAN) 0.5 MG tablet Take 0.5 mg by mouth every 6 (six) hours as needed for Anxiety.      omeprazole (PRILOSEC) 40 MG capsule Take 40 mg by mouth.      ondansetron (ZOFRAN-ODT) 4 MG TbDL Take 2 tablets (8 mg total) by mouth every 8 (eight) hours as needed (nausea). 15 tablet 0    QUEtiapine (SEROQUEL) 400 MG tablet        No current facility-administered medications for this visit.     Facility-Administered Medications Ordered in Other Visits   Medication Dose Route Frequency Provider Last Rate Last Admin    0.9%  NaCl infusion   Intravenous Continuous Triston Naidu IV, MD   New Bag at 03/04/24  1619    lidocaine HCL 10 mg/ml (1%) injection 1 mL  1 mL Intradermal Once PRN Triston Naidu IV, MD        oxyCODONE immediate release tablet 5 mg  5 mg Oral Q3H PRN Raheel Moran MD   5 mg at 03/04/24 1752    prochlorperazine injection Soln 5 mg  5 mg Intravenous Q30 Min PRN Raheel Moran MD        sodium chloride 0.9% flush 10 mL  10 mL Intravenous Daily PRN Raheel Moran MD            The patient's relevant past medical, surgical, and social history was reviewed in Epic.       Objective:      VITAL SIGNS: There were no vitals taken for this visit.    Ortho/SPM Exam         Assessment:       1. Primary osteoarthritis of left knee    2. Chronic pain of left knee          Plan:         Diagnoses and all orders for this visit:    Primary osteoarthritis of left knee  -     Large Joint Aspiration/Injection: L knee    Chronic pain of left knee  -     Large Joint Aspiration/Injection: L knee    I injected the left knee with one dose of Gelsyn3 today.  We will see the patient back next week or as needed.     Diagnoses and plan discussed with the patient, as well as the expected course and duration of his symptoms.  All questions and concerns were addressed prior to the end of the visit.   Instructed patient to call office if they have any future questions/concerns or to schedule apt. Patient will return to see me if symptoms worsen or fail to improve    Note dictated with voice recognition software, please excuse any grammatical errors.        Nikki Dukes PA-C   04/26/2024

## 2024-05-07 ENCOUNTER — OFFICE VISIT (OUTPATIENT)
Dept: ORTHOPEDICS | Facility: CLINIC | Age: 64
End: 2024-05-07
Payer: MEDICARE

## 2024-05-07 VITALS — HEIGHT: 66 IN | WEIGHT: 229.06 LBS | BODY MASS INDEX: 36.81 KG/M2

## 2024-05-07 DIAGNOSIS — M17.12 PRIMARY OSTEOARTHRITIS OF LEFT KNEE: Primary | ICD-10-CM

## 2024-05-07 PROCEDURE — 99999 PR PBB SHADOW E&M-EST. PATIENT-LVL III: CPT | Mod: PBBFAC,,, | Performed by: PHYSICIAN ASSISTANT

## 2024-05-07 PROCEDURE — 99213 OFFICE O/P EST LOW 20 MIN: CPT | Mod: PBBFAC,PN,25 | Performed by: PHYSICIAN ASSISTANT

## 2024-05-07 PROCEDURE — 99499 UNLISTED E&M SERVICE: CPT | Mod: 25,S$PBB,, | Performed by: PHYSICIAN ASSISTANT

## 2024-05-07 PROCEDURE — 20610 DRAIN/INJ JOINT/BURSA W/O US: CPT | Mod: S$PBB,79,LT, | Performed by: PHYSICIAN ASSISTANT

## 2024-05-07 PROCEDURE — 20610 DRAIN/INJ JOINT/BURSA W/O US: CPT | Mod: PBBFAC,PN | Performed by: PHYSICIAN ASSISTANT

## 2024-05-07 PROCEDURE — 99999PBSHW PR PBB SHADOW TECHNICAL ONLY FILED TO HB: Mod: JZ,PBBFAC,,

## 2024-05-07 RX ADMIN — Medication 16.8 MG: at 10:05

## 2024-05-07 NOTE — PROGRESS NOTES
Subjective:      Patient ID: Taty Kenney is a 64 y.o. female.    Chief Complaint: Pain and Injections of the Left Knee      Patient is here for Second Gelsyn3 injection(s) for left knee osteoarthritis. Patient tolerated last injection well. No new complaints today.          Review of Systems   Constitutional: Negative for chills and fever.   Cardiovascular:  Negative for chest pain.   Respiratory:  Negative for cough.    Hematologic/Lymphatic: Does not bruise/bleed easily.   Skin:  Negative for poor wound healing and rash.   Musculoskeletal:  Positive for joint pain, myalgias and stiffness.   Gastrointestinal:  Negative for abdominal pain.   Genitourinary:  Negative for bladder incontinence.   Neurological:  Negative for dizziness, loss of balance and weakness.   Psychiatric/Behavioral:  Negative for altered mental status.        Review of patient's allergies indicates:   Allergen Reactions    Augmentin [amoxicillin-pot clavulanate] Other (See Comments)     Stomach cramps    Shellfish containing products Hives        Current Outpatient Medications   Medication Sig Dispense Refill    amLODIPine (NORVASC) 5 MG tablet Take 5 mg by mouth once daily.      celecoxib (CELEBREX) 200 MG capsule Take 1 capsule (200 mg total) by mouth 2 (two) times daily. 60 capsule 0    FLUoxetine 40 MG capsule Take 40 mg by mouth.      LORazepam (ATIVAN) 0.5 MG tablet Take 0.5 mg by mouth every 6 (six) hours as needed for Anxiety.      omeprazole (PRILOSEC) 40 MG capsule Take 40 mg by mouth.      ondansetron (ZOFRAN-ODT) 4 MG TbDL Take 2 tablets (8 mg total) by mouth every 8 (eight) hours as needed (nausea). 15 tablet 0    QUEtiapine (SEROQUEL) 400 MG tablet        No current facility-administered medications for this visit.     Facility-Administered Medications Ordered in Other Visits   Medication Dose Route Frequency Provider Last Rate Last Admin    0.9%  NaCl infusion   Intravenous Continuous Triston Naidu IV, MD   New Bag at  "03/04/24 1619    lidocaine HCL 10 mg/ml (1%) injection 1 mL  1 mL Intradermal Once PRN Triston Naidu IV, MD        oxyCODONE immediate release tablet 5 mg  5 mg Oral Q3H PRN Raheel Moran MD   5 mg at 03/04/24 1752    prochlorperazine injection Soln 5 mg  5 mg Intravenous Q30 Min PRN Raheel Moran MD        sodium chloride 0.9% flush 10 mL  10 mL Intravenous Daily PRN Raheel Moran MD            The patient's relevant past medical, surgical, and social history was reviewed in Epic.       Objective:      VITAL SIGNS: Ht 5' 6" (1.676 m)   Wt 103.9 kg (229 lb 0.9 oz)   BMI 36.97 kg/m²     Ortho/SPM Exam         Assessment:       1. Primary osteoarthritis of left knee          Plan:         Taty was seen today for pain and injections.    Diagnoses and all orders for this visit:    Primary osteoarthritis of left knee  -     Large Joint Aspiration/Injection: L knee    I injected the left knee with one dose of Gelsyn3 today.  We will see the patient back next week or as needed.      Diagnoses and plan discussed with the patient, as well as the expected course and duration of his symptoms.  All questions and concerns were addressed prior to the end of the visit.   Instructed patient to call office if they have any future questions/concerns or to schedule apt. Patient will return to see me if symptoms worsen or fail to improve    Note dictated with voice recognition software, please excuse any grammatical errors.        Nikki Dukes PA-C   05/07/2024    "

## 2024-05-07 NOTE — PROCEDURES
Large Joint Aspiration/Injection: L knee    Date/Time: 5/7/2024 10:45 AM    Performed by: Nikki Dukes PA-C  Authorized by: Nikki Dukes PA-C    Consent Done?:  Yes (Verbal)  Indications:  Pain  Site marked: the procedure site was marked    Timeout: prior to procedure the correct patient, procedure, and site was verified    Prep: patient was prepped and draped in usual sterile fashion    Local anesthesia used?: No    Local anesthetic:  Topical anesthetic    Details:  Needle Size:  22 G  Ultrasonic Guidance for needle placement?: No    Approach:  Anterolateral  Location:  Knee  Site:  L knee  Medications:  16.8 mg sodium hyaluronate 16.8 mg/2 mL  Patient tolerance:  Patient tolerated the procedure well with no immediate complications

## 2024-05-21 ENCOUNTER — OFFICE VISIT (OUTPATIENT)
Dept: ORTHOPEDICS | Facility: CLINIC | Age: 64
End: 2024-05-21
Payer: MEDICARE

## 2024-05-21 VITALS — BODY MASS INDEX: 36.81 KG/M2 | WEIGHT: 229.06 LBS | HEIGHT: 66 IN

## 2024-05-21 DIAGNOSIS — M25.562 CHRONIC PAIN OF LEFT KNEE: ICD-10-CM

## 2024-05-21 DIAGNOSIS — G89.29 CHRONIC PAIN OF LEFT KNEE: ICD-10-CM

## 2024-05-21 DIAGNOSIS — M17.12 PRIMARY OSTEOARTHRITIS OF LEFT KNEE: Primary | ICD-10-CM

## 2024-05-21 PROCEDURE — 99999PBSHW PR PBB SHADOW TECHNICAL ONLY FILED TO HB: Mod: JZ,PBBFAC,,

## 2024-05-21 PROCEDURE — 99999 PR PBB SHADOW E&M-EST. PATIENT-LVL III: CPT | Mod: PBBFAC,,, | Performed by: PHYSICIAN ASSISTANT

## 2024-05-21 PROCEDURE — 20610 DRAIN/INJ JOINT/BURSA W/O US: CPT | Mod: PBBFAC,PN | Performed by: PHYSICIAN ASSISTANT

## 2024-05-21 PROCEDURE — 99213 OFFICE O/P EST LOW 20 MIN: CPT | Mod: PBBFAC,PN,25 | Performed by: PHYSICIAN ASSISTANT

## 2024-05-21 PROCEDURE — 20610 DRAIN/INJ JOINT/BURSA W/O US: CPT | Mod: S$PBB,79,LT, | Performed by: PHYSICIAN ASSISTANT

## 2024-05-21 PROCEDURE — 99499 UNLISTED E&M SERVICE: CPT | Mod: 25,S$PBB,, | Performed by: PHYSICIAN ASSISTANT

## 2024-05-21 RX ADMIN — Medication 16.8 MG: at 02:05

## 2024-05-21 NOTE — PROCEDURES
Large Joint Aspiration/Injection: L knee    Date/Time: 5/21/2024 2:30 PM    Performed by: Nikki Dukes PA-C  Authorized by: Nikki Dukes PA-C    Consent Done?:  Yes (Verbal)  Indications:  Pain  Site marked: the procedure site was marked    Timeout: prior to procedure the correct patient, procedure, and site was verified    Prep: patient was prepped and draped in usual sterile fashion    Local anesthesia used?: No    Local anesthetic:  Topical anesthetic    Details:  Needle Size:  22 G  Ultrasonic Guidance for needle placement?: No    Approach:  Anterolateral  Location:  Knee  Site:  L knee  Medications:  16.8 mg sodium hyaluronate 16.8 mg/2 mL  Patient tolerance:  Patient tolerated the procedure well with no immediate complications

## 2024-05-21 NOTE — PROGRESS NOTES
Subjective:      Patient ID: Taty Kenney is a 64 y.o. female.    Chief Complaint: Pain and Injections of the Left Knee      Patient is here for Third Gelsyn3 injection(s) for left knee osteoarthritis. Skipped last weeks injection. No new complaints today.           Review of Systems   Constitutional: Negative for chills and fever.   Cardiovascular:  Negative for chest pain.   Respiratory:  Negative for cough.    Hematologic/Lymphatic: Does not bruise/bleed easily.   Skin:  Negative for poor wound healing and rash.   Musculoskeletal:  Positive for arthritis, joint pain, myalgias and stiffness.   Gastrointestinal:  Negative for abdominal pain.   Genitourinary:  Negative for bladder incontinence.   Neurological:  Negative for dizziness, loss of balance and weakness.   Psychiatric/Behavioral:  Negative for altered mental status.        Review of patient's allergies indicates:   Allergen Reactions    Augmentin [amoxicillin-pot clavulanate] Other (See Comments)     Stomach cramps    Shellfish containing products Hives        Current Outpatient Medications   Medication Sig Dispense Refill    amLODIPine (NORVASC) 5 MG tablet Take 5 mg by mouth once daily.      celecoxib (CELEBREX) 200 MG capsule Take 1 capsule (200 mg total) by mouth 2 (two) times daily. 60 capsule 0    FLUoxetine 40 MG capsule Take 40 mg by mouth.      LORazepam (ATIVAN) 0.5 MG tablet Take 0.5 mg by mouth every 6 (six) hours as needed for Anxiety.      omeprazole (PRILOSEC) 40 MG capsule Take 40 mg by mouth.      ondansetron (ZOFRAN-ODT) 4 MG TbDL Take 2 tablets (8 mg total) by mouth every 8 (eight) hours as needed (nausea). 15 tablet 0    QUEtiapine (SEROQUEL) 400 MG tablet        No current facility-administered medications for this visit.     Facility-Administered Medications Ordered in Other Visits   Medication Dose Route Frequency Provider Last Rate Last Admin    0.9%  NaCl infusion   Intravenous Continuous Triston Naidu IV, MD   New Bag at  03/04/24 1619    lidocaine HCL 10 mg/ml (1%) injection 1 mL  1 mL Intradermal Once PRN Triston Naidu IV, MD        oxyCODONE immediate release tablet 5 mg  5 mg Oral Q3H PRN Raheel Moran MD   5 mg at 03/04/24 1752    prochlorperazine injection Soln 5 mg  5 mg Intravenous Q30 Min PRN Raheel Moran MD        sodium chloride 0.9% flush 10 mL  10 mL Intravenous Daily PRN Raheel Moran MD            The patient's relevant past medical, surgical, and social history was reviewed in Epic.       Objective:      VITAL SIGNS: There were no vitals taken for this visit.    Ortho/SPM Exam         Assessment:       1. Primary osteoarthritis of left knee    2. Chronic pain of left knee          Plan:         Taty was seen today for pain and injections.    Diagnoses and all orders for this visit:    Primary osteoarthritis of left knee  -     Large Joint Aspiration/Injection: L knee    Chronic pain of left knee  -     Large Joint Aspiration/Injection: L knee    I injected the left knee with one dose of Gelsyn3 today.  We will see the patient back in 6 mos or as needed.      Diagnoses and plan discussed with the patient, as well as the expected course and duration of his symptoms.  All questions and concerns were addressed prior to the end of the visit.   Instructed patient to call office if they have any future questions/concerns or to schedule apt. Patient will return to see me if symptoms worsen or fail to improve    Note dictated with voice recognition software, please excuse any grammatical errors.        Nikki Dukes PA-C   05/21/2024

## 2024-09-24 ENCOUNTER — TELEPHONE (OUTPATIENT)
Dept: ORTHOPEDICS | Facility: CLINIC | Age: 64
End: 2024-09-24
Payer: MEDICARE

## 2024-09-24 DIAGNOSIS — M17.12 PRIMARY OSTEOARTHRITIS OF LEFT KNEE: Primary | ICD-10-CM

## 2024-09-24 NOTE — TELEPHONE ENCOUNTER
----- Message from Ashlyn Morataya sent at 9/24/2024  9:54 AM CDT -----  Regarding: Call back  Contact: [phone]  Who Called: PT     Patient called in requesting to speak with you. Did not specify why just said she is still having pain and wants to know if she can get an injection. Please advise.

## 2024-09-24 NOTE — TELEPHONE ENCOUNTER
Spoke with patient.  Would like gel injection again in left knee.  Also stated she has new insurance. Informed her to call billing with that information. Will have Anton order Gelsyn and contact  patient with appt.

## 2024-10-02 ENCOUNTER — OFFICE VISIT (OUTPATIENT)
Dept: ORTHOPEDICS | Facility: CLINIC | Age: 64
End: 2024-10-02
Payer: MEDICARE

## 2024-10-02 VITALS — HEIGHT: 66 IN | WEIGHT: 229.06 LBS | BODY MASS INDEX: 36.81 KG/M2

## 2024-10-02 DIAGNOSIS — M17.12 PRIMARY OSTEOARTHRITIS OF LEFT KNEE: Primary | ICD-10-CM

## 2024-10-02 PROCEDURE — 20610 DRAIN/INJ JOINT/BURSA W/O US: CPT | Mod: LT,S$GLB,, | Performed by: PHYSICIAN ASSISTANT

## 2024-10-02 PROCEDURE — 99499 UNLISTED E&M SERVICE: CPT | Mod: 25,S$GLB,, | Performed by: PHYSICIAN ASSISTANT

## 2024-10-02 PROCEDURE — 99999 PR PBB SHADOW E&M-EST. PATIENT-LVL III: CPT | Mod: PBBFAC,,, | Performed by: PHYSICIAN ASSISTANT

## 2024-10-02 NOTE — PROCEDURES
Large Joint Aspiration/Injection: L knee    Date/Time: 10/2/2024 2:45 PM    Performed by: Nikki Dukes PA-C  Authorized by: Nikki Dukes PA-C    Consent Done?:  Yes (Verbal)  Indications:  Pain  Site marked: the procedure site was marked    Timeout: prior to procedure the correct patient, procedure, and site was verified    Prep: patient was prepped and draped in usual sterile fashion    Local anesthesia used?: No    Local anesthetic:  Topical anesthetic    Details:  Needle Size:  22 G  Ultrasonic Guidance for needle placement?: No    Approach:  Anterolateral  Location:  Knee  Site:  L knee  Medications:  16.8 mg sodium hyaluronate 16.8 mg/2 mL  Patient tolerance:  Patient tolerated the procedure well with no immediate complications

## 2024-10-02 NOTE — PROGRESS NOTES
Subjective:      Chief Complaint: Pain of the Left Knee and Injections (L Gelsyn #1)    Patient ID: Taty Kenney is a 64 y.o. female.  Patient is here for First Gelsyn3 injection(s) for left knee osteoarthritis. Patient tolerated last injection well. No new complaints today.          Past Medical History:   Diagnosis Date    Anxiety     Arthritis     Depression     Fractures     Headache     Hypertension     Movement disorder     Multiple sclerosis     Neuropathy         Past Surgical History:   Procedure Laterality Date    ABDOMINAL SURGERY      ANKLE FRACTURE SURGERY      ANKLE HARDWARE REMOVAL Left 10/6/2020    Procedure: REMOVAL, HARDWARE, ANKLE, medial and lateral ankle plates;  Surgeon: Triston Naidu IV, MD;  Location: [facility] OR;  Service: Orthopedics;  Laterality: Left;  shannon screw removal tray (Zac notified per Carly 9/29, EF)confirmed 10/5 CW  hardware removal tray    ARTHROSCOPY OF KNEE Left 3/4/2024    Procedure: ARTHROSCOPY, KNEE;  Surgeon: Alex Mccabe MD;  Location: [facility] OR;  Service: Orthopedics;  Laterality: Left;    FOOT SURGERY      HYSTERECTOMY      INSERTION, ORTHOPEDIC PIN OR SCREW, PERCUTANEOUS Left 3/4/2024    Procedure: INSERTION, ORTHOPEDIC PIN OR SCREW, PERCUTANEOUS;  Surgeon: Alex Mccabe MD;  Location: [facility] OR;  Service: Orthopedics;  Laterality: Left;  tactoset, ossio    IRRIGATION AND DEBRIDEMENT OF LOWER EXTREMITY Left 7/24/2020    Procedure: LEFT ANKLE INFECTION IRRIGATION AND DEBRIDEMENT, possible External fixation;  Surgeon: Triston Naidu IV, MD;  Location: [facility] OR;  Service: Orthopedics;  Laterality: Left;  correct card and give to Shari  Pulse Lavage and large bone or ortho minor tray- to start case  Zac- Maykel to bring ankle set and ex fix - needed in case of removal of harware-Confrimed zac- AM 07/23    OPEN REDUCTION AND INTERNAL FIXATION (ORIF) OF INJURY OF ANKLE Left 7/7/2020    Procedure: ORIF, ANKLE;  Surgeon: Triston Naidu IV, MD;  Location: [facility] OR;   "Service: Orthopedics;  Laterality: Left;  GENERAL + REGIONAL  ORTHO MAJOR TRAY  BONE FOAM RAMP  FLUOROSCOPY  Hair - Mohamud Lopez confirmed CW 7/6        Current Outpatient Medications   Medication Instructions    amLODIPine (NORVASC) 5 mg, Daily    celecoxib (CELEBREX) 200 mg, Oral, 2 times daily    FLUoxetine 40 mg    LORazepam (ATIVAN) 0.5 mg, Every 6 hours PRN    omeprazole (PRILOSEC) 40 mg    ondansetron (ZOFRAN-ODT) 8 mg, Oral, Every 8 hours PRN    QUEtiapine (SEROQUEL) 400 MG tablet No dose, route, or frequency recorded.        Review of patient's allergies indicates:   Allergen Reactions    Augmentin [amoxicillin-pot clavulanate] Other (See Comments)     Stomach cramps    Shellfish containing products Hives       Review of Systems   Constitutional: Negative for fever and malaise/fatigue.   Eyes:  Negative for blurred vision.   Cardiovascular:  Negative for chest pain.   Respiratory:  Negative for shortness of breath.    Skin:  Negative for poor wound healing.   Musculoskeletal:  Positive for joint pain and myalgias.   Genitourinary:  Negative for bladder incontinence.   Neurological:  Negative for dizziness, numbness and paresthesias.   Psychiatric/Behavioral:  Negative for altered mental status.        The patient's relevant past medical, surgical, and social history was reviewed in Epic.       Objective:      VITAL SIGNS: Ht 5' 6" (1.676 m)   Wt 103.9 kg (229 lb 0.9 oz)   BMI 36.97 kg/m²     Ortho/SPM Exam     Imaging          Assessment:       Taty Kenney is a 64 y.o. female seen in the office today for   1. Primary osteoarthritis of left knee           Plan:       Taty was seen today for pain and injections.    Diagnoses and all orders for this visit:    Primary osteoarthritis of left knee  -     Large Joint Aspiration/Injection: L knee    I injected the left knee with one dose of Gelsyn3 today.  We will see the patient back next week or as needed.       Diagnoses and plan discussed with the " patient, as well as the expected course and duration of his symptoms.  All questions and concerns were addressed prior to the end of the visit.   Instructed patient to call office if they have any future questions/concerns or to schedule apt. Patient will return to see me if symptoms worsen or fail to improve    Note dictated with voice recognition software, please excuse any grammatical errors.        Nikki Dukes PA-C      Department of Orthopedic Surgery  [facility]  Office: [phone]  10/02/2024

## 2024-10-09 ENCOUNTER — OFFICE VISIT (OUTPATIENT)
Dept: ORTHOPEDICS | Facility: CLINIC | Age: 64
End: 2024-10-09
Payer: MEDICARE

## 2024-10-09 VITALS — HEIGHT: 66 IN | WEIGHT: 229.06 LBS | BODY MASS INDEX: 36.81 KG/M2

## 2024-10-09 DIAGNOSIS — M17.12 PRIMARY OSTEOARTHRITIS OF LEFT KNEE: Primary | ICD-10-CM

## 2024-10-09 PROCEDURE — 99999 PR PBB SHADOW E&M-EST. PATIENT-LVL III: CPT | Mod: PBBFAC,,, | Performed by: PHYSICIAN ASSISTANT

## 2024-10-09 PROCEDURE — 20610 DRAIN/INJ JOINT/BURSA W/O US: CPT | Mod: LT,S$GLB,, | Performed by: PHYSICIAN ASSISTANT

## 2024-10-09 PROCEDURE — 99499 UNLISTED E&M SERVICE: CPT | Mod: 25,S$GLB,, | Performed by: PHYSICIAN ASSISTANT

## 2024-10-09 NOTE — PROGRESS NOTES
Subjective:      Chief Complaint: Pain of the Left Knee and Injections (L Gelsyn #2)    Patient ID: Taty Kenney is a 64 y.o. female.  Patient is here for Second Gelsyn3 injection(s) for left knee osteoarthritis. Patient tolerated last injection well. No new complaints today.          Past Medical History:   Diagnosis Date    Anxiety     Arthritis     Depression     Fractures     Headache     Hypertension     Movement disorder     Multiple sclerosis     Neuropathy         Past Surgical History:   Procedure Laterality Date    ABDOMINAL SURGERY      ANKLE FRACTURE SURGERY      ANKLE HARDWARE REMOVAL Left 10/6/2020    Procedure: REMOVAL, HARDWARE, ANKLE, medial and lateral ankle plates;  Surgeon: Triston Naidu IV, MD;  Location: [facility] OR;  Service: Orthopedics;  Laterality: Left;  shannon screw removal tray (Zac notified per Carly 9/29, EF)confirmed 10/5 CW  hardware removal tray    ARTHROSCOPY OF KNEE Left 3/4/2024    Procedure: ARTHROSCOPY, KNEE;  Surgeon: Alex Mccabe MD;  Location: [facility] OR;  Service: Orthopedics;  Laterality: Left;    FOOT SURGERY      HYSTERECTOMY      INSERTION, ORTHOPEDIC PIN OR SCREW, PERCUTANEOUS Left 3/4/2024    Procedure: INSERTION, ORTHOPEDIC PIN OR SCREW, PERCUTANEOUS;  Surgeon: Alex Mccabe MD;  Location: [facility] OR;  Service: Orthopedics;  Laterality: Left;  tactoset, ossio    IRRIGATION AND DEBRIDEMENT OF LOWER EXTREMITY Left 7/24/2020    Procedure: LEFT ANKLE INFECTION IRRIGATION AND DEBRIDEMENT, possible External fixation;  Surgeon: Triston Naidu IV, MD;  Location: [facility] OR;  Service: Orthopedics;  Laterality: Left;  correct card and give to Shari  Pulse Lavage and large bone or ortho minor tray- to start case  Zac- Maykel to bring ankle set and ex fix - needed in case of removal of harware-Confrimed zac- AM 07/23    OPEN REDUCTION AND INTERNAL FIXATION (ORIF) OF INJURY OF ANKLE Left 7/7/2020    Procedure: ORIF, ANKLE;  Surgeon: Triston Naidu IV, MD;  Location: [facility] OR;   "Service: Orthopedics;  Laterality: Left;  GENERAL + REGIONAL  ORTHO MAJOR TRAY  BONE FOAM RAMP  FLUOROSCOPY  Hair - Mohamud Lopez confirmed CW 7/6        Current Outpatient Medications   Medication Instructions    amLODIPine (NORVASC) 5 mg, Daily    celecoxib (CELEBREX) 200 mg, Oral, 2 times daily    FLUoxetine 40 mg    LORazepam (ATIVAN) 0.5 mg, Every 6 hours PRN    omeprazole (PRILOSEC) 40 mg    ondansetron (ZOFRAN-ODT) 8 mg, Oral, Every 8 hours PRN    QUEtiapine (SEROQUEL) 400 MG tablet No dose, route, or frequency recorded.        Review of patient's allergies indicates:   Allergen Reactions    Augmentin [amoxicillin-pot clavulanate] Other (See Comments)     Stomach cramps    Shellfish containing products Hives       Review of Systems   Constitutional: Negative for fever and malaise/fatigue.   Eyes:  Negative for blurred vision.   Cardiovascular:  Negative for chest pain.   Respiratory:  Negative for shortness of breath.    Skin:  Negative for poor wound healing.   Musculoskeletal:  Positive for joint pain and myalgias.   Genitourinary:  Negative for bladder incontinence.   Neurological:  Negative for dizziness, numbness and paresthesias.   Psychiatric/Behavioral:  Negative for altered mental status.        The patient's relevant past medical, surgical, and social history was reviewed in Epic.       Objective:      VITAL SIGNS: Ht 5' 6" (1.676 m)   Wt 103.9 kg (229 lb 0.9 oz)   BMI 36.97 kg/m²     Ortho/SPM Exam     Imaging          Assessment:       Taty Kenney is a 64 y.o. female seen in the office today for   1. Primary osteoarthritis of left knee           Plan:       Taty was seen today for pain and injections.    Diagnoses and all orders for this visit:    Primary osteoarthritis of left knee  -     Large Joint Aspiration/Injection: L knee    I injected the left knee with one dose of Gelsyn3 today.  We will see the patient back next week or as needed.       Diagnoses and plan discussed with the " patient, as well as the expected course and duration of his symptoms.  All questions and concerns were addressed prior to the end of the visit.   Instructed patient to call office if they have any future questions/concerns or to schedule apt. Patient will return to see me if symptoms worsen or fail to improve    Note dictated with voice recognition software, please excuse any grammatical errors.        Nikki Dukes PA-C      Department of Orthopedic Surgery  [facility]  Office: [phone]  10/09/2024

## 2024-10-09 NOTE — PROCEDURES
Large Joint Aspiration/Injection: L knee    Date/Time: 10/9/2024 1:30 PM    Performed by: Nikki Dukes PA-C  Authorized by: Nikki Dukes PA-C    Consent Done?:  Yes (Verbal)  Indications:  Pain  Site marked: the procedure site was marked    Timeout: prior to procedure the correct patient, procedure, and site was verified    Prep: patient was prepped and draped in usual sterile fashion    Local anesthesia used?: No    Local anesthetic:  Topical anesthetic    Details:  Needle Size:  22 G  Ultrasonic Guidance for needle placement?: No    Approach:  Anterolateral  Location:  Knee  Site:  L knee  Medications:  16.8 mg sodium hyaluronate 16.8 mg/2 mL  Patient tolerance:  Patient tolerated the procedure well with no immediate complications

## 2024-10-16 ENCOUNTER — TELEPHONE (OUTPATIENT)
Dept: ORTHOPEDICS | Facility: CLINIC | Age: 64
End: 2024-10-16
Payer: MEDICARE

## 2024-10-16 NOTE — TELEPHONE ENCOUNTER
----- Message from Edgar sent at 10/16/2024  8:03 AM CDT -----  Type:   Apoointment Request    Caller is requesting an  appointment.    Name of Caller:pt   When is the first available appointment?none  Symptoms:Rescheduling injection from 10/16, pt was sick   Would the patient rather a call back or a response via [facility]? Call   Best Call Back Number:[phone]  Additional Information:

## 2024-10-22 ENCOUNTER — OFFICE VISIT (OUTPATIENT)
Dept: ORTHOPEDICS | Facility: CLINIC | Age: 64
End: 2024-10-22
Payer: MEDICARE

## 2024-10-22 VITALS — HEIGHT: 66 IN | WEIGHT: 198.44 LBS | BODY MASS INDEX: 31.89 KG/M2

## 2024-10-22 DIAGNOSIS — M17.12 PRIMARY OSTEOARTHRITIS OF LEFT KNEE: ICD-10-CM

## 2024-10-22 DIAGNOSIS — Z87.81 H/O FRACTURE OF ANKLE: ICD-10-CM

## 2024-10-22 DIAGNOSIS — R20.0 NUMBNESS OF LEFT FOOT: Primary | ICD-10-CM

## 2024-10-22 PROCEDURE — 99499 UNLISTED E&M SERVICE: CPT | Mod: 25,S$GLB,, | Performed by: PHYSICIAN ASSISTANT

## 2024-10-22 PROCEDURE — 20610 DRAIN/INJ JOINT/BURSA W/O US: CPT | Mod: LT,S$GLB,, | Performed by: PHYSICIAN ASSISTANT

## 2024-10-22 PROCEDURE — 99999 PR PBB SHADOW E&M-EST. PATIENT-LVL III: CPT | Mod: PBBFAC,,, | Performed by: PHYSICIAN ASSISTANT

## 2024-10-22 NOTE — PROCEDURES
Large Joint Aspiration/Injection: L knee    Date/Time: 10/22/2024 9:00 AM    Performed by: Nikki Dukes PA-C  Authorized by: Nikki Dukes PA-C    Consent Done?:  Yes (Verbal)  Indications:  Pain  Site marked: the procedure site was marked    Timeout: prior to procedure the correct patient, procedure, and site was verified    Prep: patient was prepped and draped in usual sterile fashion    Local anesthesia used?: No    Local anesthetic:  Topical anesthetic    Details:  Needle Size:  22 G  Ultrasonic Guidance for needle placement?: No    Approach:  Anterolateral  Location:  Knee  Site:  L knee  Medications:  16.8 mg sodium hyaluronate 16.8 mg/2 mL  Patient tolerance:  Patient tolerated the procedure well with no immediate complications

## 2024-10-22 NOTE — PROGRESS NOTES
Subjective:      Chief Complaint: Pain of the Left Knee and Injections (L Gelsyn#3)    Patient ID: Taty Kenney is a 64 y.o. female.  Patient is here for Third Gelsyn3 injection(s) for left knee osteoarthritis. Patient tolerated last injection well.   States she has had numbness in her left foot/ankle. Has history of ORIF left ankle and radicular back pain to her left foot. Would like a referral.         Past Medical History:   Diagnosis Date    Anxiety     Arthritis     Depression     Fractures     Headache     Hypertension     Movement disorder     Multiple sclerosis     Neuropathy         Past Surgical History:   Procedure Laterality Date    ABDOMINAL SURGERY      ANKLE FRACTURE SURGERY      ANKLE HARDWARE REMOVAL Left 10/6/2020    Procedure: REMOVAL, HARDWARE, ANKLE, medial and lateral ankle plates;  Surgeon: Triston Naidu IV, MD;  Location: [facility] OR;  Service: Orthopedics;  Laterality: Left;  shannon screw removal tray (Zac notified per Carly 9/29, )confirmed 10/5 CW  hardware removal tray    ARTHROSCOPY OF KNEE Left 3/4/2024    Procedure: ARTHROSCOPY, KNEE;  Surgeon: Alex Mccabe MD;  Location: [facility] OR;  Service: Orthopedics;  Laterality: Left;    FOOT SURGERY      HYSTERECTOMY      INSERTION, ORTHOPEDIC PIN OR SCREW, PERCUTANEOUS Left 3/4/2024    Procedure: INSERTION, ORTHOPEDIC PIN OR SCREW, PERCUTANEOUS;  Surgeon: Alex Mccabe MD;  Location: [facility] OR;  Service: Orthopedics;  Laterality: Left;  tactoset, ossio    IRRIGATION AND DEBRIDEMENT OF LOWER EXTREMITY Left 7/24/2020    Procedure: LEFT ANKLE INFECTION IRRIGATION AND DEBRIDEMENT, possible External fixation;  Surgeon: Triston Naidu IV, MD;  Location: [facility] OR;  Service: Orthopedics;  Laterality: Left;  correct card and give to Shari  Pulse Lavage and large bone or ortho minor tray- to start case  Zac- Maykel to bring ankle set and ex fix - needed in case of removal of harware-Confrimed zac- AM 07/23    OPEN REDUCTION AND INTERNAL FIXATION  "(ORIF) OF INJURY OF ANKLE Left 7/7/2020    Procedure: ORIF, ANKLE;  Surgeon: Triston Naidu IV, MD;  Location: [facility];  Service: Orthopedics;  Laterality: Left;  GENERAL + REGIONAL  ORTHO MAJOR TRAY  BONE FOAM RAMP  FLUOROSCOPY  Hair - Mohamud Lopez confirmed CW 7/6        Current Outpatient Medications   Medication Instructions    amLODIPine (NORVASC) 5 mg, Daily    celecoxib (CELEBREX) 200 mg, Oral, 2 times daily    FLUoxetine 40 mg    LORazepam (ATIVAN) 0.5 mg, Every 6 hours PRN    omeprazole (PRILOSEC) 40 mg    ondansetron (ZOFRAN-ODT) 8 mg, Oral, Every 8 hours PRN    QUEtiapine (SEROQUEL) 400 MG tablet No dose, route, or frequency recorded.        Review of patient's allergies indicates:   Allergen Reactions    Augmentin [amoxicillin-pot clavulanate] Other (See Comments)     Stomach cramps    Shellfish containing products Hives       Review of Systems   Constitutional: Negative for fever and malaise/fatigue.   Eyes:  Negative for blurred vision.   Cardiovascular:  Negative for chest pain.   Respiratory:  Negative for shortness of breath.    Skin:  Negative for poor wound healing.   Musculoskeletal:  Positive for joint pain and myalgias.   Genitourinary:  Negative for bladder incontinence.   Neurological:  Negative for dizziness, numbness and paresthesias.   Psychiatric/Behavioral:  Negative for altered mental status.        The patient's relevant past medical, surgical, and social history was reviewed in Epic.       Objective:      VITAL SIGNS: Ht 5' 6" (1.676 m)   Wt 90 kg (198 lb 6.6 oz)   BMI 32.02 kg/m²     Ortho/SPM Exam     Imaging          Assessment:       Taty Kenney is a 64 y.o. female seen in the office today for   1. Numbness of left foot    2. H/O fracture of ankle    3. Primary osteoarthritis of left knee           Plan:       Taty was seen today for pain and injections.    Diagnoses and all orders for this visit:    Numbness of left foot  -     Ambulatory referral/consult to " Orthopedics; Future    H/O fracture of ankle  -     Ambulatory referral/consult to Orthopedics; Future    Primary osteoarthritis of left knee  -     Large Joint Aspiration/Injection: L knee      I injected the left knee with one dose of Gelsyn3 today.  We will see the patient back in 6 mos or as needed.   Will refer to foot/ankle specialist for LE paresthesias.       Diagnoses and plan discussed with the patient, as well as the expected course and duration of his symptoms.  All questions and concerns were addressed prior to the end of the visit.   Instructed patient to call office if they have any future questions/concerns or to schedule apt. Patient will return to see me if symptoms worsen or fail to improve    Note dictated with voice recognition software, please excuse any grammatical errors.        Nikki Dukes PA-C      Department of Orthopedic Surgery  [facility]  Office: [phone]  10/22/2024

## 2024-11-11 NOTE — PLAN OF CARE
POC reviewed with pt and pt's family. Pt and pt's family verbalize understanding. Safety precautions maintained. Call bell in reach.  Bed locked and in lowest position. Instructed pt to call for assistance. Pt verbalizes understanding.        
Patient discharge criteria met. IV removed per order with catheter intact.  Patient voided per protocol.  Pain well controlled, VSS.  Patient given discharge instructions, verbalized understanding and able to teach back.  No complications noted.    
Price (Do Not Change): 0.00
Detail Level: Simple
Instructions: This plan will send the code FBSD to the PM system.  DO NOT or CHANGE the price.

## 2024-11-20 DIAGNOSIS — Z87.81 H/O FRACTURE OF ANKLE: Primary | ICD-10-CM

## 2024-11-20 DIAGNOSIS — R20.0 NUMBNESS OF LEFT FOOT: ICD-10-CM

## 2024-11-25 ENCOUNTER — HOSPITAL ENCOUNTER (OUTPATIENT)
Dept: RADIOLOGY | Facility: HOSPITAL | Age: 64
Discharge: HOME | End: 2024-11-25
Attending: SURGERY
Payer: MEDICARE

## 2024-11-25 ENCOUNTER — OFFICE VISIT (OUTPATIENT)
Dept: ORTHOPEDICS | Facility: CLINIC | Age: 64
End: 2024-11-25
Payer: MEDICARE

## 2024-11-25 DIAGNOSIS — Z87.81 H/O FRACTURE OF ANKLE: ICD-10-CM

## 2024-11-25 DIAGNOSIS — R20.0 NUMBNESS OF LEFT FOOT: ICD-10-CM

## 2024-11-25 PROCEDURE — 73630 X-RAY EXAM OF FOOT: CPT | Mod: 26,LT,, | Performed by: INTERNAL MEDICINE

## 2024-11-25 PROCEDURE — 99999 PR PBB SHADOW E&M-EST. PATIENT-LVL II: CPT | Mod: PBBFAC,,, | Performed by: SURGERY

## 2024-11-25 PROCEDURE — 73610 X-RAY EXAM OF ANKLE: CPT | Mod: TC,PN,LT

## 2024-11-25 PROCEDURE — 73630 X-RAY EXAM OF FOOT: CPT | Mod: TC,PN,LT

## 2024-11-25 PROCEDURE — 73610 X-RAY EXAM OF ANKLE: CPT | Mod: 26,LT,, | Performed by: INTERNAL MEDICINE

## 2024-11-25 RX ORDER — MECLIZINE HYDROCHLORIDE 25 MG/1
25 TABLET ORAL 2 TIMES DAILY PRN
COMMUNITY
Start: 2024-11-21

## 2024-11-25 RX ORDER — TRIAMCINOLONE ACETONIDE 40 MG/ML
40 INJECTION, SUSPENSION INTRA-ARTICULAR; INTRAMUSCULAR
Status: DISCONTINUED | OUTPATIENT
Start: 2024-11-25 | End: 2024-11-25 | Stop reason: HOSPADM

## 2024-11-25 RX ORDER — MULTIVITAMIN
1 TABLET ORAL DAILY
COMMUNITY
Start: 2024-08-07 | End: 2025-08-07

## 2024-11-25 RX ORDER — HYDROXYZINE PAMOATE 50 MG/1
50 CAPSULE ORAL 3 TIMES DAILY
COMMUNITY
Start: 2024-10-26

## 2024-11-25 RX ORDER — NALTREXONE HYDROCHLORIDE 50 MG/1
50 TABLET, FILM COATED ORAL
COMMUNITY
Start: 2024-11-24

## 2024-11-25 RX ORDER — SERTRALINE HYDROCHLORIDE 100 MG/1
100 TABLET, FILM COATED ORAL
COMMUNITY

## 2024-11-25 RX ORDER — GABAPENTIN 100 MG/1
1 CAPSULE ORAL 2 TIMES DAILY
COMMUNITY
Start: 2024-04-15 | End: 2025-04-15

## 2024-11-25 RX ADMIN — TRIAMCINOLONE ACETONIDE 40 MG: 40 INJECTION, SUSPENSION INTRA-ARTICULAR; INTRAMUSCULAR at 08:11

## 2024-11-25 NOTE — PROGRESS NOTES
Patient ID: Taty Kenney is a 64 y.o. female.    Chief Complaint: Numbness of the Left Foot and Consult    HPI     Taty Kenney has experienced problems with the left foot over the past 2 years. The patient reports relevant history of injury/aggravation.  Patient reports of having a left trimalleolar ORIF performed by Dr. Nadiu in 2020.  Since the surgery she has reported numbness over the anterior lateral aspect of foot.  She presents to clinic today with concerns of continuous numbness  Pain is located  lateral foot. Associated symptoms include  numbness .  Symptoms are aggravated by sit to stand, weight-bearing. They have been treated with  rest, ice. Ambulation reportedly has been impaired. Self care ADLs are painful.     ROS      Objective:      Ortho/SPM Exam        There were no vitals filed for this visit.    The patient is not in acute distress.   Body habitus is normal.  The skin over the foot and ankle is has a healed scar.  Effusion 1+  Palpation- tender to palpate over the anterior ankle, anterior foot.  Range of motion- within normal limits  Ligament laxity exam:  Ligamentously stable  Flatfoot positive. Corrects  Pulses DP present, PT present.  Motor normal 5/5 strength in all tested muscle groups.   Sensory moderate distal sensory loss throughout the SPN    IMAGING- left foot and ankle complete taken in clinic today.  There is no evidence of acute fracture or osseous destructive process. Mild degenerative changes present at the 1st metatarsal-phalangeal joint.     There is deformity of the distal tibia and fibula related to remote healed fractures.  The ankle mortise is intact.  There is no evidence of acute fracture.  Degenerative changes are present at the tibiotalar joint.     These images were independently reviewed and discussed with the patient.      Assessment:       Encounter Diagnoses   Name Primary?    Numbness of left foot     H/O fracture of ankle              Superficial peroneal nerve numbness, left foot  Ankle arthritis, left      Plan:   Reviewed physical and radiographic findings with patient today.  Explain the numbness she is feeling is most likely caused from the superficial peroneal nerve distribution and/or ankle arthritis. Cortisone steroid injection was given today in clinic.  Inform patient to follow up in clinic in 3 months for further evaluation.  Patient expressed understanding of this plan and all questions were answered.    Jered Boyce MD  [facility]  Department of Orthopedic Surgery    This note is dictated using the M*Modal Fluency Direct word recognition program. There are word recognition mistakes that are occasionally missed on review.

## 2024-11-25 NOTE — PROCEDURES
Neuroma injection    Date/Time: 11/25/2024 8:45 AM    Performed by: Jered Boyce MD  Authorized by: Jered Boyce MD    Consent Done?:  Yes (Verbal)  Indications:  Arthritis and diagnostic evaluation  Local anesthesia used?: Yes   Anesthesia:  Local infiltration  Local anesthetic:  Lidocaine 1% without epinephrine  Left Foot Webspace: Superficial Peroneal Nerve distribution.  Needle size:  25 G  Approach:  Lateral  Medications:  40 mg triamcinolone acetonide 40 mg/mL  Patient tolerance:  Patient tolerated the procedure well with no immediate complications

## 2025-01-01 NOTE — PROCEDURES
01/01/25 1040   Provider Notification   Provider Name/Title Dr Guardado   Method of Notification Electronic Page;Phone   Request Evaluate - Remote   Notification Reason Patient Request  (medication request)     MD called per pt request for daily Prilosec 20mg and tums d/t heartburn during pregnancy. Dr Geo barragan to order medications, order given via telephone with read back.    Pharmacist called at 1045 after RN unable to order/find oral Prilosec 20mg tablet. Per pharmacist, RN to order Protonix 40mg as a medication substitute.   "Taty Kenney is a 60 y.o. female patient.    Temp: 97 °F (36.1 °C) (07/24/20 1114)  Pulse: 80 (07/24/20 1114)  Resp: 16 (07/24/20 1118)  BP: 107/76 (07/24/20 1114)  SpO2: 97 % (07/24/20 1034)  Weight: 88.6 kg (195 lb 5.2 oz) (07/24/20 1034)  Height: 5' 6" (167.6 cm) (07/24/20 0643)    PICC  Date/Time: 7/24/2020 12:48 PM  Performed by: Jaun Benson RN  Consent Done: Yes  Time out: Immediately prior to procedure a time out was called to verify the correct patient, procedure, equipment, support staff and site/side marked as required  Indications: med administration and vascular access  Anesthesia: local infiltration  Local anesthetic: lidocaine 1% without epinephrine  Anesthetic Total (mL): 3  Preparation: skin prepped with ChloraPrep  Skin prep agent dried: skin prep agent completely dried prior to procedure  Sterile barriers: all five maximum sterile barriers used - cap, mask, sterile gown, sterile gloves, and large sterile sheet  Hand hygiene: hand hygiene performed prior to central venous catheter insertion  Location details: right basilic  Catheter type: double lumen  Catheter size: 5 Fr  Catheter Length: 39cm    Ultrasound guidance: yes  Vessel Caliber: medium, compressibility normal  Needle advanced into vessel with real time Ultrasound guidance.  Guidewire confirmed in vessel.  Sterile sheath used.  Number of attempts: 1  Post-procedure: blood return through all ports, chlorhexidine patch and sterile dressing applied            Jaun Benson  7/24/2020    "

## 2025-06-25 ENCOUNTER — PATIENT MESSAGE (OUTPATIENT)
Dept: PREADMISSION TESTING | Facility: OTHER | Age: 65
End: 2025-06-25
Payer: MEDICARE

## 2025-07-01 ENCOUNTER — HOSPITAL ENCOUNTER (OUTPATIENT)
Dept: PREADMISSION TESTING | Facility: OTHER | Age: 65
Discharge: HOME | End: 2025-07-01
Payer: MEDICARE

## 2025-07-01 VITALS
RESPIRATION RATE: 16 BRPM | TEMPERATURE: 98 F | HEART RATE: 70 BPM | SYSTOLIC BLOOD PRESSURE: 143 MMHG | HEIGHT: 67 IN | DIASTOLIC BLOOD PRESSURE: 79 MMHG | BODY MASS INDEX: 31.39 KG/M2 | OXYGEN SATURATION: 96 % | WEIGHT: 200 LBS

## 2025-07-01 DIAGNOSIS — M17.12 UNILATERAL PRIMARY OSTEOARTHRITIS, LEFT KNEE: ICD-10-CM

## 2025-07-01 DIAGNOSIS — Z01.818 PREOP TESTING: Primary | ICD-10-CM

## 2025-07-01 LAB
ABSOLUTE EOSINOPHIL (OHS): 0.01 K/UL
ABSOLUTE MONOCYTE (OHS): 0.51 K/UL (ref 0.3–1)
ABSOLUTE NEUTROPHIL COUNT (OHS): 5.08 K/UL (ref 1.8–7.7)
ALBUMIN SERPL BCP-MCNC: 4.1 G/DL (ref 3.5–5.2)
ALP SERPL-CCNC: 76 UNIT/L (ref 40–150)
ALT SERPL W/O P-5'-P-CCNC: 20 UNIT/L (ref 10–44)
ANION GAP (OHS): 13 MMOL/L (ref 8–16)
AST SERPL-CCNC: 20 UNIT/L (ref 11–45)
BASOPHILS # BLD AUTO: 0.04 K/UL
BASOPHILS NFR BLD AUTO: 0.5 %
BILIRUB SERPL-MCNC: 0.3 MG/DL (ref 0.1–1)
BILIRUB UR QL STRIP.AUTO: NEGATIVE
BUN SERPL-MCNC: 15 MG/DL (ref 8–23)
CALCIUM SERPL-MCNC: 10.2 MG/DL (ref 8.7–10.5)
CHLORIDE SERPL-SCNC: 104 MMOL/L (ref 95–110)
CLARITY UR: CLEAR
CO2 SERPL-SCNC: 22 MMOL/L (ref 23–29)
COLOR UR AUTO: YELLOW
CREAT SERPL-MCNC: 1.1 MG/DL (ref 0.5–1.4)
ERYTHROCYTE [DISTWIDTH] IN BLOOD BY AUTOMATED COUNT: 17.1 % (ref 11.5–14.5)
GFR SERPLBLD CREATININE-BSD FMLA CKD-EPI: 56 ML/MIN/1.73/M2
GLUCOSE SERPL-MCNC: 94 MG/DL (ref 70–110)
GLUCOSE UR QL STRIP: NEGATIVE
HCT VFR BLD AUTO: 39.2 % (ref 37–48.5)
HGB BLD-MCNC: 12.6 GM/DL (ref 12–16)
HGB UR QL STRIP: NEGATIVE
IMM GRANULOCYTES # BLD AUTO: 0.04 K/UL (ref 0–0.04)
IMM GRANULOCYTES NFR BLD AUTO: 0.5 % (ref 0–0.5)
KETONES UR QL STRIP: NEGATIVE
LEUKOCYTE ESTERASE UR QL STRIP: NEGATIVE
LYMPHOCYTES # BLD AUTO: 1.65 K/UL (ref 1–4.8)
MCH RBC QN AUTO: 29 PG (ref 27–31)
MCHC RBC AUTO-ENTMCNC: 32.1 G/DL (ref 32–36)
MCV RBC AUTO: 90 FL (ref 82–98)
NITRITE UR QL STRIP: NEGATIVE
NUCLEATED RBC (/100WBC) (OHS): 0 /100 WBC
OHS QRS DURATION: 76 MS
OHS QTC CALCULATION: 433 MS
PH UR STRIP: 6 [PH]
PLATELET # BLD AUTO: 337 K/UL (ref 150–450)
PMV BLD AUTO: 10.9 FL (ref 9.2–12.9)
POTASSIUM SERPL-SCNC: 5.1 MMOL/L (ref 3.5–5.1)
PROT SERPL-MCNC: 8 GM/DL (ref 6–8.4)
PROT UR QL STRIP: NEGATIVE
RBC # BLD AUTO: 4.35 M/UL (ref 4–5.4)
RELATIVE EOSINOPHIL (OHS): 0.1 %
RELATIVE LYMPHOCYTE (OHS): 22.5 % (ref 18–48)
RELATIVE MONOCYTE (OHS): 7 % (ref 4–15)
RELATIVE NEUTROPHIL (OHS): 69.4 % (ref 38–73)
SODIUM SERPL-SCNC: 139 MMOL/L (ref 136–145)
SP GR UR STRIP: 1.02
UROBILINOGEN UR STRIP-ACNC: NEGATIVE EU/DL
WBC # BLD AUTO: 7.33 K/UL (ref 3.9–12.7)

## 2025-07-01 PROCEDURE — 81003 URINALYSIS AUTO W/O SCOPE: CPT | Performed by: ORTHOPAEDIC SURGERY

## 2025-07-01 PROCEDURE — 85025 COMPLETE CBC W/AUTO DIFF WBC: CPT | Performed by: ORTHOPAEDIC SURGERY

## 2025-07-01 PROCEDURE — 80053 COMPREHEN METABOLIC PANEL: CPT | Performed by: ORTHOPAEDIC SURGERY

## 2025-07-01 PROCEDURE — 93010 ELECTROCARDIOGRAM REPORT: CPT | Mod: ,,, | Performed by: INTERNAL MEDICINE

## 2025-07-01 PROCEDURE — 93005 ELECTROCARDIOGRAM TRACING: CPT

## 2025-07-01 RX ORDER — SODIUM CHLORIDE, SODIUM LACTATE, POTASSIUM CHLORIDE, CALCIUM CHLORIDE 600; 310; 30; 20 MG/100ML; MG/100ML; MG/100ML; MG/100ML
INJECTION, SOLUTION INTRAVENOUS CONTINUOUS
OUTPATIENT
Start: 2025-07-01

## 2025-07-01 RX ORDER — HYDROGEN PEROXIDE 3 %
20 SOLUTION, NON-ORAL MISCELLANEOUS
COMMUNITY

## 2025-07-01 RX ORDER — ACAMPROSATE CALCIUM 333 MG/1
666 TABLET, DELAYED RELEASE ORAL 3 TIMES DAILY
COMMUNITY

## 2025-07-01 RX ORDER — LIDOCAINE HYDROCHLORIDE 10 MG/ML
0.5 INJECTION, SOLUTION EPIDURAL; INFILTRATION; INTRACAUDAL; PERINEURAL ONCE
OUTPATIENT
Start: 2025-07-01 | End: 2025-07-01

## 2025-07-01 RX ORDER — ACETAMINOPHEN 500 MG
1000 TABLET ORAL
Status: CANCELLED | OUTPATIENT
Start: 2025-07-01 | End: 2025-07-01

## 2025-07-01 NOTE — DISCHARGE INSTRUCTIONS
Information to Prepare you for your Surgery    PRE-ADMIT TESTING   [address]  [place] [facility]  [address]     Your surgery has been scheduled at [facility]. We are pleased to have the opportunity to serve you. For Further Information please call [phone].    On the day of surgery please report to Registration on the 1st floor of the [facility].    CONTACT YOUR PHYSICIAN'S OFFICE THE DAY PRIOR TO YOUR SURGERY TO OBTAIN YOUR ARRIVAL TIME.     The evening before surgery do not eat anything after 9 p.m. ( this includes hard candy, chewing gum and mints).  You may only have GATORADE, POWERADE AND WATER  from 9 p.m. until you leave your home.   DO NOT DRINK ANY LIQUIDS ON THE WAY TO THE HOSPITAL.      Why does your anesthesiologist allow you to drink Gatorade/Powerade before surgery?  Gatorade/Powerade helps to increase your comfort before surgery and to decrease your nausea after surgery.   The carbohydrates in Gatorade/Powerade help reduce your body's stress response to surgery.  If you are a diabetic-drink only water prior to surgery.    Outpatient Surgery- May allow 2 adults (18 and older)/ Support Persons (1 being the designated ) for all surgical/procedural patients. A breastfeeding mother will be allowed her infant and 2 adult Support Persons. No one under the age of 18 will be allowed in the building.    MEDICATION INSTRUCTIONS: TAKE medications checked off by the Anesthesiologist on your Medication List.    Surgery Patients:  If you take ASPIRIN - Your PHYSICIAN/SURGEON will need to inform you IF/OR when you need to stop taking aspirin prior to your surgery.     Starting the week prior to surgery, do not take any medications containing IBUPROFEN or NSAIDS (Advil, Aleve, BC, Celebrex, Goody's, Ketorolac, Meloxicam, Mobic, Motrin, Naproxen, Toradol, etc).  If you are not sure if you should take a medicine please call your surgeon's office.  You may take Tylenol.    Do  Not Wear any make-up (especially eye make-up) to surgery. Please remove any false eyelashes or eyelash extensions. If you arrive the day of surgery with makeup/eyelashes on you will be required to remove prior to surgery. (There is a risk of corneal abrasions if eye makeup/eyelash extensions are not removed)    Leave all valuables at home.   Do Not wear any jewelry or watches, including any metal in body piercings. Jewelry must be removed prior to coming to the hospital.  There is a possibility that rings that are unable to be removed may be cut off if they are on the surgical extremity.    Please remove all hair extensions, wigs, clips and any other metal accessories/ ornaments from your hair.  These items may pose a flammable/fire risk in Surgery and must be removed.    Do not shave your surgical area at least 5 days prior to your surgery. The surgical prep will be performed at the hospital according to Infection Control regulations.    Contact Lens must be removed before surgery. Either do not wear the contact lens or bring a case and solution for storage.  Please bring a container for eyeglasses or dentures as required.  Bring any paperwork your physician has provided, such as consent forms,  history and physicals, doctor's orders, etc.   Bring comfortable clothes that are loose fitting to wear upon discharge. Take into consideration the type of surgery being performed.  Maintain your diet as advised per your physician the day prior to surgery.    Adequate rest the night before surgery is advised.   Park in the Parking lot behind the hospital or in the [facility] across the street from the parking lot. Parking is complimentary.  If you will be discharged the same day as your procedure, please arrange for a responsible adult to drive you home or to accompany you if traveling by taxi.   YOU WILL NOT BE PERMITTED TO DRIVE OR TO LEAVE THE HOSPITAL ALONE AFTER SURGERY.   If you are being discharged the  same day, it is strongly recommended that you arrange for someone to remain with you for the first 24 hrs following your surgery.    The Surgeon will speak to your family/visitor after your surgery regarding the outcome of your surgery and post op care.  The Surgeon may speak to you after your surgery, but there is a possibility you may not remember the details.  Please check with your family members regarding the conversation with the Surgeon.    We strongly recommend whoever is bringing you home be present for discharge instructions.  This will ensure a thorough understanding for your post op home care.              Bathing Instructions with Hibiclens  Shower the evening before and morning of your procedure with Chlorhexidine (Hibiclens)    Do not use Chlorhexidine on your face or genitals. Do not get in your eyes.  Wash your face with water and your regular face wash/soap  Use your regular shampoo  Apply Chlorhexidine (Hibiclens) directly on your skin or on a wet washcloth and wash gently. When showering: Move away from the shower stream when applying Chlorhexidine (Hibiclens) to avoid rinsing off too soon.  Rinse thoroughly with warm water  Do not dilute Chlorhexidine (Hibiclens)   Dry off as usual, do not use any deodorant, powder, body lotions, perfume, after shave or cologne.     If the patient has fever, cough, or signs/symptoms of Flu or Covid please do not come in for your surgery.   Contact your surgeon and your primary care physician for further instructions.   Please also call [facility] Outpatient Surgery [phone]. The unit opens at 5 AM.    If applicable, please bring your blood pressure & diabetes medications the day of surgery.

## 2025-07-02 LAB — HOLD SPECIMEN: NORMAL

## 2025-08-20 ENCOUNTER — ANESTHESIA EVENT (OUTPATIENT)
Dept: SURGERY | Facility: OTHER | Age: 65
End: 2025-08-20
Payer: MEDICARE

## 2025-08-21 ENCOUNTER — HOSPITAL ENCOUNTER (OUTPATIENT)
Dept: PREADMISSION TESTING | Facility: OTHER | Age: 65
Discharge: HOME | End: 2025-08-21
Attending: ORTHOPAEDIC SURGERY
Payer: MEDICARE

## 2025-08-21 VITALS
DIASTOLIC BLOOD PRESSURE: 68 MMHG | HEART RATE: 79 BPM | HEIGHT: 67 IN | RESPIRATION RATE: 17 BRPM | OXYGEN SATURATION: 98 % | SYSTOLIC BLOOD PRESSURE: 115 MMHG | BODY MASS INDEX: 31.08 KG/M2 | TEMPERATURE: 98 F | WEIGHT: 198 LBS

## 2025-08-21 DIAGNOSIS — Z01.818 PREOP TESTING: Primary | ICD-10-CM

## 2025-08-21 LAB
ABSOLUTE EOSINOPHIL (OHS): 0.06 K/UL
ABSOLUTE MONOCYTE (OHS): 0.4 K/UL (ref 0.3–1)
ABSOLUTE NEUTROPHIL COUNT (OHS): 3.19 K/UL (ref 1.8–7.7)
ALBUMIN SERPL BCP-MCNC: 4.1 G/DL (ref 3.5–5.2)
ALP SERPL-CCNC: 93 UNIT/L (ref 40–150)
ALT SERPL W/O P-5'-P-CCNC: 18 UNIT/L (ref 10–44)
ANION GAP (OHS): 7 MMOL/L (ref 8–16)
AST SERPL-CCNC: 22 UNIT/L (ref 11–45)
BASOPHILS # BLD AUTO: 0.03 K/UL
BASOPHILS NFR BLD AUTO: 0.6 %
BILIRUB SERPL-MCNC: 0.4 MG/DL (ref 0.1–1)
BILIRUB UR QL STRIP.AUTO: NEGATIVE
BUN SERPL-MCNC: 28 MG/DL (ref 8–23)
CALCIUM SERPL-MCNC: 9.6 MG/DL (ref 8.7–10.5)
CHLORIDE SERPL-SCNC: 103 MMOL/L (ref 95–110)
CLARITY UR: CLEAR
CO2 SERPL-SCNC: 26 MMOL/L (ref 23–29)
COLOR UR AUTO: YELLOW
CREAT SERPL-MCNC: 1 MG/DL (ref 0.5–1.4)
ERYTHROCYTE [DISTWIDTH] IN BLOOD BY AUTOMATED COUNT: 15.4 % (ref 11.5–14.5)
GFR SERPLBLD CREATININE-BSD FMLA CKD-EPI: >60 ML/MIN/1.73/M2
GLUCOSE SERPL-MCNC: 92 MG/DL (ref 70–110)
GLUCOSE UR QL STRIP: NEGATIVE
HCT VFR BLD AUTO: 37.6 % (ref 37–48.5)
HGB BLD-MCNC: 12.5 GM/DL (ref 12–16)
HGB UR QL STRIP: NEGATIVE
IMM GRANULOCYTES # BLD AUTO: 0.01 K/UL (ref 0–0.04)
IMM GRANULOCYTES NFR BLD AUTO: 0.2 % (ref 0–0.5)
KETONES UR QL STRIP: NEGATIVE
LEUKOCYTE ESTERASE UR QL STRIP: ABNORMAL
LYMPHOCYTES # BLD AUTO: 1.63 K/UL (ref 1–4.8)
MCH RBC QN AUTO: 29.6 PG (ref 27–31)
MCHC RBC AUTO-ENTMCNC: 33.2 G/DL (ref 32–36)
MCV RBC AUTO: 89 FL (ref 82–98)
MICROSCOPIC COMMENT: NORMAL
NITRITE UR QL STRIP: NEGATIVE
NUCLEATED RBC (/100WBC) (OHS): 0 /100 WBC
PH UR STRIP: 6 [PH]
PLATELET # BLD AUTO: 215 K/UL (ref 150–450)
PMV BLD AUTO: 11.3 FL (ref 9.2–12.9)
POTASSIUM SERPL-SCNC: 5 MMOL/L (ref 3.5–5.1)
PROT SERPL-MCNC: 7.5 GM/DL (ref 6–8.4)
PROT UR QL STRIP: NEGATIVE
RBC # BLD AUTO: 4.22 M/UL (ref 4–5.4)
RBC #/AREA URNS AUTO: 1 /HPF (ref 0–4)
RELATIVE EOSINOPHIL (OHS): 1.1 %
RELATIVE LYMPHOCYTE (OHS): 30.6 % (ref 18–48)
RELATIVE MONOCYTE (OHS): 7.5 % (ref 4–15)
RELATIVE NEUTROPHIL (OHS): 60 % (ref 38–73)
SODIUM SERPL-SCNC: 136 MMOL/L (ref 136–145)
SP GR UR STRIP: 1.02
SQUAMOUS #/AREA URNS AUTO: 5 /HPF
UROBILINOGEN UR STRIP-ACNC: NEGATIVE EU/DL
WBC # BLD AUTO: 5.32 K/UL (ref 3.9–12.7)
WBC #/AREA URNS AUTO: 1 /HPF (ref 0–5)

## 2025-08-21 PROCEDURE — 81001 URINALYSIS AUTO W/SCOPE: CPT | Performed by: ORTHOPAEDIC SURGERY

## 2025-08-21 PROCEDURE — 85025 COMPLETE CBC W/AUTO DIFF WBC: CPT | Performed by: ORTHOPAEDIC SURGERY

## 2025-08-21 PROCEDURE — 84295 ASSAY OF SERUM SODIUM: CPT | Performed by: ORTHOPAEDIC SURGERY

## 2025-08-21 RX ORDER — ACETAMINOPHEN 500 MG
1000 TABLET ORAL
OUTPATIENT
Start: 2025-08-21 | End: 2025-08-21

## 2025-08-21 RX ORDER — MAGNESIUM 200 MG
TABLET ORAL DAILY
COMMUNITY

## 2025-08-21 RX ORDER — SODIUM CHLORIDE, SODIUM LACTATE, POTASSIUM CHLORIDE, CALCIUM CHLORIDE 600; 310; 30; 20 MG/100ML; MG/100ML; MG/100ML; MG/100ML
INJECTION, SOLUTION INTRAVENOUS CONTINUOUS
OUTPATIENT
Start: 2025-08-21

## 2025-08-21 RX ORDER — LIDOCAINE HYDROCHLORIDE 10 MG/ML
0.5 INJECTION, SOLUTION EPIDURAL; INFILTRATION; INTRACAUDAL; PERINEURAL ONCE
OUTPATIENT
Start: 2025-08-21 | End: 2025-08-21

## 2025-08-22 LAB — HOLD SPECIMEN: NORMAL

## 2025-08-25 ENCOUNTER — ANESTHESIA (OUTPATIENT)
Dept: SURGERY | Facility: OTHER | Age: 65
End: 2025-08-25
Payer: MEDICARE

## 2025-08-25 ENCOUNTER — HOSPITAL ENCOUNTER (OUTPATIENT)
Facility: OTHER | Age: 65
Discharge: HOME HEALTH | End: 2025-08-26
Attending: ORTHOPAEDIC SURGERY | Admitting: ORTHOPAEDIC SURGERY
Payer: MEDICARE

## 2025-08-25 DIAGNOSIS — M17.12 UNILATERAL PRIMARY OSTEOARTHRITIS, LEFT KNEE: Primary | ICD-10-CM

## 2025-08-25 PROCEDURE — 25000003 PHARM REV CODE 250: Performed by: ANESTHESIOLOGY

## 2025-08-25 PROCEDURE — 63600175 PHARM REV CODE 636 W HCPCS: Performed by: NURSE ANESTHETIST, CERTIFIED REGISTERED

## 2025-08-25 PROCEDURE — 71000039 HC RECOVERY, EACH ADD'L HOUR: Performed by: ORTHOPAEDIC SURGERY

## 2025-08-25 PROCEDURE — 94760 N-INVAS EAR/PLS OXIMETRY 1: CPT

## 2025-08-25 PROCEDURE — 25000003 PHARM REV CODE 250: Performed by: ORTHOPAEDIC SURGERY

## 2025-08-25 PROCEDURE — C1713 ANCHOR/SCREW BN/BN,TIS/BN: HCPCS | Performed by: ORTHOPAEDIC SURGERY

## 2025-08-25 PROCEDURE — 37000008 HC ANESTHESIA 1ST 15 MINUTES: Performed by: ORTHOPAEDIC SURGERY

## 2025-08-25 PROCEDURE — 97161 PT EVAL LOW COMPLEX 20 MIN: CPT

## 2025-08-25 PROCEDURE — 63600175 PHARM REV CODE 636 W HCPCS: Performed by: ANESTHESIOLOGY

## 2025-08-25 PROCEDURE — C1776 JOINT DEVICE (IMPLANTABLE): HCPCS | Performed by: ORTHOPAEDIC SURGERY

## 2025-08-25 PROCEDURE — 99900035 HC TECH TIME PER 15 MIN (STAT)

## 2025-08-25 PROCEDURE — 36000710: Performed by: ORTHOPAEDIC SURGERY

## 2025-08-25 PROCEDURE — 97530 THERAPEUTIC ACTIVITIES: CPT

## 2025-08-25 PROCEDURE — 94799 UNLISTED PULMONARY SVC/PX: CPT

## 2025-08-25 PROCEDURE — 63600175 PHARM REV CODE 636 W HCPCS: Performed by: ORTHOPAEDIC SURGERY

## 2025-08-25 PROCEDURE — 36000711: Performed by: ORTHOPAEDIC SURGERY

## 2025-08-25 PROCEDURE — 63600175 PHARM REV CODE 636 W HCPCS

## 2025-08-25 PROCEDURE — 27201423 OPTIME MED/SURG SUP & DEVICES STERILE SUPPLY: Performed by: ORTHOPAEDIC SURGERY

## 2025-08-25 PROCEDURE — 25000003 PHARM REV CODE 250: Performed by: NURSE PRACTITIONER

## 2025-08-25 PROCEDURE — 27000221 HC OXYGEN, UP TO 24 HOURS

## 2025-08-25 PROCEDURE — 63600175 PHARM REV CODE 636 W HCPCS: Mod: JZ,TB | Performed by: ORTHOPAEDIC SURGERY

## 2025-08-25 PROCEDURE — 25000003 PHARM REV CODE 250: Performed by: NURSE ANESTHETIST, CERTIFIED REGISTERED

## 2025-08-25 PROCEDURE — C1729 CATH, DRAINAGE: HCPCS | Performed by: ORTHOPAEDIC SURGERY

## 2025-08-25 PROCEDURE — 37000009 HC ANESTHESIA EA ADD 15 MINS: Performed by: ORTHOPAEDIC SURGERY

## 2025-08-25 PROCEDURE — 25000003 PHARM REV CODE 250

## 2025-08-25 PROCEDURE — 71000033 HC RECOVERY, INTIAL HOUR: Performed by: ORTHOPAEDIC SURGERY

## 2025-08-25 DEVICE — PSN TIB STM 5 DEG SZ D L: Type: IMPLANTABLE DEVICE | Site: KNEE | Status: FUNCTIONAL

## 2025-08-25 DEVICE — PSN ALL POLY PAT 32MM: Type: IMPLANTABLE DEVICE | Site: KNEE | Status: FUNCTIONAL

## 2025-08-25 DEVICE — IMPLANTABLE DEVICE: Type: IMPLANTABLE DEVICE | Site: KNEE | Status: FUNCTIONAL

## 2025-08-25 DEVICE — CEMENT REFOBACIN BCR 1X40: Type: IMPLANTABLE DEVICE | Site: KNEE | Status: FUNCTIONAL

## 2025-08-25 DEVICE — COMP FEM CRUC COCR SZ 6 LEFT: Type: IMPLANTABLE DEVICE | Site: KNEE | Status: FUNCTIONAL

## 2025-08-25 RX ORDER — BUPIVACAINE HCL/EPINEPHRINE 0.25-.0005
VIAL (ML) INJECTION
Status: DISCONTINUED | OUTPATIENT
Start: 2025-08-25 | End: 2025-08-25 | Stop reason: HOSPADM

## 2025-08-25 RX ORDER — OXYCODONE HYDROCHLORIDE 5 MG/1
5 TABLET ORAL
Status: DISCONTINUED | OUTPATIENT
Start: 2025-08-25 | End: 2025-08-25 | Stop reason: HOSPADM

## 2025-08-25 RX ORDER — ROCURONIUM BROMIDE 10 MG/ML
INJECTION, SOLUTION INTRAVENOUS
Status: DISCONTINUED | OUTPATIENT
Start: 2025-08-25 | End: 2025-08-25

## 2025-08-25 RX ORDER — OXYCODONE HYDROCHLORIDE 10 MG/1
10 TABLET ORAL EVERY 4 HOURS PRN
Status: DISCONTINUED | OUTPATIENT
Start: 2025-08-25 | End: 2025-08-26 | Stop reason: HOSPADM

## 2025-08-25 RX ORDER — PROCHLORPERAZINE EDISYLATE 5 MG/ML
5 INJECTION INTRAMUSCULAR; INTRAVENOUS EVERY 30 MIN PRN
Status: DISCONTINUED | OUTPATIENT
Start: 2025-08-25 | End: 2025-08-25 | Stop reason: HOSPADM

## 2025-08-25 RX ORDER — DOCUSATE SODIUM 100 MG/1
100 CAPSULE, LIQUID FILLED ORAL EVERY 12 HOURS
Status: DISCONTINUED | OUTPATIENT
Start: 2025-08-25 | End: 2025-08-26 | Stop reason: HOSPADM

## 2025-08-25 RX ORDER — ACETAMINOPHEN 500 MG
1000 TABLET ORAL
Status: COMPLETED | OUTPATIENT
Start: 2025-08-25 | End: 2025-08-25

## 2025-08-25 RX ORDER — KETOROLAC TROMETHAMINE 30 MG/ML
INJECTION, SOLUTION INTRAMUSCULAR; INTRAVENOUS
Status: DISCONTINUED | OUTPATIENT
Start: 2025-08-25 | End: 2025-08-25 | Stop reason: HOSPADM

## 2025-08-25 RX ORDER — GABAPENTIN 300 MG/1
300 CAPSULE ORAL 3 TIMES DAILY
Status: DISCONTINUED | OUTPATIENT
Start: 2025-08-25 | End: 2025-08-26 | Stop reason: HOSPADM

## 2025-08-25 RX ORDER — HYDROMORPHONE HYDROCHLORIDE 1 MG/ML
0.5 INJECTION, SOLUTION INTRAMUSCULAR; INTRAVENOUS; SUBCUTANEOUS EVERY 4 HOURS PRN
Status: DISCONTINUED | OUTPATIENT
Start: 2025-08-25 | End: 2025-08-26 | Stop reason: HOSPADM

## 2025-08-25 RX ORDER — CEFAZOLIN 2 G/1
2 INJECTION, POWDER, FOR SOLUTION INTRAMUSCULAR; INTRAVENOUS
Status: COMPLETED | OUTPATIENT
Start: 2025-08-25 | End: 2025-08-26

## 2025-08-25 RX ORDER — SODIUM CHLORIDE 0.9 % (FLUSH) 0.9 %
3 SYRINGE (ML) INJECTION
Status: DISCONTINUED | OUTPATIENT
Start: 2025-08-25 | End: 2025-08-25 | Stop reason: HOSPADM

## 2025-08-25 RX ORDER — LORAZEPAM 0.5 MG/1
0.5 TABLET ORAL EVERY 12 HOURS PRN
Status: DISCONTINUED | OUTPATIENT
Start: 2025-08-25 | End: 2025-08-26 | Stop reason: HOSPADM

## 2025-08-25 RX ORDER — CYCLOBENZAPRINE HCL 5 MG
10 TABLET ORAL ONCE
Status: COMPLETED | OUTPATIENT
Start: 2025-08-25 | End: 2025-08-25

## 2025-08-25 RX ORDER — ONDANSETRON HYDROCHLORIDE 2 MG/ML
INJECTION, SOLUTION INTRAVENOUS
Status: DISCONTINUED | OUTPATIENT
Start: 2025-08-25 | End: 2025-08-25

## 2025-08-25 RX ORDER — CEFAZOLIN 2 G/1
2 INJECTION, POWDER, FOR SOLUTION INTRAMUSCULAR; INTRAVENOUS
Status: COMPLETED | OUTPATIENT
Start: 2025-08-25 | End: 2025-08-25

## 2025-08-25 RX ORDER — TRANEXAMIC ACID 1 G/10ML
INJECTION, SOLUTION INTRAVENOUS
Status: DISCONTINUED | OUTPATIENT
Start: 2025-08-25 | End: 2025-08-25

## 2025-08-25 RX ORDER — DIPHENHYDRAMINE HYDROCHLORIDE 50 MG/ML
25 INJECTION, SOLUTION INTRAMUSCULAR; INTRAVENOUS EVERY 6 HOURS PRN
Status: DISCONTINUED | OUTPATIENT
Start: 2025-08-25 | End: 2025-08-26 | Stop reason: HOSPADM

## 2025-08-25 RX ORDER — OXYCODONE HYDROCHLORIDE 5 MG/1
5 TABLET ORAL EVERY 4 HOURS PRN
Status: DISCONTINUED | OUTPATIENT
Start: 2025-08-25 | End: 2025-08-26 | Stop reason: HOSPADM

## 2025-08-25 RX ORDER — CELECOXIB 200 MG/1
200 CAPSULE ORAL
Status: DISCONTINUED | OUTPATIENT
Start: 2025-08-25 | End: 2025-08-26 | Stop reason: HOSPADM

## 2025-08-25 RX ORDER — MUPIROCIN 20 MG/G
OINTMENT TOPICAL 2 TIMES DAILY
Status: DISCONTINUED | OUTPATIENT
Start: 2025-08-25 | End: 2025-08-26 | Stop reason: HOSPADM

## 2025-08-25 RX ORDER — AMLODIPINE BESYLATE 5 MG/1
5 TABLET ORAL DAILY
Status: DISCONTINUED | OUTPATIENT
Start: 2025-08-26 | End: 2025-08-26 | Stop reason: HOSPADM

## 2025-08-25 RX ORDER — SODIUM CHLORIDE, SODIUM LACTATE, POTASSIUM CHLORIDE, CALCIUM CHLORIDE 600; 310; 30; 20 MG/100ML; MG/100ML; MG/100ML; MG/100ML
INJECTION, SOLUTION INTRAVENOUS CONTINUOUS
Status: DISCONTINUED | OUTPATIENT
Start: 2025-08-25 | End: 2025-08-25

## 2025-08-25 RX ORDER — NAPROXEN SODIUM 220 MG/1
81 TABLET, FILM COATED ORAL 2 TIMES DAILY
Status: DISCONTINUED | OUTPATIENT
Start: 2025-08-26 | End: 2025-08-26 | Stop reason: HOSPADM

## 2025-08-25 RX ORDER — LIDOCAINE HYDROCHLORIDE 10 MG/ML
0.5 INJECTION, SOLUTION EPIDURAL; INFILTRATION; INTRACAUDAL; PERINEURAL ONCE
Status: DISCONTINUED | OUTPATIENT
Start: 2025-08-25 | End: 2025-08-25 | Stop reason: HOSPADM

## 2025-08-25 RX ORDER — METOCLOPRAMIDE HYDROCHLORIDE 5 MG/ML
5 INJECTION INTRAMUSCULAR; INTRAVENOUS EVERY 6 HOURS PRN
Status: DISCONTINUED | OUTPATIENT
Start: 2025-08-25 | End: 2025-08-26 | Stop reason: HOSPADM

## 2025-08-25 RX ORDER — CHLORDIAZEPOXIDE HYDROCHLORIDE 25 MG/1
25 CAPSULE, GELATIN COATED ORAL NIGHTLY
Status: DISCONTINUED | OUTPATIENT
Start: 2025-08-25 | End: 2025-08-26 | Stop reason: HOSPADM

## 2025-08-25 RX ORDER — PROPOFOL 10 MG/ML
VIAL (ML) INTRAVENOUS
Status: DISCONTINUED | OUTPATIENT
Start: 2025-08-25 | End: 2025-08-25

## 2025-08-25 RX ORDER — GLUCAGON 1 MG
1 KIT INJECTION
Status: DISCONTINUED | OUTPATIENT
Start: 2025-08-25 | End: 2025-08-25 | Stop reason: HOSPADM

## 2025-08-25 RX ORDER — DEXAMETHASONE SODIUM PHOSPHATE 4 MG/ML
INJECTION, SOLUTION INTRA-ARTICULAR; INTRALESIONAL; INTRAMUSCULAR; INTRAVENOUS; SOFT TISSUE
Status: DISCONTINUED | OUTPATIENT
Start: 2025-08-25 | End: 2025-08-25

## 2025-08-25 RX ORDER — KETAMINE HCL IN 0.9 % NACL 50 MG/5 ML
SYRINGE (ML) INTRAVENOUS
Status: DISCONTINUED | OUTPATIENT
Start: 2025-08-25 | End: 2025-08-25

## 2025-08-25 RX ORDER — OXYCODONE HYDROCHLORIDE 5 MG/1
5 TABLET ORAL ONCE
Refills: 0 | Status: COMPLETED | OUTPATIENT
Start: 2025-08-25 | End: 2025-08-25

## 2025-08-25 RX ORDER — FAMOTIDINE 20 MG/1
20 TABLET, FILM COATED ORAL 2 TIMES DAILY
Status: DISCONTINUED | OUTPATIENT
Start: 2025-08-25 | End: 2025-08-26 | Stop reason: HOSPADM

## 2025-08-25 RX ORDER — SODIUM CHLORIDE 0.9 % (FLUSH) 0.9 %
3 SYRINGE (ML) INJECTION EVERY 6 HOURS PRN
Status: DISCONTINUED | OUTPATIENT
Start: 2025-08-25 | End: 2025-08-26 | Stop reason: HOSPADM

## 2025-08-25 RX ORDER — HYDROMORPHONE HYDROCHLORIDE 2 MG/ML
0.4 INJECTION, SOLUTION INTRAMUSCULAR; INTRAVENOUS; SUBCUTANEOUS EVERY 5 MIN PRN
Status: DISCONTINUED | OUTPATIENT
Start: 2025-08-25 | End: 2025-08-25 | Stop reason: HOSPADM

## 2025-08-25 RX ORDER — ONDANSETRON HYDROCHLORIDE 2 MG/ML
8 INJECTION, SOLUTION INTRAVENOUS EVERY 8 HOURS PRN
Status: DISCONTINUED | OUTPATIENT
Start: 2025-08-25 | End: 2025-08-26 | Stop reason: HOSPADM

## 2025-08-25 RX ORDER — DEXTROSE MONOHYDRATE AND SODIUM CHLORIDE 5; .9 G/100ML; G/100ML
INJECTION, SOLUTION INTRAVENOUS CONTINUOUS
Status: DISCONTINUED | OUTPATIENT
Start: 2025-08-25 | End: 2025-08-26 | Stop reason: HOSPADM

## 2025-08-25 RX ORDER — NALTREXONE HYDROCHLORIDE 50 MG/1
50 TABLET, FILM COATED ORAL DAILY
Status: DISCONTINUED | OUTPATIENT
Start: 2025-08-26 | End: 2025-08-26 | Stop reason: HOSPADM

## 2025-08-25 RX ORDER — PHENYLEPHRINE HYDROCHLORIDE 10 MG/ML
INJECTION INTRAVENOUS
Status: DISCONTINUED | OUTPATIENT
Start: 2025-08-25 | End: 2025-08-25

## 2025-08-25 RX ORDER — BUPIVACAINE 13.3 MG/ML
INJECTION, SUSPENSION, LIPOSOMAL INFILTRATION
Status: DISCONTINUED | OUTPATIENT
Start: 2025-08-25 | End: 2025-08-25 | Stop reason: HOSPADM

## 2025-08-25 RX ORDER — LIDOCAINE HYDROCHLORIDE 20 MG/ML
INJECTION INTRAVENOUS
Status: DISCONTINUED | OUTPATIENT
Start: 2025-08-25 | End: 2025-08-25

## 2025-08-25 RX ORDER — ACETAMINOPHEN 500 MG
1000 TABLET ORAL EVERY 8 HOURS
Status: DISCONTINUED | OUTPATIENT
Start: 2025-08-25 | End: 2025-08-26 | Stop reason: HOSPADM

## 2025-08-25 RX ORDER — FENTANYL CITRATE 50 UG/ML
INJECTION, SOLUTION INTRAMUSCULAR; INTRAVENOUS
Status: DISCONTINUED | OUTPATIENT
Start: 2025-08-25 | End: 2025-08-25

## 2025-08-25 RX ADMIN — OXYCODONE HYDROCHLORIDE 5 MG: 5 TABLET ORAL at 12:08

## 2025-08-25 RX ADMIN — SUGAMMADEX 359 MG: 100 INJECTION, SOLUTION INTRAVENOUS at 11:08

## 2025-08-25 RX ADMIN — ONDANSETRON HYDROCHLORIDE 4 MG: 2 INJECTION INTRAMUSCULAR; INTRAVENOUS at 09:08

## 2025-08-25 RX ADMIN — VANCOMYCIN HYDROCHLORIDE 1250 MG: 1.25 INJECTION, POWDER, LYOPHILIZED, FOR SOLUTION INTRAVENOUS at 09:08

## 2025-08-25 RX ADMIN — DEXTROSE AND SODIUM CHLORIDE: 5; 900 INJECTION, SOLUTION INTRAVENOUS at 05:08

## 2025-08-25 RX ADMIN — Medication 30 MG: at 10:08

## 2025-08-25 RX ADMIN — PHENYLEPHRINE HYDROCHLORIDE 200 MCG: 10 INJECTION INTRAVENOUS at 10:08

## 2025-08-25 RX ADMIN — DEXAMETHASONE SODIUM PHOSPHATE 8 MG: 4 INJECTION, SOLUTION INTRAMUSCULAR; INTRAVENOUS at 10:08

## 2025-08-25 RX ADMIN — SODIUM CHLORIDE, POTASSIUM CHLORIDE, SODIUM LACTATE AND CALCIUM CHLORIDE: 600; 310; 30; 20 INJECTION, SOLUTION INTRAVENOUS at 09:08

## 2025-08-25 RX ADMIN — TRANEXAMIC ACID 2000 MG: 100 INJECTION, SOLUTION INTRAVENOUS at 09:08

## 2025-08-25 RX ADMIN — DOCUSATE SODIUM 100 MG: 100 CAPSULE, LIQUID FILLED ORAL at 09:08

## 2025-08-25 RX ADMIN — LIDOCAINE HYDROCHLORIDE 50 MG: 20 INJECTION, SOLUTION INTRAVENOUS at 09:08

## 2025-08-25 RX ADMIN — CEFAZOLIN 2 G: 2 INJECTION, POWDER, FOR SOLUTION INTRAMUSCULAR; INTRAVENOUS at 10:08

## 2025-08-25 RX ADMIN — ACETAMINOPHEN 1000 MG: 500 TABLET, FILM COATED ORAL at 09:08

## 2025-08-25 RX ADMIN — CARBOXYMETHYLCELLULOSE SODIUM 2 DROP: 2.5 SOLUTION/ DROPS OPHTHALMIC at 09:08

## 2025-08-25 RX ADMIN — FENTANYL CITRATE 100 MCG: 50 INJECTION, SOLUTION INTRAMUSCULAR; INTRAVENOUS at 09:08

## 2025-08-25 RX ADMIN — MUPIROCIN: 20 OINTMENT TOPICAL at 09:08

## 2025-08-25 RX ADMIN — HYDROMORPHONE HYDROCHLORIDE 0.4 MG: 2 INJECTION INTRAMUSCULAR; INTRAVENOUS; SUBCUTANEOUS at 11:08

## 2025-08-25 RX ADMIN — OXYCODONE HYDROCHLORIDE 10 MG: 10 TABLET ORAL at 09:08

## 2025-08-25 RX ADMIN — CEFAZOLIN 2 G: 2 INJECTION, POWDER, FOR SOLUTION INTRAMUSCULAR; INTRAVENOUS at 05:08

## 2025-08-25 RX ADMIN — OXYCODONE HYDROCHLORIDE 5 MG: 5 TABLET ORAL at 11:08

## 2025-08-25 RX ADMIN — CELECOXIB 200 MG: 200 CAPSULE ORAL at 06:08

## 2025-08-25 RX ADMIN — GABAPENTIN 300 MG: 300 CAPSULE ORAL at 03:08

## 2025-08-25 RX ADMIN — LORAZEPAM 0.5 MG: 0.5 TABLET ORAL at 09:08

## 2025-08-25 RX ADMIN — OXYCODONE HYDROCHLORIDE 5 MG: 5 TABLET ORAL at 05:08

## 2025-08-25 RX ADMIN — PROPOFOL 10 MG: 10 INJECTION, EMULSION INTRAVENOUS at 09:08

## 2025-08-25 RX ADMIN — FAMOTIDINE 20 MG: 20 TABLET, FILM COATED ORAL at 09:08

## 2025-08-25 RX ADMIN — DEXTROSE MONOHYDRATE 1250 MG: 50 INJECTION, SOLUTION INTRAVENOUS at 09:08

## 2025-08-25 RX ADMIN — HYDROMORPHONE HYDROCHLORIDE 0.4 MG: 2 INJECTION INTRAMUSCULAR; INTRAVENOUS; SUBCUTANEOUS at 12:08

## 2025-08-25 RX ADMIN — GLYCOPYRROLATE 0.2 MG: 0.2 INJECTION, SOLUTION INTRAMUSCULAR; INTRAVITREAL at 10:08

## 2025-08-25 RX ADMIN — CHLORDIAZEPOXIDE HYDROCHLORIDE 25 MG: 25 CAPSULE ORAL at 09:08

## 2025-08-25 RX ADMIN — ROCURONIUM BROMIDE 50 MG: 10 INJECTION, SOLUTION INTRAVENOUS at 09:08

## 2025-08-25 RX ADMIN — CYCLOBENZAPRINE HYDROCHLORIDE 10 MG: 5 TABLET, FILM COATED ORAL at 11:08

## 2025-08-25 RX ADMIN — HYDROMORPHONE HYDROCHLORIDE 0.5 MG: 1 INJECTION, SOLUTION INTRAMUSCULAR; INTRAVENOUS; SUBCUTANEOUS at 11:08

## 2025-08-25 RX ADMIN — GABAPENTIN 300 MG: 300 CAPSULE ORAL at 09:08

## 2025-08-25 RX ADMIN — ACETAMINOPHEN 1000 MG: 500 TABLET, FILM COATED ORAL at 08:08

## 2025-08-26 VITALS
HEIGHT: 67 IN | DIASTOLIC BLOOD PRESSURE: 61 MMHG | HEART RATE: 85 BPM | OXYGEN SATURATION: 93 % | SYSTOLIC BLOOD PRESSURE: 131 MMHG | TEMPERATURE: 98 F | RESPIRATION RATE: 16 BRPM | BODY MASS INDEX: 33.71 KG/M2 | WEIGHT: 214.75 LBS

## 2025-08-26 LAB
ERYTHROCYTE [DISTWIDTH] IN BLOOD BY AUTOMATED COUNT: 15.8 % (ref 11.5–14.5)
HCT VFR BLD AUTO: 34.6 % (ref 37–48.5)
HGB BLD-MCNC: 10.9 GM/DL (ref 12–16)
MCH RBC QN AUTO: 28.8 PG (ref 27–31)
MCHC RBC AUTO-ENTMCNC: 31.5 G/DL (ref 32–36)
MCV RBC AUTO: 91 FL (ref 82–98)
PLATELET # BLD AUTO: 212 K/UL (ref 150–450)
PMV BLD AUTO: 11.4 FL (ref 9.2–12.9)
RBC # BLD AUTO: 3.79 M/UL (ref 4–5.4)
WBC # BLD AUTO: 10.1 K/UL (ref 3.9–12.7)

## 2025-08-26 PROCEDURE — 97116 GAIT TRAINING THERAPY: CPT

## 2025-08-26 PROCEDURE — 97165 OT EVAL LOW COMPLEX 30 MIN: CPT

## 2025-08-26 PROCEDURE — 25000003 PHARM REV CODE 250: Performed by: NURSE PRACTITIONER

## 2025-08-26 PROCEDURE — 97530 THERAPEUTIC ACTIVITIES: CPT

## 2025-08-26 PROCEDURE — 97535 SELF CARE MNGMENT TRAINING: CPT

## 2025-08-26 PROCEDURE — 85027 COMPLETE CBC AUTOMATED: CPT | Performed by: ORTHOPAEDIC SURGERY

## 2025-08-26 PROCEDURE — 63600175 PHARM REV CODE 636 W HCPCS: Performed by: ORTHOPAEDIC SURGERY

## 2025-08-26 PROCEDURE — 25000003 PHARM REV CODE 250: Performed by: ORTHOPAEDIC SURGERY

## 2025-08-26 PROCEDURE — 36415 COLL VENOUS BLD VENIPUNCTURE: CPT | Performed by: ORTHOPAEDIC SURGERY

## 2025-08-26 PROCEDURE — 97110 THERAPEUTIC EXERCISES: CPT

## 2025-08-26 RX ORDER — OXYCODONE AND ACETAMINOPHEN 5; 325 MG/1; MG/1
TABLET ORAL
Qty: 60 TABLET | Refills: 0 | Status: SHIPPED | OUTPATIENT
Start: 2025-08-26

## 2025-08-26 RX ORDER — NAPROXEN SODIUM 220 MG/1
81 TABLET, FILM COATED ORAL 2 TIMES DAILY
Qty: 60 TABLET | Refills: 0 | Status: SHIPPED | OUTPATIENT
Start: 2025-08-26

## 2025-08-26 RX ORDER — ONDANSETRON 8 MG/1
8 TABLET, ORALLY DISINTEGRATING ORAL EVERY 8 HOURS PRN
Qty: 20 TABLET | Refills: 1 | Status: SHIPPED | OUTPATIENT
Start: 2025-08-26

## 2025-08-26 RX ADMIN — CEFAZOLIN 2 G: 2 INJECTION, POWDER, FOR SOLUTION INTRAMUSCULAR; INTRAVENOUS at 02:08

## 2025-08-26 RX ADMIN — AMLODIPINE BESYLATE 5 MG: 5 TABLET ORAL at 08:08

## 2025-08-26 RX ADMIN — GABAPENTIN 300 MG: 300 CAPSULE ORAL at 08:08

## 2025-08-26 RX ADMIN — ACETAMINOPHEN 1000 MG: 500 TABLET, FILM COATED ORAL at 05:08

## 2025-08-26 RX ADMIN — OXYCODONE HYDROCHLORIDE 10 MG: 10 TABLET ORAL at 10:08

## 2025-08-26 RX ADMIN — FAMOTIDINE 20 MG: 20 TABLET, FILM COATED ORAL at 08:08

## 2025-08-26 RX ADMIN — OXYCODONE HYDROCHLORIDE 10 MG: 10 TABLET ORAL at 03:08

## 2025-08-26 RX ADMIN — ASPIRIN 81 MG CHEWABLE TABLET 81 MG: 81 TABLET CHEWABLE at 08:08

## (undated) DEVICE — PACK LOWER EXTREMITY

## (undated) DEVICE — PAD CAST SPECIALIST STRL 6

## (undated) DEVICE — BIT DRILL AO 2.6X135MM SCALED

## (undated) DEVICE — STRIP MEDI WND CLSR 1/2X4IN

## (undated) DEVICE — DRESSING XEROFORM 5X9IN

## (undated) DEVICE — SUT CTD VICRYL 2.0

## (undated) DEVICE — DRAPE C-ARMOR EQUIPMENT COVER

## (undated) DEVICE — DRAPE PLASTIC U 60X72

## (undated) DEVICE — PACK ARTHROSCOPY W/ISO BAC

## (undated) DEVICE — SUT ETHIBOND EXCEL 2 V37 30

## (undated) DEVICE — SPONGE DERMACEA GAUZE 4X4

## (undated) DEVICE — DRESSING TEGADERM 2 3/8 X 2.75

## (undated) DEVICE — PAD ABDOMINAL 5X9 STERILE

## (undated) DEVICE — DRAPE STERI U-SHAPED 47X51IN

## (undated) DEVICE — DRAPE STERI INSTRUMENT 1018

## (undated) DEVICE — DRESSING XEROFORM FOIL PK 1X8

## (undated) DEVICE — SYR 10CC LUER LOCK

## (undated) DEVICE — SUT PRLENE 1CT 1 BL MONO 30

## (undated) DEVICE — WRAP COBAN NL STRL 4INX5YD

## (undated) DEVICE — SEE MEDLINE ITEM 152523

## (undated) DEVICE — SEE MEDLINE ITEM 146345

## (undated) DEVICE — GAUZE SPONGE 4X4 12PLY

## (undated) DEVICE — INSTRUMENT SUCTION FRAZIER 12F

## (undated) DEVICE — PENCIL ELECTROSURG HOLST W/BLD

## (undated) DEVICE — MANIFOLD 4 PORT

## (undated) DEVICE — SPONGE LAP 18X18 PREWASHED

## (undated) DEVICE — SUT QUILL 2T11 36IN

## (undated) DEVICE — PACK SURGERY START

## (undated) DEVICE — SEE MEDLINE ITEM 146292

## (undated) DEVICE — APPLICATOR CHLORAPREP ORN 26ML

## (undated) DEVICE — KIT TOTAL HIP OPTIVAC

## (undated) DEVICE — BNDG COFLEX FOAM LF2 ST 6X5YD

## (undated) DEVICE — DRAPE U SPLIT SHEET 54X76IN

## (undated) DEVICE — ALCOHOL 70% ISOP W/GREEN 16OZ

## (undated) DEVICE — GLOVE BIOGEL SKINSENSE PI 7.5

## (undated) DEVICE — Device

## (undated) DEVICE — TOURNIQUET SB QC DP 34X4IN

## (undated) DEVICE — NDL HYPO REG 25G X 1 1/2

## (undated) DEVICE — DRAPE THREE-QTR REINF 53X77IN

## (undated) DEVICE — COVER OVERHEAD SURG LT BLUE

## (undated) DEVICE — CANISTER INFOV.A.C WOUND 500ML

## (undated) DEVICE — DRAPE C-ARM/MOBILE XRAY 44X80

## (undated) DEVICE — SPONGE COTTON TRAY 4X4IN

## (undated) DEVICE — SEALER BIPOLAR TISSUE 6.0

## (undated) DEVICE — ELECTRODE REM PLYHSV RETURN 9

## (undated) DEVICE — UNDERGLOVES BIOGEL PI SIZE 8

## (undated) DEVICE — SEE MEDLINE ITEM 146231

## (undated) DEVICE — SCREW BONE NON LOCK 3.5X18MM
Type: IMPLANTABLE DEVICE | Site: ANKLE | Status: NON-FUNCTIONAL
Removed: 2020-07-07

## (undated) DEVICE — BLADE SAGITTAL 18 X 1.27 X 90M

## (undated) DEVICE — SOL NACL IRR 3000ML

## (undated) DEVICE — SEE MEDLINE ITEM 156953

## (undated) DEVICE — SUT 2/0 30IN PROLENE MONO

## (undated) DEVICE — GLOVE BIOGEL SKINSENSE PI 7.0

## (undated) DEVICE — GOWN SURGICAL XX LARGE X LONG

## (undated) DEVICE — CONTAINER SPEC OR STRL 4.5OZ

## (undated) DEVICE — INJECTABLE BONE SUBSTITUTE DELIVERY CANNULA

## (undated) DEVICE — SUT VICRYL PLUS 2-0 CT1 18

## (undated) DEVICE — TOWEL OR DISP STRL BLUE 4/PK

## (undated) DEVICE — UNDERPAD ULTRASORB 300LB 30X36

## (undated) DEVICE — SEE MEDLINE ITEM 157117

## (undated) DEVICE — BLADE SURG CARBON STEEL #10

## (undated) DEVICE — BLADE SURG CARBON STEEL SZ11

## (undated) DEVICE — NDL HYPODERMIC SAF 18G 1.5IN

## (undated) DEVICE — INTERPULSE SET

## (undated) DEVICE — SEE MEDLINE ITEM 157216

## (undated) DEVICE — COVER PROXIMA MAYO STAND

## (undated) DEVICE — NDL 18GA X1 1/2 REG BEVEL

## (undated) DEVICE — DURAPREP SURG SCRUB 26ML

## (undated) DEVICE — BLADE SAG DUAL 18MMX1.27MMX90M

## (undated) DEVICE — ELECTRODE MEGADYNE RETURN DUAL

## (undated) DEVICE — TUBING 4-LEAD ARTHOSCOPY

## (undated) DEVICE — SEE MEDLINE ITEM 156955

## (undated) DEVICE — SPLINT PLASTER FAST SET 5X30IN

## (undated) DEVICE — HOOD T7 W/ PEEL AWAY LENS

## (undated) DEVICE — GLOVE BIOGEL PIMICRO INDIC 8.5

## (undated) DEVICE — GLOVE BIOGEL ORTHOPEDIC 7.5

## (undated) DEVICE — SUT ETHILON 3-0 PS2 18 BLK

## (undated) DEVICE — KIT COLLECTION E SWAB REGULAR

## (undated) DEVICE — DRESSING XEROFORM NONADH 1X8IN

## (undated) DEVICE — BANDAGE ACE ELASTIC 6"

## (undated) DEVICE — TOURNIQUET SB QC DP 24X4IN

## (undated) DEVICE — GLOVE BIOGEL ORTHOPEDIC 8

## (undated) DEVICE — STAPLER SKIN PROXIMATE WIDE

## (undated) DEVICE — TUBING SUC UNIV W/CONN 12FT

## (undated) DEVICE — SYR ONLY LUER LOCK 20CC

## (undated) DEVICE — SEE MEDLINE ITEM 152622

## (undated) DEVICE — GLOVE BIOGEL PI MICRO INDIC 8

## (undated) DEVICE — NDL 22GA X1 1/2 REG BEVEL

## (undated) DEVICE — SEE MEDLINE ITEM 157116

## (undated) DEVICE — SEE MEDLINE ITEM 152529

## (undated) DEVICE — UNDERGLOVES BIOGEL PI SIZE 8.5

## (undated) DEVICE — BLADE SAW RECIP 76MMX12.5MM

## (undated) DEVICE — PULSAVAC ZIMMER

## (undated) DEVICE — SWAB CULTURETTE SINGLE

## (undated) DEVICE — DRAPE STERI-DRAPE 1000 17X11IN

## (undated) DEVICE — GLOVE BIOGEL SZ 8 1/2

## (undated) DEVICE — BANDAGE MATRIX HK LOOP 6IN 5YD

## (undated) DEVICE — DRESSING N ADH OIL EMUL 3X8 3S

## (undated) DEVICE — STAPLER SKIN ROTATING HEAD

## (undated) DEVICE — NDL SPINAL 18GX3.5 SPINOCAN

## (undated) DEVICE — HOOD T-5 TEAR AWAY STERILE

## (undated) DEVICE — SUT PROLENE 0 CT1 30IN BLUE

## (undated) DEVICE — BANDAGE ESMARK ELASTIC ST 6X9

## (undated) DEVICE — DRESSING GAUZE XEROFORM 5X9

## (undated) DEVICE — ALCOHOL ISOPROPYL BLU 70% 16OZ

## (undated) DEVICE — BLADE RMR PATELLA 38MM W/ PILO

## (undated) DEVICE — PAD PREP 50/CA

## (undated) DEVICE — SHEET DRAPE MEDIUM

## (undated) DEVICE — BLANKET HYPER ADULT 24X60IN

## (undated) DEVICE — SUT JJ41G O VICRYL

## (undated) DEVICE — GLOVE BIOGEL PI ORTHO PRO 8.5

## (undated) DEVICE — DRAPE EXTREMITY ORTHOMAX

## (undated) DEVICE — COVER BACK TABLE 72X21

## (undated) DEVICE — SOL IRR NACL .9% 3000ML

## (undated) DEVICE — NDL 21GA X1 1/2 REG BEVEL

## (undated) DEVICE — TRAY CATH 1-LYR URIMTR 16FR

## (undated) DEVICE — DRESSING AQUACEL SACRAL 9 X 9

## (undated) DEVICE — SYR SALINE FLSH PRFL STRL 10ML

## (undated) DEVICE — KIT EVACUATOR 3-SPRING 1/8 DRN

## (undated) DEVICE — SEE MEDLINE ITEM 152530

## (undated) DEVICE — UNDERGLOVES BIOGEL PI SZ 7 LF

## (undated) DEVICE — SUT VICRYL+ 1 CTX 18IN VIOL

## (undated) DEVICE — SOL NACL .9% 250ML INJ

## (undated) DEVICE — DRESSING INFOVAC SMALL BLK

## (undated) DEVICE — PENCIL ROCKER SWITCH 10FT CORD

## (undated) DEVICE — SEE MEDLINE ITEM 157131

## (undated) DEVICE — PAD COLD THERAPY KNEE WRAP ON

## (undated) DEVICE — DRAPE INCISE IOBAN 2 23X33IN

## (undated) DEVICE — COVER HD BACK TABLE 6FT

## (undated) DEVICE — SYR 30CC LUER LOCK

## (undated) DEVICE — ADHESIVE DERMABOND ADVANCED

## (undated) DEVICE — MAT SUCTION PUDDLEVAC ORANGE